# Patient Record
Sex: FEMALE | Race: WHITE | NOT HISPANIC OR LATINO | Employment: FULL TIME | URBAN - METROPOLITAN AREA
[De-identification: names, ages, dates, MRNs, and addresses within clinical notes are randomized per-mention and may not be internally consistent; named-entity substitution may affect disease eponyms.]

---

## 2017-01-15 ENCOUNTER — OFFICE VISIT (OUTPATIENT)
Dept: URGENT CARE | Facility: CLINIC | Age: 57
End: 2017-01-15
Payer: COMMERCIAL

## 2017-01-15 PROCEDURE — 96372 THER/PROPH/DIAG INJ SC/IM: CPT

## 2017-01-15 PROCEDURE — 99213 OFFICE O/P EST LOW 20 MIN: CPT

## 2017-02-08 ENCOUNTER — HOSPITAL ENCOUNTER (OUTPATIENT)
Facility: HOSPITAL | Age: 57
Setting detail: OUTPATIENT SURGERY
Discharge: HOME | End: 2017-02-08
Attending: OBSTETRICS & GYNECOLOGY | Admitting: OBSTETRICS & GYNECOLOGY
Payer: COMMERCIAL

## 2017-02-08 ENCOUNTER — ANESTHESIA EVENT (OUTPATIENT)
Dept: PERIOP | Facility: HOSPITAL | Age: 57
End: 2017-02-08
Payer: COMMERCIAL

## 2017-02-08 ENCOUNTER — ANESTHESIA (OUTPATIENT)
Dept: PERIOP | Facility: HOSPITAL | Age: 57
End: 2017-02-08
Payer: COMMERCIAL

## 2017-02-08 VITALS
SYSTOLIC BLOOD PRESSURE: 117 MMHG | HEIGHT: 62 IN | DIASTOLIC BLOOD PRESSURE: 67 MMHG | TEMPERATURE: 97.5 F | OXYGEN SATURATION: 94 % | HEART RATE: 73 BPM | RESPIRATION RATE: 16 BRPM | BODY MASS INDEX: 30.91 KG/M2 | WEIGHT: 168 LBS

## 2017-02-08 DIAGNOSIS — T19.2XXA: ICD-10-CM

## 2017-02-08 LAB
BASOPHILS # BLD AUTO: 0.02 THOUSANDS/ΜL (ref 0–0.1)
BASOPHILS NFR BLD AUTO: 0 % (ref 0–1)
EOSINOPHIL # BLD AUTO: 0.41 THOUSAND/ΜL (ref 0–0.61)
EOSINOPHIL NFR BLD AUTO: 5 % (ref 0–6)
ERYTHROCYTE [DISTWIDTH] IN BLOOD BY AUTOMATED COUNT: 14.4 % (ref 11.6–15.1)
HCT VFR BLD AUTO: 40.6 % (ref 34.8–46.1)
HGB BLD-MCNC: 13.8 G/DL (ref 11.5–15.4)
LYMPHOCYTES # BLD AUTO: 2.73 THOUSANDS/ΜL (ref 0.6–4.47)
LYMPHOCYTES NFR BLD AUTO: 34 % (ref 14–44)
MCH RBC QN AUTO: 32.1 PG (ref 26.8–34.3)
MCHC RBC AUTO-ENTMCNC: 34 G/DL (ref 31.4–37.4)
MCV RBC AUTO: 94 FL (ref 82–98)
MONOCYTES # BLD AUTO: 0.59 THOUSAND/ΜL (ref 0.17–1.22)
MONOCYTES NFR BLD AUTO: 7 % (ref 4–12)
NEUTROPHILS # BLD AUTO: 4.22 THOUSANDS/ΜL (ref 1.85–7.62)
NEUTS SEG NFR BLD AUTO: 54 % (ref 43–75)
NRBC BLD AUTO-RTO: 0 /100 WBCS
PLATELET # BLD AUTO: 247 THOUSANDS/UL (ref 149–390)
PMV BLD AUTO: 10.7 FL (ref 8.9–12.7)
RBC # BLD AUTO: 4.3 MILLION/UL (ref 3.81–5.12)
WBC # BLD AUTO: 7.97 THOUSAND/UL (ref 4.31–10.16)

## 2017-02-08 PROCEDURE — 88305 TISSUE EXAM BY PATHOLOGIST: CPT | Performed by: OBSTETRICS & GYNECOLOGY

## 2017-02-08 PROCEDURE — 85025 COMPLETE CBC W/AUTO DIFF WBC: CPT | Performed by: OBSTETRICS & GYNECOLOGY

## 2017-02-08 RX ORDER — PHENAZOPYRIDINE HYDROCHLORIDE 200 MG/1
200 TABLET, FILM COATED ORAL
Status: DISCONTINUED | OUTPATIENT
Start: 2017-02-08 | End: 2017-02-08 | Stop reason: HOSPADM

## 2017-02-08 RX ORDER — FENTANYL CITRATE 50 UG/ML
INJECTION, SOLUTION INTRAMUSCULAR; INTRAVENOUS AS NEEDED
Status: DISCONTINUED | OUTPATIENT
Start: 2017-02-08 | End: 2017-02-08 | Stop reason: SURG

## 2017-02-08 RX ORDER — HYDROMORPHONE HYDROCHLORIDE 2 MG/ML
INJECTION, SOLUTION INTRAMUSCULAR; INTRAVENOUS; SUBCUTANEOUS AS NEEDED
Status: DISCONTINUED | OUTPATIENT
Start: 2017-02-08 | End: 2017-02-08 | Stop reason: SURG

## 2017-02-08 RX ORDER — QUETIAPINE FUMARATE 300 MG/1
300 TABLET, FILM COATED ORAL
COMMUNITY
End: 2018-04-30 | Stop reason: ALTCHOICE

## 2017-02-08 RX ORDER — DIVALPROEX SODIUM 500 MG/1
1000 TABLET, EXTENDED RELEASE ORAL
COMMUNITY
End: 2018-04-30 | Stop reason: ALTCHOICE

## 2017-02-08 RX ORDER — ONDANSETRON 2 MG/ML
4 INJECTION INTRAMUSCULAR; INTRAVENOUS ONCE
Status: DISCONTINUED | OUTPATIENT
Start: 2017-02-08 | End: 2017-02-08 | Stop reason: HOSPADM

## 2017-02-08 RX ORDER — MIDAZOLAM HYDROCHLORIDE 1 MG/ML
INJECTION INTRAMUSCULAR; INTRAVENOUS AS NEEDED
Status: DISCONTINUED | OUTPATIENT
Start: 2017-02-08 | End: 2017-02-08 | Stop reason: SURG

## 2017-02-08 RX ORDER — IBUPROFEN 600 MG/1
600 TABLET ORAL EVERY 6 HOURS PRN
Status: DISCONTINUED | OUTPATIENT
Start: 2017-02-08 | End: 2017-02-08 | Stop reason: HOSPADM

## 2017-02-08 RX ORDER — SODIUM CHLORIDE 9 MG/ML
125 INJECTION, SOLUTION INTRAVENOUS CONTINUOUS
Status: DISCONTINUED | OUTPATIENT
Start: 2017-02-08 | End: 2017-02-08 | Stop reason: HOSPADM

## 2017-02-08 RX ORDER — ONDANSETRON 2 MG/ML
INJECTION INTRAMUSCULAR; INTRAVENOUS AS NEEDED
Status: DISCONTINUED | OUTPATIENT
Start: 2017-02-08 | End: 2017-02-08 | Stop reason: SURG

## 2017-02-08 RX ORDER — PANTOPRAZOLE SODIUM 40 MG/1
40 TABLET, DELAYED RELEASE ORAL 2 TIMES DAILY
COMMUNITY

## 2017-02-08 RX ORDER — LORAZEPAM 0.5 MG/1
0.5 TABLET ORAL
COMMUNITY

## 2017-02-08 RX ORDER — PROPOFOL 10 MG/ML
INJECTION, EMULSION INTRAVENOUS AS NEEDED
Status: DISCONTINUED | OUTPATIENT
Start: 2017-02-08 | End: 2017-02-08 | Stop reason: SURG

## 2017-02-08 RX ORDER — BUPIVACAINE HYDROCHLORIDE AND EPINEPHRINE 2.5; 5 MG/ML; UG/ML
INJECTION, SOLUTION INFILTRATION; PERINEURAL AS NEEDED
Status: DISCONTINUED | OUTPATIENT
Start: 2017-02-08 | End: 2017-02-08 | Stop reason: HOSPADM

## 2017-02-08 RX ORDER — FENTANYL CITRATE/PF 50 MCG/ML
25 SYRINGE (ML) INJECTION
Status: DISCONTINUED | OUTPATIENT
Start: 2017-02-08 | End: 2017-02-08 | Stop reason: HOSPADM

## 2017-02-08 RX ADMIN — CEFAZOLIN SODIUM 1000 MG: 1 SOLUTION INTRAVENOUS at 08:29

## 2017-02-08 RX ADMIN — ONDANSETRON HYDROCHLORIDE 4 MG: 2 INJECTION, SOLUTION INTRAVENOUS at 09:45

## 2017-02-08 RX ADMIN — HYDROMORPHONE HYDROCHLORIDE 0.5 MG: 2 INJECTION, SOLUTION INTRAMUSCULAR; INTRAVENOUS; SUBCUTANEOUS at 09:29

## 2017-02-08 RX ADMIN — FENTANYL CITRATE 25 MCG: 50 INJECTION, SOLUTION INTRAMUSCULAR; INTRAVENOUS at 08:38

## 2017-02-08 RX ADMIN — SODIUM CHLORIDE 125 ML/HR: 0.9 INJECTION, SOLUTION INTRAVENOUS at 08:24

## 2017-02-08 RX ADMIN — DEXAMETHASONE SODIUM PHOSPHATE 4 MG: 10 INJECTION INTRAMUSCULAR; INTRAVENOUS at 08:47

## 2017-02-08 RX ADMIN — FENTANYL CITRATE 25 MCG: 50 INJECTION INTRAMUSCULAR; INTRAVENOUS at 10:25

## 2017-02-08 RX ADMIN — PROPOFOL 200 MG: 10 INJECTION, EMULSION INTRAVENOUS at 08:33

## 2017-02-08 RX ADMIN — PHENAZOPYRIDINE HYDROCHLORIDE 200 MG: 200 TABLET ORAL at 07:53

## 2017-02-08 RX ADMIN — FENTANYL CITRATE 25 MCG: 50 INJECTION, SOLUTION INTRAMUSCULAR; INTRAVENOUS at 08:47

## 2017-02-08 RX ADMIN — FENTANYL CITRATE 25 MCG: 50 INJECTION INTRAMUSCULAR; INTRAVENOUS at 10:30

## 2017-02-08 RX ADMIN — SODIUM CHLORIDE 125 ML/HR: 0.9 INJECTION, SOLUTION INTRAVENOUS at 10:36

## 2017-02-08 RX ADMIN — FENTANYL CITRATE 25 MCG: 50 INJECTION, SOLUTION INTRAMUSCULAR; INTRAVENOUS at 09:00

## 2017-02-08 RX ADMIN — FENTANYL CITRATE 25 MCG: 50 INJECTION, SOLUTION INTRAMUSCULAR; INTRAVENOUS at 09:26

## 2017-02-08 RX ADMIN — MIDAZOLAM HYDROCHLORIDE 2 MG: 1 INJECTION, SOLUTION INTRAMUSCULAR; INTRAVENOUS at 08:29

## 2017-05-25 ENCOUNTER — OFFICE VISIT (OUTPATIENT)
Dept: URGENT CARE | Facility: CLINIC | Age: 57
End: 2017-05-25
Payer: COMMERCIAL

## 2017-05-25 PROCEDURE — 99214 OFFICE O/P EST MOD 30 MIN: CPT

## 2017-06-12 ENCOUNTER — TRANSCRIBE ORDERS (OUTPATIENT)
Dept: ADMINISTRATIVE | Facility: HOSPITAL | Age: 57
End: 2017-06-12

## 2017-06-12 DIAGNOSIS — T83.69XA: Primary | ICD-10-CM

## 2017-06-19 ENCOUNTER — HOSPITAL ENCOUNTER (OUTPATIENT)
Dept: CT IMAGING | Facility: HOSPITAL | Age: 57
Discharge: HOME | End: 2017-06-19
Attending: OBSTETRICS & GYNECOLOGY
Payer: COMMERCIAL

## 2017-06-19 DIAGNOSIS — T83.69XA: ICD-10-CM

## 2017-06-19 PROCEDURE — 72192 CT PELVIS W/O DYE: CPT

## 2017-09-18 ENCOUNTER — ANESTHESIA EVENT (OUTPATIENT)
Dept: PERIOP | Facility: HOSPITAL | Age: 57
End: 2017-09-18
Payer: COMMERCIAL

## 2017-09-25 NOTE — PRE-PROCEDURE INSTRUCTIONS
Pre-Surgery Instructions:   Medication Instructions    divalproex sodium (DEPAKOTE ER) 500 mg 24 hr tablet Instructed patient per Anesthesia Guidelines   LORazepam (ATIVAN) 0 5 mg tablet Instructed patient per Anesthesia Guidelines   NON FORMULARY Instructed patient per Anesthesia Guidelines   pantoprazole (PROTONIX) 40 mg tablet Instructed patient per Anesthesia Guidelines   QUEtiapine (SEROquel) 300 mg tablet Instructed patient per Anesthesia Guidelines  Spoke to patient via telephone  As per anesthesia guidelines patient to take protonix am of surgery with sip of water  Patient instructed to hold all Aspirin, supplements, vitamins, and NSAIDs from now to surgery  [facility] pre-op instructions reviewed with patient  Pre-op bathing reviewed, patient will be picking up chlorhexidine soap

## 2017-09-27 ENCOUNTER — HOSPITAL ENCOUNTER (OUTPATIENT)
Facility: HOSPITAL | Age: 57
Setting detail: OUTPATIENT SURGERY
Discharge: HOME | End: 2017-09-28
Attending: OBSTETRICS & GYNECOLOGY | Admitting: OBSTETRICS & GYNECOLOGY
Payer: COMMERCIAL

## 2017-09-27 ENCOUNTER — ANESTHESIA (OUTPATIENT)
Dept: PERIOP | Facility: HOSPITAL | Age: 57
End: 2017-09-27
Payer: COMMERCIAL

## 2017-09-27 DIAGNOSIS — T83.69XA: ICD-10-CM

## 2017-09-27 DIAGNOSIS — T83.721A: ICD-10-CM

## 2017-09-27 PROCEDURE — 88304 TISSUE EXAM BY PATHOLOGIST: CPT | Performed by: OBSTETRICS & GYNECOLOGY

## 2017-09-27 RX ORDER — SODIUM CHLORIDE 9 MG/ML
125 INJECTION, SOLUTION INTRAVENOUS CONTINUOUS
Status: DISCONTINUED | OUTPATIENT
Start: 2017-09-27 | End: 2017-09-27 | Stop reason: HOSPADM

## 2017-09-27 RX ORDER — HYDROMORPHONE HYDROCHLORIDE 2 MG/ML
INJECTION, SOLUTION INTRAMUSCULAR; INTRAVENOUS; SUBCUTANEOUS AS NEEDED
Status: DISCONTINUED | OUTPATIENT
Start: 2017-09-27 | End: 2017-09-27 | Stop reason: SURG

## 2017-09-27 RX ORDER — MEPERIDINE HYDROCHLORIDE 50 MG/ML
12.5 INJECTION INTRAMUSCULAR; INTRAVENOUS; SUBCUTANEOUS ONCE AS NEEDED
Status: DISCONTINUED | OUTPATIENT
Start: 2017-09-27 | End: 2017-09-27 | Stop reason: HOSPADM

## 2017-09-27 RX ORDER — IBUPROFEN 600 MG/1
600 TABLET ORAL EVERY 6 HOURS
Status: DISCONTINUED | OUTPATIENT
Start: 2017-09-27 | End: 2017-09-28 | Stop reason: HOSPADM

## 2017-09-27 RX ORDER — PANTOPRAZOLE SODIUM 40 MG/1
40 TABLET, DELAYED RELEASE ORAL 2 TIMES DAILY
Status: DISCONTINUED | OUTPATIENT
Start: 2017-09-27 | End: 2017-09-28 | Stop reason: HOSPADM

## 2017-09-27 RX ORDER — DOCUSATE SODIUM 100 MG/1
100 CAPSULE, LIQUID FILLED ORAL 2 TIMES DAILY
Status: DISCONTINUED | OUTPATIENT
Start: 2017-09-27 | End: 2017-09-28 | Stop reason: HOSPADM

## 2017-09-27 RX ORDER — FENTANYL CITRATE/PF 50 MCG/ML
50 SYRINGE (ML) INJECTION
Status: DISCONTINUED | OUTPATIENT
Start: 2017-09-27 | End: 2017-09-27 | Stop reason: HOSPADM

## 2017-09-27 RX ORDER — LABETALOL HYDROCHLORIDE 5 MG/ML
INJECTION, SOLUTION INTRAVENOUS AS NEEDED
Status: DISCONTINUED | OUTPATIENT
Start: 2017-09-27 | End: 2017-09-27 | Stop reason: SURG

## 2017-09-27 RX ORDER — SODIUM CHLORIDE, SODIUM LACTATE, POTASSIUM CHLORIDE, CALCIUM CHLORIDE 600; 310; 30; 20 MG/100ML; MG/100ML; MG/100ML; MG/100ML
1000 INJECTION, SOLUTION INTRAVENOUS ONCE
Status: DISCONTINUED | OUTPATIENT
Start: 2017-09-27 | End: 2017-09-27

## 2017-09-27 RX ORDER — MIDAZOLAM HYDROCHLORIDE 1 MG/ML
INJECTION INTRAMUSCULAR; INTRAVENOUS AS NEEDED
Status: DISCONTINUED | OUTPATIENT
Start: 2017-09-27 | End: 2017-09-27 | Stop reason: SURG

## 2017-09-27 RX ORDER — ONDANSETRON 2 MG/ML
4 INJECTION INTRAMUSCULAR; INTRAVENOUS EVERY 6 HOURS PRN
Status: DISCONTINUED | OUTPATIENT
Start: 2017-09-27 | End: 2017-09-28 | Stop reason: HOSPADM

## 2017-09-27 RX ORDER — FENTANYL CITRATE 50 UG/ML
INJECTION, SOLUTION INTRAMUSCULAR; INTRAVENOUS AS NEEDED
Status: DISCONTINUED | OUTPATIENT
Start: 2017-09-27 | End: 2017-09-27 | Stop reason: SURG

## 2017-09-27 RX ORDER — SODIUM CHLORIDE 9 MG/ML
75 INJECTION, SOLUTION INTRAVENOUS CONTINUOUS
Status: DISCONTINUED | OUTPATIENT
Start: 2017-09-27 | End: 2017-09-28 | Stop reason: HOSPADM

## 2017-09-27 RX ORDER — ACETAMINOPHEN 325 MG/1
650 TABLET ORAL EVERY 6 HOURS
Status: DISCONTINUED | OUTPATIENT
Start: 2017-09-27 | End: 2017-09-28 | Stop reason: HOSPADM

## 2017-09-27 RX ORDER — GLYCOPYRROLATE 0.2 MG/ML
INJECTION INTRAMUSCULAR; INTRAVENOUS AS NEEDED
Status: DISCONTINUED | OUTPATIENT
Start: 2017-09-27 | End: 2017-09-27 | Stop reason: SURG

## 2017-09-27 RX ORDER — DIPHENHYDRAMINE HYDROCHLORIDE 50 MG/ML
12.5 INJECTION INTRAMUSCULAR; INTRAVENOUS ONCE
Status: COMPLETED | OUTPATIENT
Start: 2017-09-27 | End: 2017-09-27

## 2017-09-27 RX ORDER — ROCURONIUM BROMIDE 10 MG/ML
INJECTION, SOLUTION INTRAVENOUS AS NEEDED
Status: DISCONTINUED | OUTPATIENT
Start: 2017-09-27 | End: 2017-09-27 | Stop reason: SURG

## 2017-09-27 RX ORDER — DIVALPROEX SODIUM 500 MG/1
1000 TABLET, EXTENDED RELEASE ORAL
Status: DISCONTINUED | OUTPATIENT
Start: 2017-09-27 | End: 2017-09-28 | Stop reason: HOSPADM

## 2017-09-27 RX ORDER — PHENAZOPYRIDINE HYDROCHLORIDE 200 MG/1
200 TABLET, FILM COATED ORAL ONCE
Status: COMPLETED | OUTPATIENT
Start: 2017-09-27 | End: 2017-09-27

## 2017-09-27 RX ORDER — PROPOFOL 10 MG/ML
INJECTION, EMULSION INTRAVENOUS AS NEEDED
Status: DISCONTINUED | OUTPATIENT
Start: 2017-09-27 | End: 2017-09-27 | Stop reason: SURG

## 2017-09-27 RX ORDER — LORAZEPAM 0.5 MG/1
0.5 TABLET ORAL
Status: DISCONTINUED | OUTPATIENT
Start: 2017-09-27 | End: 2017-09-28 | Stop reason: HOSPADM

## 2017-09-27 RX ORDER — ONDANSETRON 2 MG/ML
INJECTION INTRAMUSCULAR; INTRAVENOUS AS NEEDED
Status: DISCONTINUED | OUTPATIENT
Start: 2017-09-27 | End: 2017-09-27 | Stop reason: SURG

## 2017-09-27 RX ORDER — QUETIAPINE FUMARATE 300 MG/1
300 TABLET, FILM COATED ORAL
Status: DISCONTINUED | OUTPATIENT
Start: 2017-09-27 | End: 2017-09-28 | Stop reason: HOSPADM

## 2017-09-27 RX ORDER — ONDANSETRON 2 MG/ML
4 INJECTION INTRAMUSCULAR; INTRAVENOUS ONCE AS NEEDED
Status: DISCONTINUED | OUTPATIENT
Start: 2017-09-27 | End: 2017-09-27 | Stop reason: HOSPADM

## 2017-09-27 RX ADMIN — MENTHOL 5.4 MG: 5.4 LOZENGE ORAL at 21:39

## 2017-09-27 RX ADMIN — SODIUM CHLORIDE 75 ML/HR: 0.9 INJECTION, SOLUTION INTRAVENOUS at 11:46

## 2017-09-27 RX ADMIN — MIDAZOLAM HYDROCHLORIDE 2 MG: 1 INJECTION, SOLUTION INTRAMUSCULAR; INTRAVENOUS at 07:31

## 2017-09-27 RX ADMIN — QUETIAPINE FUMARATE 300 MG: 300 TABLET, FILM COATED ORAL at 21:01

## 2017-09-27 RX ADMIN — ACETAMINOPHEN 650 MG: 325 TABLET ORAL at 21:00

## 2017-09-27 RX ADMIN — PROPOFOL 100 MG: 10 INJECTION, EMULSION INTRAVENOUS at 07:36

## 2017-09-27 RX ADMIN — PROPOFOL 200 MG: 10 INJECTION, EMULSION INTRAVENOUS at 07:35

## 2017-09-27 RX ADMIN — ROCURONIUM BROMIDE 10 MG: 10 INJECTION, SOLUTION INTRAVENOUS at 09:29

## 2017-09-27 RX ADMIN — NEOSTIGMINE METHYLSULFATE 3 MG: 1 INJECTION, SOLUTION INTRAMUSCULAR; INTRAVENOUS; SUBCUTANEOUS at 10:07

## 2017-09-27 RX ADMIN — GLYCOPYRROLATE 0.4 MG: 0.2 INJECTION, SOLUTION INTRAMUSCULAR; INTRAVENOUS at 10:07

## 2017-09-27 RX ADMIN — FENTANYL CITRATE 100 MCG: 50 INJECTION, SOLUTION INTRAMUSCULAR; INTRAVENOUS at 07:35

## 2017-09-27 RX ADMIN — ROCURONIUM BROMIDE 15 MG: 10 INJECTION, SOLUTION INTRAVENOUS at 08:37

## 2017-09-27 RX ADMIN — CEFAZOLIN SODIUM 2000 MG: 2 SOLUTION INTRAVENOUS at 22:31

## 2017-09-27 RX ADMIN — DIPHENHYDRAMINE HYDROCHLORIDE 12.5 MG: 50 INJECTION, SOLUTION INTRAMUSCULAR; INTRAVENOUS at 11:34

## 2017-09-27 RX ADMIN — HYDROMORPHONE HYDROCHLORIDE 1 MG: 2 INJECTION, SOLUTION INTRAMUSCULAR; INTRAVENOUS; SUBCUTANEOUS at 09:07

## 2017-09-27 RX ADMIN — SODIUM CHLORIDE: 0.9 INJECTION, SOLUTION INTRAVENOUS at 08:47

## 2017-09-27 RX ADMIN — CEFAZOLIN SODIUM 2000 MG: 1 SOLUTION INTRAVENOUS at 07:31

## 2017-09-27 RX ADMIN — CEFAZOLIN SODIUM 2000 MG: 2 SOLUTION INTRAVENOUS at 15:03

## 2017-09-27 RX ADMIN — SODIUM CHLORIDE 125 ML/HR: 0.9 INJECTION, SOLUTION INTRAVENOUS at 07:04

## 2017-09-27 RX ADMIN — LABETALOL HYDROCHLORIDE 5 MG: 5 INJECTION, SOLUTION INTRAVENOUS at 08:33

## 2017-09-27 RX ADMIN — ACETAMINOPHEN 650 MG: 325 TABLET ORAL at 13:57

## 2017-09-27 RX ADMIN — DOCUSATE SODIUM 100 MG: 100 CAPSULE, LIQUID FILLED ORAL at 13:55

## 2017-09-27 RX ADMIN — DEXAMETHASONE SODIUM PHOSPHATE 4 MG: 10 INJECTION INTRAMUSCULAR; INTRAVENOUS at 07:56

## 2017-09-27 RX ADMIN — IBUPROFEN 600 MG: 600 TABLET, FILM COATED ORAL at 17:04

## 2017-09-27 RX ADMIN — DIVALPROEX SODIUM 1000 MG: 500 TABLET, EXTENDED RELEASE ORAL at 21:01

## 2017-09-27 RX ADMIN — ONDANSETRON HYDROCHLORIDE 4 MG: 2 INJECTION, SOLUTION INTRAVENOUS at 09:55

## 2017-09-27 RX ADMIN — LABETALOL HYDROCHLORIDE 5 MG: 5 INJECTION, SOLUTION INTRAVENOUS at 08:15

## 2017-09-27 RX ADMIN — PANTOPRAZOLE SODIUM 40 MG: 40 TABLET, DELAYED RELEASE ORAL at 17:04

## 2017-09-27 RX ADMIN — PROPOFOL 100 MG: 10 INJECTION, EMULSION INTRAVENOUS at 08:37

## 2017-09-27 RX ADMIN — ROCURONIUM BROMIDE 35 MG: 10 INJECTION, SOLUTION INTRAVENOUS at 07:35

## 2017-09-27 RX ADMIN — HYDROMORPHONE HYDROCHLORIDE 1 MG: 2 INJECTION, SOLUTION INTRAMUSCULAR; INTRAVENOUS; SUBCUTANEOUS at 08:41

## 2017-09-27 RX ADMIN — PHENAZOPYRIDINE HYDROCHLORIDE 200 MG: 200 TABLET ORAL at 06:11

## 2017-09-27 NOTE — OP NOTE
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, implant, 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 [T83 69XA]    Procedure(s) (LRB):  RESECTION OF EXPOSED MESH VAGINAL AND PARAVAGINAL TISSUE (N/A)  CYSTOSCOPY (N/A)    Specimen(s):  ID Type Source Tests Collected by Time Destination   1 : paravaginal tissue and mesh Tissue Soft Tissue, Other TISSUE EXAM Neeraj Navarro MD 2017 [id number]        Estimated Blood Loss:   Minimal    Drains:       Anesthesia Type:   General    Operative Indications:  Exposure of implanted vaginal mesh into vagina, initial encounter [T83 721A]  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, implant, 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 [T83 69XA]      Operative Findings:  1  Reactive, edematous urothelium noted from 1 oclock to 6 oclock distally  2  No defects in bladder mucosa pre and post procedure  3  Good efflux noted from ureters bilaterally pre and post procedure  4  Normal urethra  5  Exposed mesh noted left paravesical/vaginal sulcus    Complications:   None    Procedure and Technique:  Appropriate preoperative antibiotics chosen per ACOG guidelines were given  Bilateral SCDs were placed in the lower extremities for DVT prevention prior to the institution of anesthesia  No bladder, ureteral, viscus, or solid organ injury were noted at the end of the procedure      The patient was properly identified in the holding area by the operating room staff and attending physician  She was taken to the operating room where spinal anesthesia and IV sedation were instituted without complications  She was placed in the dorsal lithotomy position with her legs in [address] [place] [address] with care taken to avoid excessive flexion or extension of her lower extremities  She was prepped and draped in the standard sterile fashion  At this time, the patient was receiving prophylactic antibiotics  A time-out was taken, and the patient was again properly identified by the operating room staff and attending physician  We began the procedure by performing cystoscopy  The bladder was filled with 200 mL of normal saline  A complete and thorough inspection of the bladder was performed with the above dictated findings  Efflux was noted  Reactive urothelium was noted  No exposed mush or sutures were noted  As the cystoscope was withdrawn the urethra was visualized normal as well  A Marcos catheter was placed with sterile conditions  Next an examination under anesthesia revealed a small area of exposed mesh 1cm in the left paravesical area/left vaginal sulcus  The vaginal mucosa around this area of exposure was infiltrated with 5 cc of 0 25 bupivicaine  A 3 cm vertical incision was made in the vaginal mucosa over the area of exposure  The mesh was grasped with hemostats, placed on tension and the vaginal mucosa was dissected off of the mesh with sharp and blunt dissection bilaterally  Next with lateral traction placed on the mesh, the most medial portion of of the dissection mesh was cut using the scalpel  The cut edge of this mesh was grasped with a kocher clamp and sharply dissected off of the underlying bladder and paravesical tissue  Next the most lateral portion of the mesh was identified by placing traction medially  This end was grasped with a kocher clamp and incised with the scalpel    The mesh was then completely dissect off the surrounding paravaginal tissue and handed off for pathology  The total segment of mesh removed was 3 5cm by 5cm  A few microfibers of remaining mesh were noted  These were trimmed flush with the underlying tissue and not removed due to the possibility of accidentally causing a cysotomy  Minimal bleeding was noted and hemostasis was attained with a few, short bursts of electrocautery  Minimal vaginal mucosa was trimmed bilaterally to healthy tissue and reapproximated in one layer using 2-0 Vicryl  A two layer closure was not performed because we did not want to increase her risk of cystotomy and fistula formation  The camacho was removed  We repeated cystoscopy  The bladder was filled with 200 mL of normal saline  A complete and thorough inspection of the bladder was performed with the above dictated findings  Efflux was noted  Reactive urothelium was noted  No exposed mush or sutures were noted  As the cystoscope was withdrawn the urethra was visualized normal as well  A Camacho catheter was placed with sterile conditions  The patient tolerated the procedure well  Sponge, needle and instrument count were correct x2  Dr Idalmis Bonds was present for the entire procedure  The patient was awakened and went to the recovery room in stable condition          A qualified resident physician was not available    Patient Disposition:  PACU     SIGNATURE: Kia Oseguera MD  DATE: September 27, 2017  TIME: 10:24 AM

## 2017-09-27 NOTE — PLAN OF CARE
DISCHARGE PLANNING     Discharge to home or other facility with appropriate resources Progressing        GENITOURINARY - ADULT     Maintains or returns to baseline urinary function Progressing     Absence of urinary retention Progressing     Urinary catheter remains patent Progressing        INFECTION - ADULT     Absence or prevention of progression during hospitalization Progressing     Absence of fever/infection during neutropenic period Progressing        Knowledge Deficit     Patient/family/caregiver demonstrates understanding of disease process, treatment plan, medications, and discharge instructions Progressing        PAIN - ADULT     Verbalizes/displays adequate comfort level or baseline comfort level Progressing        SAFETY ADULT     Patient will remain free of falls Progressing     Maintain or return to baseline ADL function Progressing     Maintain or return mobility status to optimal level Progressing

## 2017-09-28 VITALS
SYSTOLIC BLOOD PRESSURE: 120 MMHG | TEMPERATURE: 96.8 F | WEIGHT: 190 LBS | HEART RATE: 72 BPM | DIASTOLIC BLOOD PRESSURE: 58 MMHG | RESPIRATION RATE: 20 BRPM | OXYGEN SATURATION: 94 % | HEIGHT: 62 IN | BODY MASS INDEX: 34.96 KG/M2

## 2017-09-28 RX ADMIN — ACETAMINOPHEN 650 MG: 325 TABLET ORAL at 09:44

## 2017-09-28 RX ADMIN — IBUPROFEN 600 MG: 600 TABLET, FILM COATED ORAL at 05:46

## 2017-09-28 RX ADMIN — MENTHOL 5.4 MG: 5.4 LOZENGE ORAL at 05:47

## 2017-09-28 RX ADMIN — PANTOPRAZOLE SODIUM 40 MG: 40 TABLET, DELAYED RELEASE ORAL at 09:44

## 2017-09-28 RX ADMIN — DOCUSATE SODIUM 100 MG: 100 CAPSULE, LIQUID FILLED ORAL at 09:44

## 2017-12-14 ENCOUNTER — OFFICE VISIT (OUTPATIENT)
Dept: URGENT CARE | Facility: CLINIC | Age: 57
End: 2017-12-14
Payer: COMMERCIAL

## 2017-12-14 PROCEDURE — 99213 OFFICE O/P EST LOW 20 MIN: CPT

## 2017-12-16 NOTE — PROGRESS NOTES
Assessment  1  Acute frontal sinusitis (461 1) (J01 10)    Plan  Acute frontal sinusitis    · Amoxicillin-Pot Clavulanate 875-125 MG Oral Tablet; TAKE 1 TABLET EVERY 12HOURS DAILY   · Benzonatate 100 MG Oral Capsule; TAKE 1 CAPSULE 3 TIMES DAILY AS NEEDED    Discussion/Summary  Discussion Summary:   Discussed dx of sinusitis and will treat with augmentin and follow up with PCP in 3-5 days  Medication Side Effects Reviewed: Possible side effects of new medications were reviewed with the patient/guardian today  Understands and agrees with treatment plan: The treatment plan was reviewed with the patient/guardian  The patient/guardian understands and agrees with the treatment plan   Counseling Documentation With Imm: The patient was counseled regarding instructions for management,-- patient and family education,-- importance of compliance with treatment  total time of encounter was 25 minutes-- and-- 10 minutes was spent counseling  Follow Up Instructions: Follow Up with your Primary Care Provider in 3-5 days  If your symptoms worsen, go to the nearest [facility] Emergency Department  Chief Complaint  1  Cold Symptoms  Chief Complaint Free Text Note Form: C/O sinus pressure, post nasal drip, cough, sore throat x 3 weeks  Pt was treated prior to this with Zithromax and Prednisone  The symptoms returned soon after she finished the medication  History of Present Illness  HPI: 56year old female at urgent care with chief complaint of sinus pressure PND cough for 2 weeks has not used nay OTC medications   Hospital Based Practices Required Assessment:  Pain Assessment  the patient states they have pain  The pain is located in the throat  The patient describes the pain as aching  (on a scale of 0 to 10, the patient rates the pain at 3 )  Abuse And Domestic Violence Screen   Yes, the patient is safe at home  -- The patient states no one is hurting them  Depression And Suicide Screen   No, the patient has not had thoughts of hurting themself  No, the patient has not felt depressed in the past 7 days  Readiness To Learn: Receptive  Barriers To Learning: none  Preferred Learning: verbal  Education Completed: disease/condition,-- medications-- and-- further treatment/follow-up  Teaching Method: verbal  Person Taught: patient  Evaluation Of Learning: verbalized/demonstrated understanding   Cold Symptoms: Summer Benton presents with complaints of cold symptoms  Review of Systems  Focused-Female:  Constitutional: No fever, no chills, feels well, no tiredness, no recent weight gain or loss  ENT: nasal discharge  Cardiovascular: no complaints of slow or fast heart rate, no chest pain, no palpitations, no leg claudication or lower extremity edema  Respiratory: no complaints of shortness of breath, no wheezing, no dyspnea on exertion, no orthopnea or PND  Breasts: no complaints of breast pain, breast lump or nipple discharge  Gastrointestinal: no complaints of abdominal pain, no constipation, no nausea or diarrhea, no vomiting, no bloody stools  Genitourinary: no complaints of dysuria, no incontinence, no pelvic pain, no dysmenorrhea, no vaginal discharge or abnormal vaginal bleeding  Musculoskeletal: no complaints of arthralgia, no myalgia, no joint swelling or stiffness, no limb pain or swelling  Integumentary: no complaints of skin rash or lesion, no itching or dry skin, no skin wounds  Neurological: no complaints of headache, no confusion, no numbness or tingling, no dizziness or fainting  ROS Reviewed:   ROS reviewed  Active Problems  1  Bipolar disorder (296 80) (F31 9)   2  Closed head injury, initial encounter (959 01) (S09 90XA)    Past Medical History  1  History of Accidental fall, initial encounter (E888 9) (W19 XXXA)   2  History of Acute pain (338 19) (R52)   3  History of Allergic reaction, initial encounter (995 3) (T78 40XA)   4  History of Ear ache (388 70) (H92 09)   5   History of Elbow injury (959 3) (S57 909A)   6  History of Foot pain, right (729 5) (M79 671)   7  History of hypertension (V12 59) (Z86 79)   8  History of influenza (V12 09) (Z87 09)   9  History of neck pain (V13 59) (Z87 39)   10  History of Injury of left hand, initial encounter (959 4) (S69 92XA)   11  History of Laceration Of Hand (882 0)   12  No pertinent past medical history   13  History of Nondisp fx of proximal phalanx of right 5th finger with routine heal (V54 19)  (N94 900T)  Active Problems And Past Medical History Reviewed: The active problems and past medical history were reviewed and updated today  Family History  Family History Reviewed: The family history was reviewed and updated today  Social History   · Being A Social Drinker   · Current Every Day Smoker (305 1)  Social History Reviewed: The social history was reviewed and updated today  The social history was reviewed and is unchanged  Surgical History  1  History of Bladder Surgery   2  History of Exploration Of Spinal Fusion   3  History of Hysterectomy   4  History of Tubal Ligation  Surgical History Reviewed: The surgical history was reviewed and updated today  Current Meds   1  Ativan TABS; Therapy: (Recorded:74Csx0926) to Recorded   2  Depakote TBEC; Therapy: (Recorded:36Qiw1052) to Recorded   3  Omeprazole 20 MG Oral Tablet Delayed Release; Therapy: (Recorded:80Umc5621) to Recorded   4  SEROquel TABS; Therapy: (Recorded:76Otp8463) to Recorded  Medication List Reviewed: The medication list was reviewed and updated today  Allergies  1  No Known Drug Allergies    Vitals  Signs   Recorded: 53Tmu5762 03:46PM   Temperature: 97 6 F  Heart Rate: 94  Respiration: 20  Systolic: 874  Diastolic: 86  Height: 5 ft 1 in  Weight: 170 lb   BMI Calculated: 32 12  BSA Calculated: 1 76  O2 Saturation: 99  Pain Scale: 3    Physical Exam   Constitutional  General appearance: No acute distress, well appearing and well nourished  Eyes  Conjunctiva and lids: No swelling, erythema or discharge  Pupils and irises: Equal, round and reactive to light  Ears, Nose, Mouth, and Throat  External inspection of ears and nose: Normal    Otoscopic examination: Tympanic membranes translucent with normal light reflex  Canals patent without erythema  Nasal mucosa, septum, and turbinates: Abnormal   normal nasal septum,-- no intranasal masses or polyps-- and-- normal nasal turbinates  There was a purulent discharge from both nares  The bilateral nasal mucosa was boggy-- and-- edematous  Oropharynx: Abnormal  -- PND  Pulmonary  Respiratory effort: No increased work of breathing or signs of respiratory distress  Auscultation of lungs: Clear to auscultation  Cardiovascular  Palpation of heart: Normal PMI, no thrills  Auscultation of heart: Normal rate and rhythm, normal S1 and S2, without murmurs  Lymphatic  Palpation of lymph nodes in neck: No lymphadenopathy     Musculoskeletal  Gait and station: Normal    Psychiatric  Orientation to person, place, and time: Normal    Mood and affect: Normal        Signatures   Electronically signed by : Evelyn Arroyo NP; Dec 14 2017  3:55PM EST                       (Author)    Electronically signed by : ZAFAR Elizalde ; Dec 15 2017  4:04PM EST                       (Co-author)

## 2018-01-23 VITALS
BODY MASS INDEX: 32.1 KG/M2 | OXYGEN SATURATION: 99 % | TEMPERATURE: 97.6 F | RESPIRATION RATE: 20 BRPM | WEIGHT: 170 LBS | HEART RATE: 94 BPM | HEIGHT: 61 IN | DIASTOLIC BLOOD PRESSURE: 86 MMHG | SYSTOLIC BLOOD PRESSURE: 130 MMHG

## 2018-04-21 LAB
ALBUMIN SERPL BCP-MCNC: 4.4 G/DL (ref 3.5–5.7)
ALP SERPL-CCNC: 62 IU/L (ref 40–150)
ALT SERPL W P-5'-P-CCNC: 49 IU/L (ref 0–50)
ANION GAP SERPL CALCULATED.3IONS-SCNC: 11.5 MM/L
AST SERPL W P-5'-P-CCNC: 41 U/L (ref 7–26)
BASOPHILS # BLD AUTO: 0.1 X3/UL (ref 0–0.3)
BASOPHILS # BLD AUTO: 0.7 % (ref 0–2)
BILIRUB SERPL-MCNC: 0.5 MG/DL (ref 0.3–1)
BUN SERPL-MCNC: 10 MG/DL (ref 7–25)
CALCIUM SERPL-MCNC: 9.6 MG/DL (ref 8.6–10.5)
CHLORIDE SERPL-SCNC: 101 MM/L (ref 98–107)
CHOLEST SERPL-MCNC: 188 MG/DL (ref 0–200)
CO2 SERPL-SCNC: 34 MM/L (ref 21–31)
CREAT SERPL-MCNC: 0.96 MG/DL (ref 0.6–1.2)
DEPRECATED RDW RBC AUTO: 15.2 % (ref 11.5–14.5)
EGFR (HISTORICAL): 60 GFR
EGFR AFRICAN AMERICAN (HISTORICAL): > 60 GFR
EOSINOPHIL # BLD AUTO: 0.3 X3/UL (ref 0–0.5)
EOSINOPHIL NFR BLD AUTO: 3.7 % (ref 0–5)
GLUCOSE (HISTORICAL): 91 MG/DL (ref 65–99)
HCT VFR BLD AUTO: 45 % (ref 37–47)
HDLC SERPL-MCNC: 54 MG/DL (ref 40–60)
HGB BLD-MCNC: 14.9 G/DL (ref 12–16)
LDLC SERPL CALC-MCNC: 102.2 MG/DL (ref 75–193)
LYMPHOCYTES # BLD AUTO: 3.6 X3/UL (ref 1.2–4.2)
LYMPHOCYTES NFR BLD AUTO: 45.7 % (ref 20.5–51.1)
MCH RBC QN AUTO: 30.9 PG (ref 26–34)
MCHC RBC AUTO-ENTMCNC: 33.1 G/DL (ref 31–36)
MCV RBC AUTO: 93.6 FL (ref 81–99)
MONOCYTES # BLD AUTO: 0.5 X3/UL (ref 0–1)
MONOCYTES NFR BLD AUTO: 6.6 % (ref 1.7–12)
NEUTROPHILS # BLD AUTO: 3.4 X3/UL (ref 1.4–6.5)
NEUTS SEG NFR BLD AUTO: 43.3 % (ref 42.2–75.2)
OSMOLALITY, SERUM (HISTORICAL): 282 MOSM (ref 262–291)
PLATELET # BLD AUTO: 237 X3/UL (ref 130–400)
PMV BLD AUTO: 9.2 FL (ref 8.6–11.7)
POTASSIUM SERPL-SCNC: 4.5 MM/L (ref 3.5–5.5)
RBC # BLD AUTO: 4.81 X6/UL (ref 3.9–5.2)
SODIUM SERPL-SCNC: 142 MM/L (ref 134–143)
TOTAL PROTEIN (HISTORICAL): 7.2 G/DL (ref 6.4–8.9)
TRIGL SERPL-MCNC: 157 MG/DL (ref 44–166)
VLDL CHOLESTEROL (HISTORICAL): 31 MG/DL (ref 5–51)
WBC # BLD AUTO: 7.9 X3/UL (ref 4.8–10.8)

## 2018-04-22 LAB — HEPATITIS C ANTIBODY (HISTORICAL): <0.1 S/CO (ref 0–0.9)

## 2018-04-23 LAB
EST. AVERAGE GLUCOSE BLD GHB EST-MCNC: 122 MG/DL
HBA1C MFR BLD HPLC: 5.9 % (ref 4–6.2)

## 2018-04-30 ENCOUNTER — OFFICE VISIT (OUTPATIENT)
Dept: URGENT CARE | Facility: CLINIC | Age: 58
End: 2018-04-30
Payer: COMMERCIAL

## 2018-04-30 VITALS
SYSTOLIC BLOOD PRESSURE: 171 MMHG | HEART RATE: 93 BPM | DIASTOLIC BLOOD PRESSURE: 79 MMHG | TEMPERATURE: 99.5 F | OXYGEN SATURATION: 97 %

## 2018-04-30 DIAGNOSIS — J06.9 UPPER RESPIRATORY INFECTION, ACUTE: Primary | ICD-10-CM

## 2018-04-30 PROCEDURE — 99213 OFFICE O/P EST LOW 20 MIN: CPT | Performed by: NURSE PRACTITIONER

## 2018-04-30 RX ORDER — BUTALBITAL, ACETAMINOPHEN AND CAFFEINE 50; 325; 40 MG/1; MG/1; MG/1
TABLET ORAL
COMMUNITY
Start: 2011-06-07

## 2018-04-30 RX ORDER — AZITHROMYCIN 250 MG/1
TABLET, FILM COATED ORAL
Qty: 6 TABLET | Refills: 0 | Status: SHIPPED | OUTPATIENT
Start: 2018-04-30 | End: 2018-05-05 | Stop reason: ALTCHOICE

## 2018-04-30 RX ORDER — DIVALPROEX SODIUM 500 MG/1
TABLET, DELAYED RELEASE ORAL
COMMUNITY

## 2018-04-30 RX ORDER — LORAZEPAM 2 MG/1
TABLET ORAL
COMMUNITY
End: 2020-09-21 | Stop reason: SDUPTHER

## 2018-04-30 RX ORDER — OMEPRAZOLE 40 MG/1
CAPSULE, DELAYED RELEASE ORAL
COMMUNITY
Start: 2014-12-07 | End: 2020-09-21 | Stop reason: SDUPTHER

## 2018-04-30 RX ORDER — BUDESONIDE AND FORMOTEROL FUMARATE DIHYDRATE 160; 4.5 UG/1; UG/1
AEROSOL RESPIRATORY (INHALATION)
COMMUNITY
Start: 2018-02-14 | End: 2020-09-21 | Stop reason: SDUPTHER

## 2018-04-30 RX ORDER — BENZONATATE 200 MG/1
CAPSULE ORAL
COMMUNITY
Start: 2018-02-08 | End: 2019-01-09 | Stop reason: ALTCHOICE

## 2018-04-30 RX ORDER — ESTRADIOL 0.1 MG/G
CREAM VAGINAL
COMMUNITY
Start: 2014-12-16 | End: 2020-09-21 | Stop reason: SDUPTHER

## 2018-04-30 RX ORDER — QUETIAPINE FUMARATE 300 MG/1
300 TABLET, FILM COATED ORAL
COMMUNITY
Start: 2011-06-07

## 2018-04-30 NOTE — PROGRESS NOTES
[facility] [employer] [facility]        NAME: Gi Hayes is a 66 y o  female  : 1960    MRN: [id number]  DATE: 2018  TIME: 5:08 PM    Assessment and Plan   Upper respiratory infection, acute [J06 9]  1  Upper respiratory infection, acute  azithromycin (ZITHROMAX) 250 mg tablet         Patient Instructions     Patient Instructions   Take Prednisone as already prescribed by PCP  Add Zithromax  Monitor Blood Pressure- consult PCP if remains elevated  Plain Mucinex as directed  Call or return for problems/concerns  Work note given    Follow up with PCP in 3-5 days  Proceed to  ER if symptoms worsen  Chief Complaint     Chief Complaint   Patient presents with    Cough     x 2 days         History of Present Illness       Cough and sinus congestion x 2 days  Is using Symbicort and Nyquil with minimal relief   was diagnosed with Bronchitis on Friday  Has a Prednisone RX waiting for her at the pharmacy from her PCP for a rash        Review of Systems   Review of Systems   Constitutional: Positive for fatigue  Negative for activity change, diaphoresis and fever  HENT: Positive for congestion and rhinorrhea  Negative for facial swelling, hearing loss, sinus pain, sinus pressure, sneezing, sore throat and voice change  Eyes: Negative for discharge and visual disturbance  Respiratory: Positive for cough, shortness of breath and wheezing  Negative for choking, chest tightness and stridor  Cardiovascular: Negative for chest pain, palpitations and leg swelling  Gastrointestinal: Negative for abdominal distention, abdominal pain, constipation, diarrhea, nausea and vomiting  Endocrine: Negative for polydipsia, polyphagia and polyuria  Genitourinary: Negative for difficulty urinating, dysuria, frequency and urgency  Musculoskeletal: Negative for arthralgias, back pain, gait problem, joint swelling, myalgias, neck pain and neck stiffness  Skin: Negative for color change, rash and wound  Neurological: Negative for dizziness, syncope, speech difficulty, weakness, light-headedness and headaches  Hematological: Negative for adenopathy  Does not bruise/bleed easily  Psychiatric/Behavioral: Negative for agitation, behavioral problems, confusion, hallucinations, sleep disturbance and suicidal ideas  The patient is not nervous/anxious            Current Medications       Current Outpatient Prescriptions:     butalbital-acetaminophen-caffeine (FIORICET,ESGIC) -40 mg per tablet, as needed, Disp: , Rfl:     estradiol (ESTRACE VAGINAL) 0 1 mg/g vaginal cream, , Disp: , Rfl:     omeprazole (PriLOSEC) 40 MG capsule, , Disp: , Rfl:     QUEtiapine (SEROquel) 300 mg tablet, Take 300 mg by mouth, Disp: , Rfl:     azithromycin (ZITHROMAX) 250 mg tablet, 2 tablets day 1, then 1 tablet days 2-5, Disp: 6 tablet, Rfl: 0    benzonatate (TESSALON) 200 MG capsule, , Disp: , Rfl:     divalproex sodium (DEPAKOTE) 500 mg EC tablet, Take by mouth, Disp: , Rfl:     LORazepam (ATIVAN) 0 5 mg tablet, Take 0 5 mg by mouth daily at bedtime as needed for anxiety  , Disp: , Rfl:     LORazepam (ATIVAN) 2 mg tablet, Take by mouth, Disp: , Rfl:     pantoprazole (PROTONIX) 40 mg tablet, Take 40 mg by mouth 2 (two) times a day, Disp: , Rfl:     SYMBICORT 160-4 5 MCG/ACT inhaler, , Disp: , Rfl:     Current Allergies     Allergies as of 04/30/2018 - Reviewed 04/30/2018   Allergen Reaction Noted    Lyrica [pregabalin] Other (See Comments) 09/25/2017            The following portions of the patient's history were reviewed and updated as appropriate: allergies, current medications, past family history, past medical history, past social history, past surgical history and problem list      Past Medical History:   Diagnosis Date    Bipolar disorder ([address] )     Depression     GERD (gastroesophageal reflux disease)     Headache     after anesthesia    migraines     Wears glasses        Past Surgical History:   Procedure Laterality Date    CYST REMOVAL Right     hand    CYSTOSCOPY N/A 2/8/2017    Procedure: CYSTOSCOPY;  Surgeon: Bettie James MD;  [facility];  Service:     EXAMINATION UNDER ANESTHESIA N/A 2/8/2017    Procedure: EXAM UNDER ANESTHESIA, RESECTION OF VAGINAL GRAFT;  Surgeon: Bettie James MD;  [facility];  Service:     HYSTERECTOMY      [facility] CYSTOURETHROSCOPY N/A 9/27/2017    Procedure: Kobe Yesenia;  Surgeon: Bettie James MD;  [facility];  Service: UroGynecology           AL REVISION VAGINAL GRAFT N/A 9/27/2017    Procedure: RESECTION OF EXPOSED MESH VAGINAL AND PARAVAGINAL TISSUE;  Surgeon: Bettie James MD;  [facility];  Service: UroGynecology           SPINAL FUSION      C5-C6 and C6-C7    TONSILLECTOMY      TUBAL LIGATION         No family history on file  Medications have been verified  Objective   BP (!) 171/79   Pulse 93   Temp 99 5 °F (37 5 °C) (Tympanic)   SpO2 97%        Physical Exam     Physical Exam   Constitutional: She is oriented to person, place, and time  She appears well-developed and well-nourished  No distress  HENT:   Head: Normocephalic and atraumatic  Right Ear: Tympanic membrane, external ear and ear canal normal    Left Ear: Tympanic membrane, external ear and ear canal normal    Nose: Rhinorrhea present  Mouth/Throat: Uvula is midline, oropharynx is clear and moist and mucous membranes are normal    Eyes: Conjunctivae and EOM are normal  Right eye exhibits no discharge  Cardiovascular: Normal rate, regular rhythm and normal heart sounds  Exam reveals no gallop and no friction rub  No murmur heard  Pulmonary/Chest: Effort normal  No respiratory distress  She has wheezes in the right upper field, the right middle field and the right lower field  She has no rales  Musculoskeletal: Normal range of motion  Neurological: She is alert and oriented to person, place, and time  No cranial nerve deficit   Coordination normal  Skin: Skin is warm and dry  She is not diaphoretic  Psychiatric: She has a normal mood and affect  Nursing note and vitals reviewed

## 2018-04-30 NOTE — PATIENT INSTRUCTIONS
Take Prednisone as already prescribed by PCP  Add Zithromax  Monitor Blood Pressure- consult PCP if remains elevated  Plain Mucinex as directed  Call or return for problems/concerns  Work note given

## 2018-05-05 ENCOUNTER — APPOINTMENT (OUTPATIENT)
Dept: RADIOLOGY | Facility: CLINIC | Age: 58
End: 2018-05-05
Payer: COMMERCIAL

## 2018-05-05 ENCOUNTER — OFFICE VISIT (OUTPATIENT)
Dept: URGENT CARE | Facility: CLINIC | Age: 58
End: 2018-05-05
Payer: COMMERCIAL

## 2018-05-05 VITALS
SYSTOLIC BLOOD PRESSURE: 137 MMHG | OXYGEN SATURATION: 98 % | DIASTOLIC BLOOD PRESSURE: 80 MMHG | TEMPERATURE: 97.6 F | HEART RATE: 72 BPM | RESPIRATION RATE: 16 BRPM

## 2018-05-05 DIAGNOSIS — J18.9 PNEUMONIA DUE TO INFECTIOUS ORGANISM, UNSPECIFIED LATERALITY, UNSPECIFIED PART OF LUNG: Primary | ICD-10-CM

## 2018-05-05 DIAGNOSIS — R05.9 COUGH: ICD-10-CM

## 2018-05-05 PROCEDURE — 99214 OFFICE O/P EST MOD 30 MIN: CPT | Performed by: PHYSICIAN ASSISTANT

## 2018-05-05 PROCEDURE — 71046 X-RAY EXAM CHEST 2 VIEWS: CPT

## 2018-05-05 PROCEDURE — 94640 AIRWAY INHALATION TREATMENT: CPT | Performed by: PHYSICIAN ASSISTANT

## 2018-05-05 RX ORDER — IPRATROPIUM BROMIDE AND ALBUTEROL SULFATE 2.5; .5 MG/3ML; MG/3ML
3 SOLUTION RESPIRATORY (INHALATION) ONCE
Status: COMPLETED | OUTPATIENT
Start: 2018-05-05 | End: 2018-05-05

## 2018-05-05 RX ORDER — LEVOFLOXACIN 500 MG/1
500 TABLET, FILM COATED ORAL EVERY 24 HOURS
Qty: 7 TABLET | Refills: 0 | Status: SHIPPED | OUTPATIENT
Start: 2018-05-05 | End: 2018-05-12

## 2018-05-05 RX ORDER — ALBUTEROL SULFATE 90 UG/1
2 AEROSOL, METERED RESPIRATORY (INHALATION) EVERY 4 HOURS PRN
Qty: 1 INHALER | Refills: 0 | Status: SHIPPED | OUTPATIENT
Start: 2018-05-05 | End: 2020-09-21 | Stop reason: SDUPTHER

## 2018-05-05 RX ORDER — IPRATROPIUM BROMIDE AND ALBUTEROL SULFATE 2.5; .5 MG/3ML; MG/3ML
3 SOLUTION RESPIRATORY (INHALATION)
Status: DISCONTINUED | OUTPATIENT
Start: 2018-05-05 | End: 2018-05-05

## 2018-05-05 RX ADMIN — IPRATROPIUM BROMIDE AND ALBUTEROL SULFATE 3 ML: 2.5; .5 SOLUTION RESPIRATORY (INHALATION) at 11:39

## 2018-05-05 NOTE — PATIENT INSTRUCTIONS
I have prescribed an antibiotic for the infection  Please take the antibiotic as prescribed and finish the entire prescription  I recommend that the patient takes an over the counter probiotic or eats yogurt with live cultures in it [place]) to keep good bacteria in the gut and help prevent diarrhea  Wash hands frequently to prevent the spread of infection  Can use over the counter cough and cold medications to help with symptoms  Ibuprofen and/or tylenol as needed for pain or fever  If not improving over the next 7-10 days, follow up with PCP  Use albuterol inhaler every 4-6 hours as needed  Recommend that use at least 3 times a day for the next 3-4 days

## 2018-05-05 NOTE — PROGRESS NOTES
[facility]    NAME: Lv Rubio is a 66 y o  female  : 1960    MRN: [id number]  DATE: May 5, 2018  TIME: 12:52 PM    Assessment and Plan   Pneumonia due to infectious organism, unspecified laterality, unspecified part of lung [J18 9]  1  Pneumonia due to infectious organism, unspecified laterality, unspecified part of lung  levofloxacin (LEVAQUIN) 500 mg tablet    albuterol (PROVENTIL HFA,VENTOLIN HFA) 90 mcg/act inhaler   2  Cough  XR chest pa & lateral    ipratropium-albuterol (DUO-NEB) 0 5-2 5 mg/3 mL inhalation solution 3 mL    DISCONTINUED: ipratropium-albuterol (DUO-NEB) 0 5-2 5 mg/3 mL inhalation solution 3 mL     Patient was given a DuoNeb treatment today in the office  Symptoms improved post treatment  Lungs are also clear to auscultation post treatment  Patient Instructions   Patient Instructions   I have prescribed an antibiotic for the infection  Please take the antibiotic as prescribed and finish the entire prescription  I recommend that the patient takes an over the counter probiotic or eats yogurt with live cultures in it [place]) to keep good bacteria in the gut and help prevent diarrhea  Wash hands frequently to prevent the spread of infection  Can use over the counter cough and cold medications to help with symptoms  Ibuprofen and/or tylenol as needed for pain or fever  If not improving over the next 7-10 days, follow up with PCP  Use albuterol inhaler every 4-6 hours as needed  Recommend that use at least 3 times a day for the next 3-4 days  Chief Complaint     Chief Complaint   Patient presents with    Cough     x 5 days       History of Present Illness   70-year-old female here with ongoing cough and chest congestion  Patient was seen about 6 days ago and prescribed Zithromax  She does not feel like she is getting any better  Patient was also on prednisone for contact dermatitis  She has ongoing cough  Also feels wheezing at times    No fever or chills  Cough is productive with a thick mucus  Review of Systems   Review of Systems   Constitutional: Negative for activity change, appetite change, chills, diaphoresis, fatigue, fever and unexpected weight change  HENT: Positive for sore throat  Negative for congestion, dental problem, hearing loss, sinus pressure, sneezing, tinnitus, trouble swallowing and voice change  Eyes: Negative for photophobia, redness and visual disturbance  Respiratory: Positive for cough, chest tightness and wheezing  Negative for apnea, shortness of breath and stridor  Cardiovascular: Negative for chest pain, palpitations and leg swelling  Gastrointestinal: Negative for abdominal distention, abdominal pain, blood in stool, constipation, diarrhea, nausea and vomiting  Endocrine: Negative for cold intolerance, heat intolerance, polydipsia, polyphagia and polyuria  Genitourinary: Negative for difficulty urinating, dysuria, flank pain, frequency, hematuria and urgency  Musculoskeletal: Negative for arthralgias, back pain, gait problem, joint swelling, myalgias, neck pain and neck stiffness  Skin: Negative for pallor, rash and wound  Neurological: Negative for dizziness, tremors, seizures, speech difficulty, weakness and headaches  Hematological: Negative for adenopathy  Does not bruise/bleed easily  Psychiatric/Behavioral: Negative for agitation, confusion, dysphoric mood and sleep disturbance  The patient is not nervous/anxious  All other systems reviewed and are negative        Current Medications     Current Outpatient Prescriptions:     divalproex sodium (DEPAKOTE) 500 mg EC tablet, Take by mouth, Disp: , Rfl:     pantoprazole (PROTONIX) 40 mg tablet, Take 40 mg by mouth 2 (two) times a day, Disp: , Rfl:     QUEtiapine (SEROquel) 300 mg tablet, Take 300 mg by mouth, Disp: , Rfl:     SYMBICORT 160-4 5 MCG/ACT inhaler, , Disp: , Rfl:     albuterol (PROVENTIL HFA,VENTOLIN HFA) 90 mcg/act inhaler, Inhale 2 puffs every 4 (four) hours as needed for wheezing, Disp: 1 Inhaler, Rfl: 0    benzonatate (TESSALON) 200 MG capsule, , Disp: , Rfl:     butalbital-acetaminophen-caffeine (FIORICET,ESGIC) -40 mg per tablet, as needed, Disp: , Rfl:     estradiol (ESTRACE VAGINAL) 0 1 mg/g vaginal cream, , Disp: , Rfl:     levofloxacin (LEVAQUIN) 500 mg tablet, Take 1 tablet (500 mg total) by mouth every 24 hours for 7 days, Disp: 7 tablet, Rfl: 0    LORazepam (ATIVAN) 0 5 mg tablet, Take 0 5 mg by mouth daily at bedtime as needed for anxiety  , Disp: , Rfl:     LORazepam (ATIVAN) 2 mg tablet, Take by mouth, Disp: , Rfl:     omeprazole (PriLOSEC) 40 MG capsule, , Disp: , Rfl:   No current facility-administered medications for this visit       Current Allergies     Allergies as of 05/05/2018 - Reviewed 05/05/2018   Allergen Reaction Noted    Lyrica [pregabalin] Other (See Comments) 09/25/2017          The following portions of the patient's history were reviewed and updated as appropriate: allergies, current medications, past family history, past medical history, past social history, past surgical history and problem list    Past Medical History:   Diagnosis Date    Bipolar disorder ([facility] [address] )     Depression     GERD (gastroesophageal reflux disease)     Headache     after anesthesia    migraines     Wears glasses      Past Surgical History:   Procedure Laterality Date    CYST REMOVAL Right     hand    CYSTOSCOPY N/A 2/8/2017    Procedure: CYSTOSCOPY;  Surgeon: Nathaniel Davies MD;  [facility];  Service:     EXAMINATION UNDER ANESTHESIA N/A 2/8/2017    Procedure: EXAM UNDER ANESTHESIA, RESECTION OF VAGINAL GRAFT;  Surgeon: Nathaniel Davies MD;  [facility];  Service:     HYSTERECTOMY      AZ CYSTOURETHROSCOPY N/A 9/27/2017    Procedure: Sohan First;  Surgeon: Nathaniel Davies MD;  [facility];  Service: UroGynecology           [place] REVISION VAGINAL GRAFT N/A 9/27/2017    Procedure: RESECTION OF EXPOSED MESH VAGINAL AND PARAVAGINAL TISSUE;  Surgeon: Sangeeta Candelaria MD;  [facility];  Service: UroGynecology           SPINAL FUSION      C5-C6 and C6-C7    TONSILLECTOMY      TUBAL LIGATION       No family history on file  Medications have been verified  Objective   /80   Pulse 72   Temp 97 6 °F (36 4 °C)   Resp 16   SpO2 98%      Physical Exam   Physical Exam   Constitutional: She appears well-developed and well-nourished  No distress  HENT:   Head: Normocephalic  Right Ear: Tympanic membrane and external ear normal    Left Ear: Tympanic membrane and external ear normal    Nose: Mucosal edema present  Mouth/Throat: Posterior oropharyngeal erythema present  No oropharyngeal exudate  Neck: Normal range of motion  Neck supple  Cardiovascular: Normal rate, regular rhythm and normal heart sounds  No murmur heard  Pulmonary/Chest: Effort normal  No respiratory distress  She has wheezes (Diffuse)  She has no rales  Abdominal: Soft  Bowel sounds are normal  There is no tenderness  Musculoskeletal: Normal range of motion  Lymphadenopathy:     She has no cervical adenopathy  Skin: Skin is warm  No rash noted

## 2018-07-10 ENCOUNTER — OFFICE VISIT (OUTPATIENT)
Dept: URGENT CARE | Facility: CLINIC | Age: 58
End: 2018-07-10
Payer: COMMERCIAL

## 2018-07-10 VITALS
OXYGEN SATURATION: 98 % | TEMPERATURE: 97.5 F | RESPIRATION RATE: 17 BRPM | HEART RATE: 70 BPM | WEIGHT: 195 LBS | HEIGHT: 62 IN | BODY MASS INDEX: 35.88 KG/M2 | SYSTOLIC BLOOD PRESSURE: 163 MMHG | DIASTOLIC BLOOD PRESSURE: 79 MMHG

## 2018-07-10 DIAGNOSIS — K11.5 SALIVARY DUCT STONE: Primary | ICD-10-CM

## 2018-07-10 PROCEDURE — 99213 OFFICE O/P EST LOW 20 MIN: CPT | Performed by: PHYSICIAN ASSISTANT

## 2018-07-10 RX ORDER — AMOXICILLIN 875 MG/1
875 TABLET, COATED ORAL 2 TIMES DAILY
Qty: 14 TABLET | Refills: 0 | Status: SHIPPED | OUTPATIENT
Start: 2018-07-10 | End: 2018-07-17

## 2018-07-10 NOTE — PROGRESS NOTES
[facility]    NAME: Nash Rene is a 66 y o  female  : 1960    MRN: [id number]  DATE: July 10, 2018  TIME: 4:59 PM    Assessment and Plan   Salivary duct stone [K11 5]  1  Salivary duct stone  amoxicillin (AMOXIL) 875 mg tablet       Patient Instructions     Patient Instructions   Start antibiotic take as directed  Recommend patient suck on sour items such as galindo, sour balls  Patient recommended warm compresses and massage  If not improved over the next week, follow up with ear nose and throat  Chief Complaint     Chief Complaint   Patient presents with    Swollen Glands     pt c/o of having swollen gland       History of Present Illness   82-year-old female here with complaint of enlarged gland on the right side of her face  Patient states she has had parotid issues before  Area is tender to palpation  No fever or chills  Patient states that her mouth has been dry  She has been trying to suck on galindo with no relief  Review of Systems   Review of Systems   Constitutional: Negative for activity change, appetite change, chills, diaphoresis, fatigue, fever and unexpected weight change  HENT: Positive for facial swelling (Right parotid)  Negative for congestion, dental problem, hearing loss, sinus pressure, sneezing, sore throat, tinnitus, trouble swallowing and voice change  Eyes: Negative for photophobia, redness and visual disturbance  Respiratory: Negative for apnea, cough, chest tightness, shortness of breath, wheezing and stridor  Cardiovascular: Negative for chest pain, palpitations and leg swelling  Gastrointestinal: Negative for abdominal distention, abdominal pain, blood in stool, constipation, diarrhea, nausea and vomiting  Endocrine: Negative for cold intolerance, heat intolerance, polydipsia, polyphagia and polyuria  Genitourinary: Negative for difficulty urinating, dysuria, flank pain, frequency, hematuria and urgency     Musculoskeletal: Negative for arthralgias, back pain, gait problem, joint swelling, myalgias, neck pain and neck stiffness  Skin: Negative for pallor, rash and wound  Neurological: Negative for dizziness, tremors, seizures, speech difficulty, weakness and headaches  Hematological: Negative for adenopathy  Does not bruise/bleed easily  Psychiatric/Behavioral: Negative for agitation, confusion, dysphoric mood and sleep disturbance  The patient is not nervous/anxious  All other systems reviewed and are negative        Current Medications     Current Outpatient Prescriptions:     divalproex sodium (DEPAKOTE) 500 mg EC tablet, Take by mouth, Disp: , Rfl:     pantoprazole (PROTONIX) 40 mg tablet, Take 40 mg by mouth 2 (two) times a day, Disp: , Rfl:     QUEtiapine (SEROquel) 300 mg tablet, Take 300 mg by mouth, Disp: , Rfl:     albuterol (PROVENTIL HFA,VENTOLIN HFA) 90 mcg/act inhaler, Inhale 2 puffs every 4 (four) hours as needed for wheezing, Disp: 1 Inhaler, Rfl: 0    amoxicillin (AMOXIL) 875 mg tablet, Take 1 tablet (875 mg total) by mouth 2 (two) times a day for 7 days, Disp: 14 tablet, Rfl: 0    benzonatate (TESSALON) 200 MG capsule, , Disp: , Rfl:     butalbital-acetaminophen-caffeine (FIORICET,ESGIC) -40 mg per tablet, as needed, Disp: , Rfl:     estradiol (ESTRACE VAGINAL) 0 1 mg/g vaginal cream, , Disp: , Rfl:     LORazepam (ATIVAN) 0 5 mg tablet, Take 0 5 mg by mouth daily at bedtime as needed for anxiety  , Disp: , Rfl:     LORazepam (ATIVAN) 2 mg tablet, Take by mouth, Disp: , Rfl:     omeprazole (PriLOSEC) 40 MG capsule, , Disp: , Rfl:     SYMBICORT 160-4 5 MCG/ACT inhaler, , Disp: , Rfl:     Current Allergies     Allergies as of 07/10/2018 - Reviewed 07/10/2018   Allergen Reaction Noted    Lyrica [pregabalin] Other (See Comments) 09/25/2017          The following portions of the patient's history were reviewed and updated as appropriate: allergies, current medications, past family history, past medical history, past social history, past surgical history and problem list    Past Medical History:   Diagnosis Date    Bipolar disorder ([address] )     Depression     GERD (gastroesophageal reflux disease)     Headache     after anesthesia    migraines     Wears glasses      Past Surgical History:   Procedure Laterality Date    CYST REMOVAL Right     hand    CYSTOSCOPY N/A 2/8/2017    Procedure: CYSTOSCOPY;  Surgeon: Shereen Bishop MD;  [facility];  Service:     EXAMINATION UNDER ANESTHESIA N/A 2/8/2017    Procedure: EXAM UNDER ANESTHESIA, RESECTION OF VAGINAL GRAFT;  Surgeon: Shereen Bishop MD;  [facility];  Service:     HYSTERECTOMY      [place] CYSTOURETHROSCOPY N/A 9/27/2017    Procedure: Sydelle Boys;  Surgeon: Shereen Bishop MD;  [facility];  Service: UroGynecology           [place] REVISION VAGINAL GRAFT N/A 9/27/2017    Procedure: RESECTION OF EXPOSED MESH VAGINAL AND PARAVAGINAL TISSUE;  Surgeon: Shereen Bishop MD;  [facility];  Service: UroGynecology           SPINAL FUSION      C5-C6 and C6-C7    TONSILLECTOMY      TUBAL LIGATION       No family history on file  Social History     Social History    Marital status: /Civil Union     Spouse name: N/A    Number of children: N/A    Years of education: N/A     Occupational History    Not on file  Social History Main Topics    Smoking status: Current Every Day Smoker     Packs/day: 0 50     Years: 20 00    Smokeless tobacco: Never Used      Comment: 1 pack a week    Alcohol use Yes      Comment: socially    Drug use: No    Sexual activity: Not on file     Other Topics Concern    Not on file     Social History Narrative    No narrative on file     Medications have been verified      Objective   /79   Pulse 70   Temp 97 5 °F (36 4 °C)   Resp 17   Ht 5' 2" (1 575 m)   Wt 88 5 kg (195 lb)   SpO2 98%   BMI 35 67 kg/m²      Physical Exam   Physical Exam   Constitutional: She appears well-developed and well-nourished  No distress  HENT:   Head: Normocephalic  Right Ear: Tympanic membrane and external ear normal    Left Ear: Tympanic membrane and external ear normal    Nose: Nose normal    Mouth/Throat: Oropharynx is clear and moist  No oropharyngeal exudate  Neck: Normal range of motion  Neck supple  Cardiovascular: Normal rate, regular rhythm and normal heart sounds  No murmur heard  Pulmonary/Chest: Effort normal and breath sounds normal  No respiratory distress  She has no wheezes  She has no rales  Abdominal: Soft  Bowel sounds are normal  There is no tenderness  Musculoskeletal: Normal range of motion  Lymphadenopathy:     She has no cervical adenopathy  Skin: Skin is warm  No rash noted  Vitals reviewed

## 2018-07-10 NOTE — PATIENT INSTRUCTIONS
Start antibiotic take as directed  Recommend patient suck on sour items such as galindo, sour balls  Patient recommended warm compresses and massage  If not improved over the next week, follow up with ear nose and throat

## 2018-10-17 ENCOUNTER — APPOINTMENT (OUTPATIENT)
Dept: LAB | Facility: HOSPITAL | Age: 58
End: 2018-10-17
Attending: ANESTHESIOLOGY
Payer: COMMERCIAL

## 2018-10-17 ENCOUNTER — TRANSCRIBE ORDERS (OUTPATIENT)
Dept: ADMINISTRATIVE | Facility: HOSPITAL | Age: 58
End: 2018-10-17

## 2018-10-17 DIAGNOSIS — Z02.1 PRE-EMPLOYMENT HEALTH SCREENING EXAMINATION: ICD-10-CM

## 2018-10-17 DIAGNOSIS — Z02.1 PRE-EMPLOYMENT HEALTH SCREENING EXAMINATION: Primary | ICD-10-CM

## 2018-10-17 PROCEDURE — 36415 COLL VENOUS BLD VENIPUNCTURE: CPT

## 2018-10-17 PROCEDURE — 86765 RUBEOLA ANTIBODY: CPT

## 2018-10-17 PROCEDURE — 86762 RUBELLA ANTIBODY: CPT

## 2018-10-17 PROCEDURE — 86735 MUMPS ANTIBODY: CPT

## 2018-10-17 PROCEDURE — 86706 HEP B SURFACE ANTIBODY: CPT

## 2018-10-18 LAB
HBV SURFACE AG SER QL: NORMAL
MEV IGG SER QL: NORMAL
MUV IGG SER QL: NORMAL
RUBV IGG SERPL IA-ACNC: 108.4 IU/ML

## 2018-10-19 LAB — HBV SURFACE AB SER-ACNC: <3.1 MIU/ML

## 2019-01-09 ENCOUNTER — OFFICE VISIT (OUTPATIENT)
Dept: URGENT CARE | Facility: CLINIC | Age: 59
End: 2019-01-09
Payer: COMMERCIAL

## 2019-01-09 VITALS
HEIGHT: 62 IN | RESPIRATION RATE: 16 BRPM | WEIGHT: 195 LBS | BODY MASS INDEX: 35.88 KG/M2 | HEART RATE: 75 BPM | OXYGEN SATURATION: 99 % | SYSTOLIC BLOOD PRESSURE: 144 MMHG | TEMPERATURE: 97.8 F | DIASTOLIC BLOOD PRESSURE: 90 MMHG

## 2019-01-09 DIAGNOSIS — M54.2 CERVICALGIA: Primary | ICD-10-CM

## 2019-01-09 PROCEDURE — 96372 THER/PROPH/DIAG INJ SC/IM: CPT | Performed by: NURSE PRACTITIONER

## 2019-01-09 PROCEDURE — 99213 OFFICE O/P EST LOW 20 MIN: CPT | Performed by: NURSE PRACTITIONER

## 2019-01-09 RX ORDER — NAPROXEN 500 MG/1
250 TABLET ORAL 2 TIMES DAILY WITH MEALS
Qty: 20 TABLET | Refills: 0 | Status: SHIPPED | OUTPATIENT
Start: 2019-01-09 | End: 2020-09-21 | Stop reason: SDUPTHER

## 2019-01-09 RX ORDER — METHOCARBAMOL 750 MG/1
750 TABLET, FILM COATED ORAL EVERY 8 HOURS SCHEDULED
Qty: 20 TABLET | Refills: 0 | Status: SHIPPED | OUTPATIENT
Start: 2019-01-09 | End: 2020-09-21 | Stop reason: SDUPTHER

## 2019-01-09 RX ORDER — KETOROLAC TROMETHAMINE 30 MG/ML
30 INJECTION, SOLUTION INTRAMUSCULAR; INTRAVENOUS ONCE
Status: COMPLETED | OUTPATIENT
Start: 2019-01-09 | End: 2019-01-09

## 2019-01-09 RX ADMIN — KETOROLAC TROMETHAMINE 30 MG: 30 INJECTION, SOLUTION INTRAMUSCULAR; INTRAVENOUS at 18:04

## 2019-01-09 NOTE — PATIENT INSTRUCTIONS
Acute Neck Pain   WHAT YOU NEED TO KNOW:   Acute neck pain starts suddenly, increases quickly, and goes away in a few days  The pain may come and go, or be worse with certain movements  The pain may be only in your neck, or it may move to your arms, back, or shoulders  You may also have pain that starts in another body area and moves to your neck  DISCHARGE INSTRUCTIONS:   Return to the emergency department if:   · You have an injury that causes neck pain and shooting pain down your arms or legs  · Your neck pain suddenly becomes severe  · You have neck pain along with numbness, tingling, or weakness in your arms or legs  · You have a stiff neck, a headache, and a fever  Contact your healthcare provider if:   · You have new or worsening symptoms  · Your symptoms continue even after treatment  · You have questions or concerns about your condition or care  Medicines:   · NSAIDs , such as ibuprofen, help decrease swelling, pain, and fever  This medicine is available without a doctor's order  Ask your healthcare provider which medicine to take and how often to take it  Follow directions  NSAIDs can cause stomach bleeding or kidney problems if not taken correctly  If you take blood thinner medicine, always ask if NSAIDs are safe for you  · Acetaminophen  helps decrease pain and fever  Ask your healthcare provider how much to take and how often to take it  Follow directions  Acetaminophen can cause liver damage if not taken correctly  · Steroid medicine  may be used to reduce inflammation  This can help relieve pain caused by swelling  · Take your medicine as directed  Contact your healthcare provider if you think your medicine is not helping or if you have side effects  Tell him or her if you are allergic to any medicine  Keep a list of the medicines, vitamins, and herbs you take  Include the amounts, and when and why you take them  Bring the list or the pill bottles to follow-up visits  Carry your medicine list with you in case of an emergency  Manage or prevent acute neck pain:   · Rest your neck as directed  Do not make sudden movements, such as turning your head quickly  Your healthcare provider may recommend you wear a cervical collar for a short time  The collar will prevent you from moving your head  This will give your neck time to heal if an injury is causing your neck pain  Ask your healthcare provider when you can return to sports or other normal daily activities  · Apply heat as directed  Heat helps relieve pain and swelling  Use a heat wrap, or soak a small towel in warm water  Wring out the extra water  Apply the heat wrap or towel for 20 minutes every hour, or as directed  · Apply ice as directed  Ice helps relieve pain and swelling, and can help prevent tissue damage  Use an ice pack, or put ice in a bag  Cover the ice pack or back with a towel before you apply it to your neck  Apply the ice pack or ice for 15 minutes every hour, or as directed  Your healthcare provider can tell you how often to apply ice  · Do neck exercises as directed  Neck exercises help strengthen the muscles and increase range of motion  Your healthcare provider will tell you which exercises are right for you  He may give you instructions, or he may recommend that you work with a physical therapist  Your healthcare provider or therapist can make sure you are doing the exercises correctly  · Maintain good posture  Try to keep your head and shoulders lifted when you sit  If you work in front of a computer, make sure the monitor is at the right level  You should not need to look up down to see the screen  You should also not have to lean forward to be able to read what is on the screen  Make sure your keyboard, mouse, and other computer items are placed where you do not have to extend your shoulder to reach them  Get up often if you work in front of a computer or sit for long periods of time  Stretch or walk around to keep your neck muscles loose  Follow up with your healthcare provider as directed: Your healthcare provider may refer you to a specialist if your pain does not get better with treatment  Write down your questions so you remember to ask them during your visits  © 2017 [address] [employer] Information is for End User's use only and may not be sold, redistributed or otherwise used for commercial purposes  All illustrations and images included in CareNotes® are the copyrighted property of [employer]  or [employer] Ayon  The above information is an  only  It is not intended as medical advice for individual conditions or treatments  Talk to your doctor, nurse or pharmacist before following any medical regimen to see if it is safe and effective for you

## 2019-01-09 NOTE — PROGRESS NOTES
[facility]        NAME: Cm Hodge is a 66 y o  female  : 1960    MRN: [id number]  DATE: 2019  TIME: 5:27 PM    Assessment and Plan   Cervicalgia [M54 2]  1  Cervicalgia  naproxen (NAPROSYN) 500 mg tablet    methocarbamol (ROBAXIN) 750 mg tablet    ketorolac (TORADOL) injection 30 mg         Patient Instructions   Apply ice or heat to neck for 10-15 minutes every 1-2 hours as needed  Follow up with PCP in 3-5 days  Proceed to  ER if symptoms worsen  Chief Complaint     Chief Complaint   Patient presents with    Headache    Neck Pain     x 5 days         History of Present Illness       Neck Pain    This is a recurrent problem  The current episode started yesterday  The problem occurs constantly  The problem has been unchanged  Associated with: Woke up with pain  History of cervical fusion in   The pain is present in the left side  The pain is at a severity of 8/10  Exacerbated by: Movement  Pertinent negatives include no numbness or weakness  She has tried NSAIDs and ice for the symptoms  The treatment provided no relief  Review of Systems   Review of Systems   Constitutional: Negative  HENT: Negative  Eyes: Negative  Respiratory: Negative  Cardiovascular: Negative  Gastrointestinal: Negative  Genitourinary: Negative  Musculoskeletal: Positive for neck pain  Neurological: Negative  Negative for weakness and numbness           Current Medications       Current Outpatient Prescriptions:     albuterol (PROVENTIL HFA,VENTOLIN HFA) 90 mcg/act inhaler, Inhale 2 puffs every 4 (four) hours as needed for wheezing, Disp: 1 Inhaler, Rfl: 0    butalbital-acetaminophen-caffeine (FIORICET,ESGIC) -40 mg per tablet, as needed, Disp: , Rfl:     divalproex sodium (DEPAKOTE) 500 mg EC tablet, Take by mouth, Disp: , Rfl:     estradiol (ESTRACE VAGINAL) 0 1 mg/g vaginal cream, , Disp: , Rfl:     LORazepam (ATIVAN) 0 5 mg tablet, Take 0 5 mg by mouth daily at bedtime as needed for anxiety  , Disp: , Rfl:     LORazepam (ATIVAN) 2 mg tablet, Take by mouth, Disp: , Rfl:     pantoprazole (PROTONIX) 40 mg tablet, Take 40 mg by mouth 2 (two) times a day, Disp: , Rfl:     QUEtiapine (SEROquel) 300 mg tablet, Take 300 mg by mouth, Disp: , Rfl:     methocarbamol (ROBAXIN) 750 mg tablet, Take 1 tablet (750 mg total) by mouth every 8 (eight) hours, Disp: 20 tablet, Rfl: 0    naproxen (NAPROSYN) 500 mg tablet, Take 0 5 tablets (250 mg total) by mouth 2 (two) times a day with meals for 10 days, Disp: 20 tablet, Rfl: 0    omeprazole (PriLOSEC) 40 MG capsule, , Disp: , Rfl:     SYMBICORT 160-4 5 MCG/ACT inhaler, , Disp: , Rfl:     Current Facility-Administered Medications:     ketorolac (TORADOL) injection 30 mg, 30 mg, Intramuscular, Once, HEIDE Ramos    Current Allergies     Allergies as of 01/09/2019 - Reviewed 01/09/2019   Allergen Reaction Noted    Lyrica [pregabalin] Other (See Comments) 09/25/2017            The following portions of the patient's history were reviewed and updated as appropriate: allergies, current medications, past family history, past medical history, past social history, past surgical history and problem list      Past Medical History:   Diagnosis Date    Bipolar disorder ([facility] [address] )     Depression     GERD (gastroesophageal reflux disease)     Headache     after anesthesia    migraines     Wears glasses        Past Surgical History:   Procedure Laterality Date    CYST REMOVAL Right     hand    CYSTOSCOPY N/A 2/8/2017    Procedure: CYSTOSCOPY;  Surgeon: Pete Holden MD;  [facility];  Service:     EXAMINATION UNDER ANESTHESIA N/A 2/8/2017    Procedure: EXAM UNDER ANESTHESIA, RESECTION OF VAGINAL GRAFT;  Surgeon: Pete Holden MD;  [facility];  Service:     HYSTERECTOMY      NECK SURGERY      PA CYSTOURETHROSCOPY N/A 9/27/2017    Procedure: Fabiola Amend;  Surgeon: Pete Holden MD;  [facility]; Service: UroGynecology           GA REVISION VAGINAL GRAFT N/A 9/27/2017    Procedure: RESECTION OF EXPOSED MESH VAGINAL AND PARAVAGINAL TISSUE;  Surgeon: Anna Allen MD;  [facility];  Service: UroGynecology           SPINAL FUSION      C5-C6 and C6-C7    TONSILLECTOMY      TUBAL LIGATION         History reviewed  No pertinent family history  Medications have been verified  Objective   /90   Pulse 75   Temp 97 8 °F (36 6 °C)   Resp 16   Ht 5' 2" (1 575 m)   Wt 88 5 kg (195 lb)   SpO2 99%   BMI 35 67 kg/m²        Physical Exam     Physical Exam   Constitutional: She is oriented to person, place, and time  She appears well-developed and well-nourished  She appears distressed (Mild distress due to pain )  HENT:   Head: Normocephalic and atraumatic  Right Ear: External ear normal    Left Ear: External ear normal    Nose: Nose normal    Mouth/Throat: Oropharynx is clear and moist    Eyes: Pupils are equal, round, and reactive to light  Conjunctivae and EOM are normal    Neck: Normal range of motion  Neck supple  Cardiovascular: Normal rate, regular rhythm and normal heart sounds  Pulmonary/Chest: Effort normal and breath sounds normal  No respiratory distress  She has no wheezes  She has no rales  Abdominal: Soft  Bowel sounds are normal    Musculoskeletal:        Cervical back: She exhibits decreased range of motion (In all planes due to pain ) and spasm (Left-sided cervical paraspinal muscle spasms  Left trapezius muscle spasm  )  Lymphadenopathy:     She has no cervical adenopathy  Neurological: She is alert and oriented to person, place, and time  She has normal reflexes  She displays normal reflexes  No cranial nerve deficit  Coordination normal    Skin: Skin is warm and dry  No rash noted  She is not diaphoretic  No erythema  Psychiatric: She has a normal mood and affect   Her behavior is normal  Judgment and thought content normal

## 2019-02-25 ENCOUNTER — OFFICE VISIT (OUTPATIENT)
Dept: URGENT CARE | Facility: CLINIC | Age: 59
End: 2019-02-25
Payer: COMMERCIAL

## 2019-02-25 VITALS
TEMPERATURE: 99 F | OXYGEN SATURATION: 97 % | HEART RATE: 80 BPM | WEIGHT: 200 LBS | DIASTOLIC BLOOD PRESSURE: 70 MMHG | HEIGHT: 62 IN | BODY MASS INDEX: 36.8 KG/M2 | SYSTOLIC BLOOD PRESSURE: 124 MMHG | RESPIRATION RATE: 18 BRPM

## 2019-02-25 DIAGNOSIS — A08.4 VIRAL GASTROENTERITIS: Primary | ICD-10-CM

## 2019-02-25 PROCEDURE — 99213 OFFICE O/P EST LOW 20 MIN: CPT | Performed by: PHYSICIAN ASSISTANT

## 2019-02-25 RX ORDER — ONDANSETRON 4 MG/1
4 TABLET, FILM COATED ORAL EVERY 8 HOURS PRN
Qty: 10 TABLET | Refills: 0 | Status: SHIPPED | OUTPATIENT
Start: 2019-02-25 | End: 2020-09-21 | Stop reason: SDUPTHER

## 2019-02-25 NOTE — PROGRESS NOTES
[facility]        NAME: Luis Carlos Delgado is a 64 y o  female  : 1960    MRN: [id number]  DATE: 2019  TIME: 3:55 PM    Assessment and Plan   Viral gastroenteritis [A08 4]  1  Viral gastroenteritis  ondansetron (ZOFRAN) 4 mg tablet         Patient Instructions     Morris diet- rice, apples, toast, crackers  Imodium for diarrhea  Encourage fluids and hydration  Zofran for nausea as needed  Follow up with PCP in 3-5 days  Proceed to  ER if symptoms worsen  Gastroenteritis   AMBULATORY CARE:   Gastroenteritis , or stomach flu, is an infection of the stomach and intestines  It is caused by bacteria, parasites, or viruses  Common symptoms include the following:   · Diarrhea or gas    · Nausea, vomiting, or poor appetite    · Abdominal cramps, pain, or gurgling    · Fever    · Tiredness or weakness    · Headaches or muscle aches with any of the above symptoms  Call 911 for any of the following:   · You have trouble breathing or a very fast pulse  Seek care immediately if:   · You see blood in your diarrhea  · You cannot stop vomiting  · You have not urinated for 12 hours  · You feel like you are going to faint  Contact your healthcare provider if:   · You have a fever  · You continue to vomit or have diarrhea, even after treatment  · You see worms in your diarrhea  · Your mouth or eyes are dry  You are not urinating as much or as often  · You have questions or concerns about your condition or care  Treatment for gastroenteritis  may include medicines to slow or stop your diarrhea or vomiting  You may also need medicines to treat an infection caused by bacteria or a parasite  Manage your symptoms:   · Drink liquids as directed  Ask your healthcare provider how much liquid to drink each day, and which liquids are best for you  You may also need to drink an oral rehydration solution (ORS)   An ORS has the right amounts of sugar, salt, and minerals in water to replace body fluids  · Eat bland foods  When you feel hungry, begin eating soft, bland foods  Examples are bananas, clear soup, potatoes, and applesauce  Do not have dairy products, alcohol, sugary drinks, or drinks with caffeine until you feel better  · Rest as much as possible  Slowly start to do more each day when you begin to feel better  Prevent the spread of germs:  Gastroenteritis can spread easily  Keep yourself, your family, and your surroundings clean to help prevent the spread of gastroenteritis:  · Wash your hands often  Use soap and water  Wash your hands after you use the bathroom, change a child's diapers, or sneeze  Wash your hands before you prepare or eat food  · Clean surfaces and do laundry often  Wash your clothes and towels separately from the rest of the laundry  Clean surfaces in your home with antibacterial  or bleach  · Clean food thoroughly and cook safely  Wash raw vegetables before you cook  Cook meat, fish, and eggs fully  Do not use the same dishes for raw meat as you do for other foods  Refrigerate any leftover food immediately  · Be aware when you camp or travel  Drink only clean water  Do not drink from rivers or lakes unless you purify or boil the water first  When you travel, drink bottled water and do not add ice  Do not eat fruit that has not been peeled  Do not eat raw fish or meat that is not fully cooked  Follow up with your healthcare provider as directed:  Write down your questions so you remember to ask them during your visits  © 2017 [employer] [employer] Information is for End User's use only and may not be sold, redistributed or otherwise used for commercial purposes  All illustrations and images included in CareNotes® are the copyrighted property of [email or web address] , [employer]  or Greg Ayon  The above information is an  only  It is not intended as medical advice for individual conditions or treatments   Talk to your doctor, nurse or pharmacist before following any medical regimen to see if it is safe and effective for you  Follow up with PCP in 3-5 days  Proceed to  ER if symptoms worsen  Chief Complaint     Chief Complaint   Patient presents with    Diarrhea     With nausea started Friday         History of Present Illness       62-year-old female presents for evaluation of diarrhea onset 4 days ago with associated nausea, vomiting and intermittent fever  Patient reports multiple sick contacts at work  Patient has been dry crackers      She denies any new foods, meds, recent travel  No abdominal pain, chest pain, dizziness, shortness of breath, bloody stool  Review of Systems   Review of Systems   All other systems reviewed and are negative          Current Medications       Current Outpatient Medications:     butalbital-acetaminophen-caffeine (FIORICET,ESGIC) -40 mg per tablet, as needed, Disp: , Rfl:     divalproex sodium (DEPAKOTE) 500 mg EC tablet, Take by mouth, Disp: , Rfl:     estradiol (ESTRACE VAGINAL) 0 1 mg/g vaginal cream, , Disp: , Rfl:     LORazepam (ATIVAN) 0 5 mg tablet, Take 0 5 mg by mouth daily at bedtime as needed for anxiety  , Disp: , Rfl:     pantoprazole (PROTONIX) 40 mg tablet, Take 40 mg by mouth 2 (two) times a day, Disp: , Rfl:     QUEtiapine (SEROquel) 300 mg tablet, Take 300 mg by mouth, Disp: , Rfl:     albuterol (PROVENTIL HFA,VENTOLIN HFA) 90 mcg/act inhaler, Inhale 2 puffs every 4 (four) hours as needed for wheezing (Patient not taking: Reported on 2/25/2019), Disp: 1 Inhaler, Rfl: 0    LORazepam (ATIVAN) 2 mg tablet, Take by mouth, Disp: , Rfl:     methocarbamol (ROBAXIN) 750 mg tablet, Take 1 tablet (750 mg total) by mouth every 8 (eight) hours (Patient not taking: Reported on 2/25/2019), Disp: 20 tablet, Rfl: 0    naproxen (NAPROSYN) 500 mg tablet, Take 0 5 tablets (250 mg total) by mouth 2 (two) times a day with meals for 10 days, Disp: 20 tablet, Rfl: 0    omeprazole (PriLOSEC) 40 MG capsule, , Disp: , Rfl:     ondansetron (ZOFRAN) 4 mg tablet, Take 1 tablet (4 mg total) by mouth every 8 (eight) hours as needed for nausea or vomiting, Disp: 10 tablet, Rfl: 0    SYMBICORT 160-4 5 MCG/ACT inhaler, , Disp: , Rfl:     Current Allergies     Allergies as of 02/25/2019 - Reviewed 02/25/2019   Allergen Reaction Noted    Lyrica [pregabalin] Other (See Comments) 09/25/2017            The following portions of the patient's history were reviewed and updated as appropriate: allergies, current medications, past family history, past medical history, past social history, past surgical history and problem list      Past Medical History:   Diagnosis Date    [facility] [address] [facility]     Depression     GERD (gastroesophageal reflux disease)     Headache     after anesthesia    migraines     Wears glasses        Past Surgical History:   Procedure Laterality Date    CYST REMOVAL Right     hand    CYSTOSCOPY N/A 2/8/2017    Procedure: CYSTOSCOPY;  Surgeon: Sean Strong MD;  [facility];  Service:     EXAMINATION UNDER ANESTHESIA N/A 2/8/2017    Procedure: EXAM UNDER ANESTHESIA, RESECTION OF VAGINAL GRAFT;  Surgeon: Sean Strong MD;  [facility];  Service:     HYSTERECTOMY      NECK SURGERY      ND CYSTOURETHROSCOPY N/A 9/27/2017    Procedure: Crow Flash;  Surgeon: Sean Strong MD;  [facility];  Service: UroGynecology           ND REVISION VAGINAL GRAFT N/A 9/27/2017    Procedure: RESECTION OF EXPOSED MESH VAGINAL AND PARAVAGINAL TISSUE;  Surgeon: Sean Strong MD;  [facility];  Service: UroGynecology           SPINAL FUSION      C5-C6 and C6-C7    TONSILLECTOMY      TUBAL LIGATION         History reviewed  No pertinent family history  Medications have been verified          Objective   /70   Pulse 80   Temp 99 °F (37 2 °C) (Tympanic)   Resp 18   Ht 5' 2" (1 575 m)   Wt 90 7 kg (200 lb)   SpO2 97%   BMI 36 58 kg/m²        Physical Exam     Physical Exam   Constitutional: She is oriented to person, place, and time  She appears well-developed and well-nourished  No distress  HENT:   Head: Normocephalic and atraumatic  Eyes: Pupils are equal, round, and reactive to light  Conjunctivae and EOM are normal    Cardiovascular: Normal rate and regular rhythm  Exam reveals no gallop and no friction rub  No murmur heard  Pulmonary/Chest: Effort normal and breath sounds normal  No respiratory distress  She has no wheezes  She has no rales  Abdominal: Soft  Bowel sounds are normal  She exhibits no distension and no mass  There is no tenderness  There is no rebound and no guarding  Neurological: She is alert and oriented to person, place, and time  Skin: Skin is warm and dry

## 2019-05-14 ENCOUNTER — APPOINTMENT (OUTPATIENT)
Dept: LAB | Facility: CLINIC | Age: 59
End: 2019-05-14
Payer: COMMERCIAL

## 2019-05-14 ENCOUNTER — TRANSCRIBE ORDERS (OUTPATIENT)
Dept: LAB | Facility: CLINIC | Age: 59
End: 2019-05-14

## 2019-05-14 DIAGNOSIS — N39.0 URINARY TRACT INFECTION WITHOUT HEMATURIA, SITE UNSPECIFIED: ICD-10-CM

## 2019-05-14 DIAGNOSIS — R63.5 WEIGHT GAIN: ICD-10-CM

## 2019-05-14 DIAGNOSIS — R53.83 FATIGUE, UNSPECIFIED TYPE: ICD-10-CM

## 2019-05-14 DIAGNOSIS — E55.9 VITAMIN D DEFICIENCY: ICD-10-CM

## 2019-05-14 DIAGNOSIS — E78.5 HYPERLIPIDEMIA, UNSPECIFIED HYPERLIPIDEMIA TYPE: ICD-10-CM

## 2019-05-14 DIAGNOSIS — Z79.899 LONG TERM USE OF DRUG: ICD-10-CM

## 2019-05-14 DIAGNOSIS — F31.4 BIPOLAR 1 DISORDER, DEPRESSED, SEVERE (HCC): Primary | ICD-10-CM

## 2019-05-14 DIAGNOSIS — R73.9 HYPERGLYCEMIA: ICD-10-CM

## 2019-05-14 LAB
25(OH)D3 SERPL-MCNC: 16.6 NG/ML (ref 30–100)
ALBUMIN SERPL BCP-MCNC: 4.1 G/DL (ref 3.5–5)
ALP SERPL-CCNC: 79 U/L (ref 46–116)
ALT SERPL W P-5'-P-CCNC: 50 U/L (ref 12–78)
ANION GAP SERPL CALCULATED.3IONS-SCNC: 5 MMOL/L (ref 4–13)
AST SERPL W P-5'-P-CCNC: 27 U/L (ref 5–45)
BACTERIA UR QL AUTO: ABNORMAL /HPF
BASOPHILS # BLD AUTO: 0.05 THOUSANDS/ΜL (ref 0–0.1)
BASOPHILS NFR BLD AUTO: 1 % (ref 0–1)
BILIRUB SERPL-MCNC: 0.44 MG/DL (ref 0.2–1)
BILIRUB UR QL STRIP: NEGATIVE
BUN SERPL-MCNC: 9 MG/DL (ref 5–25)
CALCIUM SERPL-MCNC: 9.1 MG/DL (ref 8.3–10.1)
CHLORIDE SERPL-SCNC: 105 MMOL/L (ref 100–108)
CHOLEST SERPL-MCNC: 200 MG/DL (ref 50–200)
CLARITY UR: CLEAR
CO2 SERPL-SCNC: 29 MMOL/L (ref 21–32)
COLOR UR: YELLOW
CREAT SERPL-MCNC: 0.95 MG/DL (ref 0.6–1.3)
EOSINOPHIL # BLD AUTO: 0.3 THOUSAND/ΜL (ref 0–0.61)
EOSINOPHIL NFR BLD AUTO: 4 % (ref 0–6)
ERYTHROCYTE [DISTWIDTH] IN BLOOD BY AUTOMATED COUNT: 14.2 % (ref 11.6–15.1)
EST. AVERAGE GLUCOSE BLD GHB EST-MCNC: 123 MG/DL
GFR SERPL CREATININE-BSD FRML MDRD: 67 ML/MIN/1.73SQ M
GLUCOSE P FAST SERPL-MCNC: 119 MG/DL (ref 65–99)
GLUCOSE UR STRIP-MCNC: NEGATIVE MG/DL
HBA1C MFR BLD: 5.9 % (ref 4.2–6.3)
HCT VFR BLD AUTO: 45.8 % (ref 34.8–46.1)
HDLC SERPL-MCNC: 49 MG/DL (ref 40–60)
HGB BLD-MCNC: 15.1 G/DL (ref 11.5–15.4)
HGB UR QL STRIP.AUTO: NEGATIVE
HYALINE CASTS #/AREA URNS LPF: ABNORMAL /LPF
IMM GRANULOCYTES # BLD AUTO: 0.02 THOUSAND/UL (ref 0–0.2)
IMM GRANULOCYTES NFR BLD AUTO: 0 % (ref 0–2)
KETONES UR STRIP-MCNC: NEGATIVE MG/DL
LDLC SERPL CALC-MCNC: 130 MG/DL (ref 0–100)
LEUKOCYTE ESTERASE UR QL STRIP: ABNORMAL
LYMPHOCYTES # BLD AUTO: 2.51 THOUSANDS/ΜL (ref 0.6–4.47)
LYMPHOCYTES NFR BLD AUTO: 33 % (ref 14–44)
MCH RBC QN AUTO: 32.3 PG (ref 26.8–34.3)
MCHC RBC AUTO-ENTMCNC: 33 G/DL (ref 31.4–37.4)
MCV RBC AUTO: 98 FL (ref 82–98)
MONOCYTES # BLD AUTO: 0.45 THOUSAND/ΜL (ref 0.17–1.22)
MONOCYTES NFR BLD AUTO: 6 % (ref 4–12)
NEUTROPHILS # BLD AUTO: 4.26 THOUSANDS/ΜL (ref 1.85–7.62)
NEUTS SEG NFR BLD AUTO: 56 % (ref 43–75)
NITRITE UR QL STRIP: NEGATIVE
NON-SQ EPI CELLS URNS QL MICRO: ABNORMAL /HPF
NONHDLC SERPL-MCNC: 151 MG/DL
NRBC BLD AUTO-RTO: 0 /100 WBCS
PH UR STRIP.AUTO: 7 [PH]
PLATELET # BLD AUTO: 190 THOUSANDS/UL (ref 149–390)
PMV BLD AUTO: 11.9 FL (ref 8.9–12.7)
POTASSIUM SERPL-SCNC: 4.3 MMOL/L (ref 3.5–5.3)
PROT SERPL-MCNC: 7.9 G/DL (ref 6.4–8.2)
PROT UR STRIP-MCNC: NEGATIVE MG/DL
RBC # BLD AUTO: 4.68 MILLION/UL (ref 3.81–5.12)
RBC #/AREA URNS AUTO: ABNORMAL /HPF
SODIUM SERPL-SCNC: 139 MMOL/L (ref 136–145)
SP GR UR STRIP.AUTO: 1 (ref 1–1.03)
T4 FREE SERPL-MCNC: 1.16 NG/DL (ref 0.76–1.46)
TRIGL SERPL-MCNC: 103 MG/DL
TSH SERPL DL<=0.05 MIU/L-ACNC: 1.81 UIU/ML (ref 0.36–3.74)
UROBILINOGEN UR QL STRIP.AUTO: 0.2 E.U./DL
WBC # BLD AUTO: 7.59 THOUSAND/UL (ref 4.31–10.16)
WBC #/AREA URNS AUTO: ABNORMAL /HPF

## 2019-05-14 PROCEDURE — 82306 VITAMIN D 25 HYDROXY: CPT

## 2019-05-14 PROCEDURE — 80053 COMPREHEN METABOLIC PANEL: CPT

## 2019-05-14 PROCEDURE — 83036 HEMOGLOBIN GLYCOSYLATED A1C: CPT

## 2019-05-14 PROCEDURE — 80061 LIPID PANEL: CPT

## 2019-05-14 PROCEDURE — 84439 ASSAY OF FREE THYROXINE: CPT

## 2019-05-14 PROCEDURE — 84443 ASSAY THYROID STIM HORMONE: CPT

## 2019-05-14 PROCEDURE — 81001 URINALYSIS AUTO W/SCOPE: CPT

## 2019-05-14 PROCEDURE — 87086 URINE CULTURE/COLONY COUNT: CPT

## 2019-05-14 PROCEDURE — 85025 COMPLETE CBC W/AUTO DIFF WBC: CPT

## 2019-05-14 PROCEDURE — 80165 DIPROPYLACETIC ACID FREE: CPT

## 2019-05-14 PROCEDURE — 36415 COLL VENOUS BLD VENIPUNCTURE: CPT

## 2019-05-15 LAB — BACTERIA UR CULT: NORMAL

## 2019-05-16 LAB — VALPROATE FREE SERPL-MCNC: 10.3 UG/ML (ref 6–22)

## 2019-10-30 ENCOUNTER — APPOINTMENT (OUTPATIENT)
Dept: LAB | Facility: HOSPITAL | Age: 59
End: 2019-10-30
Attending: INTERNAL MEDICINE
Payer: COMMERCIAL

## 2019-10-30 ENCOUNTER — TRANSCRIBE ORDERS (OUTPATIENT)
Dept: LAB | Facility: HOSPITAL | Age: 59
End: 2019-10-30

## 2019-10-30 DIAGNOSIS — Z79.899 ENCOUNTER FOR MONITORING STATIN THERAPY: ICD-10-CM

## 2019-10-30 DIAGNOSIS — E11.9 DIABETES MELLITUS WITH NO COMPLICATION (HCC): ICD-10-CM

## 2019-10-30 DIAGNOSIS — E55.9 VITAMIN D DEFICIENCY: ICD-10-CM

## 2019-10-30 DIAGNOSIS — Z79.899 ENCOUNTER FOR LONG-TERM (CURRENT) USE OF OTHER MEDICATIONS: ICD-10-CM

## 2019-10-30 DIAGNOSIS — Z51.81 ENCOUNTER FOR MONITORING STATIN THERAPY: ICD-10-CM

## 2019-10-30 DIAGNOSIS — R73.9 HYPERGLYCEMIA: ICD-10-CM

## 2019-10-30 DIAGNOSIS — E78.5 HYPERLIPIDEMIA, UNSPECIFIED HYPERLIPIDEMIA TYPE: ICD-10-CM

## 2019-10-30 DIAGNOSIS — E11.9 DIABETES MELLITUS WITH NO COMPLICATION (HCC): Primary | ICD-10-CM

## 2019-10-30 LAB
25(OH)D3 SERPL-MCNC: 36.7 NG/ML (ref 30–100)
ALBUMIN SERPL BCP-MCNC: 4.6 G/DL (ref 3.5–5.7)
ALP SERPL-CCNC: 57 U/L (ref 40–150)
ALT SERPL W P-5'-P-CCNC: 13 U/L (ref 7–52)
ANION GAP SERPL CALCULATED.3IONS-SCNC: 5 MMOL/L (ref 4–13)
AST SERPL W P-5'-P-CCNC: 18 U/L (ref 13–39)
BILIRUB SERPL-MCNC: 0.4 MG/DL (ref 0.2–1)
BUN SERPL-MCNC: 13 MG/DL (ref 7–25)
CALCIUM SERPL-MCNC: 9.8 MG/DL (ref 8.6–10.5)
CHLORIDE SERPL-SCNC: 103 MMOL/L (ref 98–107)
CHOLEST SERPL-MCNC: 183 MG/DL (ref 0–200)
CO2 SERPL-SCNC: 32 MMOL/L (ref 21–31)
CREAT SERPL-MCNC: 0.77 MG/DL (ref 0.6–1.2)
EST. AVERAGE GLUCOSE BLD GHB EST-MCNC: 105 MG/DL
GFR SERPL CREATININE-BSD FRML MDRD: 86 ML/MIN/1.73SQ M
GLUCOSE P FAST SERPL-MCNC: 98 MG/DL (ref 65–99)
HBA1C MFR BLD: 5.3 % (ref 4.2–6.3)
HDLC SERPL-MCNC: 54 MG/DL
LDLC SERPL CALC-MCNC: 115 MG/DL (ref 0–100)
NONHDLC SERPL-MCNC: 129 MG/DL
POTASSIUM SERPL-SCNC: 4.7 MMOL/L (ref 3.5–5.5)
PROT SERPL-MCNC: 7.6 G/DL (ref 6.4–8.9)
SODIUM SERPL-SCNC: 140 MMOL/L (ref 134–143)
TRIGL SERPL-MCNC: 68 MG/DL (ref 44–166)

## 2019-10-30 PROCEDURE — 80061 LIPID PANEL: CPT

## 2019-10-30 PROCEDURE — 82306 VITAMIN D 25 HYDROXY: CPT

## 2019-10-30 PROCEDURE — 36415 COLL VENOUS BLD VENIPUNCTURE: CPT

## 2019-10-30 PROCEDURE — 83036 HEMOGLOBIN GLYCOSYLATED A1C: CPT

## 2019-10-30 PROCEDURE — 80053 COMPREHEN METABOLIC PANEL: CPT

## 2020-02-17 ENCOUNTER — APPOINTMENT (EMERGENCY)
Dept: CT IMAGING | Facility: HOSPITAL | Age: 60
End: 2020-02-17
Payer: COMMERCIAL

## 2020-02-17 ENCOUNTER — HOSPITAL ENCOUNTER (EMERGENCY)
Facility: HOSPITAL | Age: 60
Discharge: HOME | End: 2020-02-17
Attending: EMERGENCY MEDICINE | Admitting: EMERGENCY MEDICINE
Payer: COMMERCIAL

## 2020-02-17 ENCOUNTER — OFFICE VISIT (OUTPATIENT)
Dept: URGENT CARE | Facility: CLINIC | Age: 60
End: 2020-02-17
Payer: COMMERCIAL

## 2020-02-17 VITALS
SYSTOLIC BLOOD PRESSURE: 125 MMHG | DIASTOLIC BLOOD PRESSURE: 57 MMHG | RESPIRATION RATE: 18 BRPM | BODY MASS INDEX: 30.91 KG/M2 | HEIGHT: 62 IN | HEART RATE: 79 BPM | OXYGEN SATURATION: 100 % | TEMPERATURE: 97 F | WEIGHT: 168 LBS

## 2020-02-17 VITALS
HEART RATE: 71 BPM | RESPIRATION RATE: 16 BRPM | DIASTOLIC BLOOD PRESSURE: 66 MMHG | WEIGHT: 164 LBS | TEMPERATURE: 98.8 F | BODY MASS INDEX: 30.18 KG/M2 | OXYGEN SATURATION: 95 % | HEIGHT: 62 IN | SYSTOLIC BLOOD PRESSURE: 123 MMHG

## 2020-02-17 DIAGNOSIS — N39.0 UTI (URINARY TRACT INFECTION): ICD-10-CM

## 2020-02-17 DIAGNOSIS — R10.9 RIGHT FLANK PAIN: Primary | ICD-10-CM

## 2020-02-17 DIAGNOSIS — R10.9 FLANK PAIN: Primary | ICD-10-CM

## 2020-02-17 LAB
ALBUMIN SERPL BCP-MCNC: 4.5 G/DL (ref 3.5–5.7)
ALP SERPL-CCNC: 52 U/L (ref 40–150)
ALT SERPL W P-5'-P-CCNC: 11 U/L (ref 7–52)
ANION GAP SERPL CALCULATED.3IONS-SCNC: 7 MMOL/L (ref 4–13)
AST SERPL W P-5'-P-CCNC: 16 U/L (ref 13–39)
BACTERIA UR QL AUTO: ABNORMAL /HPF
BASOPHILS # BLD AUTO: 0 THOUSANDS/ΜL (ref 0–0.1)
BASOPHILS NFR BLD AUTO: 0 % (ref 0–2)
BILIRUB SERPL-MCNC: 0.4 MG/DL (ref 0.2–1)
BILIRUB UR QL STRIP: NEGATIVE
BUN SERPL-MCNC: 14 MG/DL (ref 7–25)
CALCIUM SERPL-MCNC: 9.8 MG/DL (ref 8.6–10.5)
CHLORIDE SERPL-SCNC: 99 MMOL/L (ref 98–107)
CLARITY UR: ABNORMAL
CO2 SERPL-SCNC: 31 MMOL/L (ref 21–31)
COLOR UR: YELLOW
CREAT SERPL-MCNC: 0.69 MG/DL (ref 0.6–1.2)
EOSINOPHIL # BLD AUTO: 0.3 THOUSAND/ΜL (ref 0–0.61)
EOSINOPHIL NFR BLD AUTO: 5 % (ref 0–5)
ERYTHROCYTE [DISTWIDTH] IN BLOOD BY AUTOMATED COUNT: 14.8 % (ref 11.5–14.5)
GFR SERPL CREATININE-BSD FRML MDRD: 97 ML/MIN/1.73SQ M
GLUCOSE SERPL-MCNC: 87 MG/DL (ref 65–99)
GLUCOSE UR STRIP-MCNC: NEGATIVE MG/DL
HCT VFR BLD AUTO: 43.8 % (ref 42–47)
HGB BLD-MCNC: 14.5 G/DL (ref 12–16)
HGB UR QL STRIP.AUTO: ABNORMAL
KETONES UR STRIP-MCNC: NEGATIVE MG/DL
LEUKOCYTE ESTERASE UR QL STRIP: ABNORMAL
LYMPHOCYTES # BLD AUTO: 3 THOUSANDS/ΜL (ref 0.6–4.47)
LYMPHOCYTES NFR BLD AUTO: 45 % (ref 21–51)
MCH RBC QN AUTO: 31.1 PG (ref 26–34)
MCHC RBC AUTO-ENTMCNC: 33.1 G/DL (ref 31–37)
MCV RBC AUTO: 94 FL (ref 81–99)
MONOCYTES # BLD AUTO: 0.6 THOUSAND/ΜL (ref 0.17–1.22)
MONOCYTES NFR BLD AUTO: 9 % (ref 2–12)
NEUTROPHILS # BLD AUTO: 2.8 THOUSANDS/ΜL (ref 1.4–6.5)
NEUTS SEG NFR BLD AUTO: 41 % (ref 42–75)
NITRITE UR QL STRIP: NEGATIVE
NON-SQ EPI CELLS URNS QL MICRO: ABNORMAL /HPF
PH UR STRIP.AUTO: 6 [PH]
PLATELET # BLD AUTO: 195 THOUSANDS/UL (ref 149–390)
PMV BLD AUTO: 8.8 FL (ref 8.6–11.7)
POTASSIUM SERPL-SCNC: 3.9 MMOL/L (ref 3.5–5.5)
PROT SERPL-MCNC: 7.4 G/DL (ref 6.4–8.9)
PROT UR STRIP-MCNC: NEGATIVE MG/DL
RBC # BLD AUTO: 4.65 MILLION/UL (ref 3.9–5.2)
RBC #/AREA URNS AUTO: ABNORMAL /HPF
SL AMB  POCT GLUCOSE, UA: ABNORMAL
SL AMB LEUKOCYTE ESTERASE,UA: ABNORMAL
SL AMB POCT BILIRUBIN,UA: ABNORMAL
SL AMB POCT BLOOD,UA: ABNORMAL
SL AMB POCT CLARITY,UA: ABNORMAL
SL AMB POCT COLOR,UA: YELLOW
SL AMB POCT KETONES,UA: ABNORMAL
SL AMB POCT NITRITE,UA: ABNORMAL
SL AMB POCT PH,UA: 7
SL AMB POCT SPECIFIC GRAVITY,UA: 1.01
SL AMB POCT URINE PROTEIN: ABNORMAL
SL AMB POCT UROBILINOGEN: 0.2
SODIUM SERPL-SCNC: 137 MMOL/L (ref 134–143)
SP GR UR STRIP.AUTO: <=1.005 (ref 1–1.03)
UROBILINOGEN UR QL STRIP.AUTO: 0.2 E.U./DL
WBC # BLD AUTO: 6.7 THOUSAND/UL (ref 4.8–10.8)
WBC #/AREA URNS AUTO: ABNORMAL /HPF

## 2020-02-17 PROCEDURE — 74176 CT ABD & PELVIS W/O CONTRAST: CPT

## 2020-02-17 PROCEDURE — 80053 COMPREHEN METABOLIC PANEL: CPT | Performed by: EMERGENCY MEDICINE

## 2020-02-17 PROCEDURE — 36415 COLL VENOUS BLD VENIPUNCTURE: CPT | Performed by: EMERGENCY MEDICINE

## 2020-02-17 PROCEDURE — 99284 EMERGENCY DEPT VISIT MOD MDM: CPT | Performed by: EMERGENCY MEDICINE

## 2020-02-17 PROCEDURE — 81003 URINALYSIS AUTO W/O SCOPE: CPT | Performed by: EMERGENCY MEDICINE

## 2020-02-17 PROCEDURE — 85025 COMPLETE CBC W/AUTO DIFF WBC: CPT | Performed by: EMERGENCY MEDICINE

## 2020-02-17 PROCEDURE — 96372 THER/PROPH/DIAG INJ SC/IM: CPT

## 2020-02-17 PROCEDURE — 99284 EMERGENCY DEPT VISIT MOD MDM: CPT

## 2020-02-17 PROCEDURE — 81001 URINALYSIS AUTO W/SCOPE: CPT | Performed by: EMERGENCY MEDICINE

## 2020-02-17 PROCEDURE — 99213 OFFICE O/P EST LOW 20 MIN: CPT | Performed by: PHYSICIAN ASSISTANT

## 2020-02-17 PROCEDURE — 87086 URINE CULTURE/COLONY COUNT: CPT | Performed by: EMERGENCY MEDICINE

## 2020-02-17 RX ORDER — DIVALPROEX SODIUM 500 MG/1
1 TABLET, EXTENDED RELEASE ORAL 2 TIMES DAILY
COMMUNITY
Start: 2019-12-17 | End: 2020-09-21 | Stop reason: SDUPTHER

## 2020-02-17 RX ORDER — SULFAMETHOXAZOLE AND TRIMETHOPRIM 800; 160 MG/1; MG/1
1 TABLET ORAL 2 TIMES DAILY
Qty: 14 TABLET | Refills: 0 | Status: SHIPPED | OUTPATIENT
Start: 2020-02-17 | End: 2020-02-24

## 2020-02-17 RX ORDER — KETOROLAC TROMETHAMINE 30 MG/ML
60 INJECTION, SOLUTION INTRAMUSCULAR; INTRAVENOUS ONCE
Status: COMPLETED | OUTPATIENT
Start: 2020-02-17 | End: 2020-02-17

## 2020-02-17 RX ADMIN — KETOROLAC TROMETHAMINE 60 MG: 30 INJECTION, SOLUTION INTRAMUSCULAR; INTRAVENOUS at 18:48

## 2020-02-17 NOTE — ED PROVIDER NOTES
History  Chief Complaint   Patient presents with    Flank Pain     right sided; started 3 weeks ago and has been intermittent; today pain presented and will not go away; pt presented to care now in [facility] and was referred here for further evaluation     Right sided flank pain with slight rad into the from of the abd  Sx for several mths, worse today  No fever  No uti sx  deneis trauma  Does not feel ill  No n/v/d  Prior to Admission Medications   Prescriptions Last Dose Informant Patient Reported? Taking?    LORazepam (ATIVAN) 0 5 mg tablet  Self Yes No   Sig: Take 0 5 mg by mouth daily at bedtime as needed for anxiety     LORazepam (ATIVAN) 2 mg tablet  Self Yes No   Sig: Take by mouth   QUEtiapine (SEROquel) 300 mg tablet  Self Yes No   Sig: Take 300 mg by mouth   SYMBICORT 160-4 5 MCG/ACT inhaler  Self Yes No   albuterol (PROVENTIL HFA,VENTOLIN HFA) 90 mcg/act inhaler Not Taking at Unknown time Self No No   Sig: Inhale 2 puffs every 4 (four) hours as needed for wheezing   Patient not taking: Reported on 2/25/2019   butalbital-acetaminophen-caffeine (FIORICET,ESGIC) -40 mg per tablet  Self Yes No   Sig: as needed   divalproex sodium (DEPAKOTE ER) 500 mg 24 hr tablet   Yes No   Sig: Take 1 tablet by mouth 2 (two) times a day   divalproex sodium (DEPAKOTE) 500 mg EC tablet  Self Yes No   Sig: Take by mouth   estradiol (ESTRACE VAGINAL) 0 1 mg/g vaginal cream  Self Yes No   methocarbamol (ROBAXIN) 750 mg tablet Not Taking at Unknown time Self No No   Sig: Take 1 tablet (750 mg total) by mouth every 8 (eight) hours   Patient not taking: Reported on 2/25/2019   naproxen (NAPROSYN) 500 mg tablet   No No   Sig: Take 0 5 tablets (250 mg total) by mouth 2 (two) times a day with meals for 10 days   Patient not taking: Reported on 12/9/2019   omeprazole (PriLOSEC) 40 MG capsule  Self Yes No   ondansetron (ZOFRAN) 4 mg tablet  Self No No   Sig: Take 1 tablet (4 mg total) by mouth every 8 (eight) hours as needed for nausea or vomiting   Patient not taking: Reported on 12/9/2019   pantoprazole (PROTONIX) 40 mg tablet  Self Yes No   Sig: Take 40 mg by mouth 2 (two) times a day      Facility-Administered Medications: None       Past Medical History:   Diagnosis Date    Bipolar disorder ([address] )     Depression     GERD (gastroesophageal reflux disease)     Headache     after anesthesia    Hyperlipidemia     migraines     Wears glasses        Past Surgical History:   Procedure Laterality Date    CYST REMOVAL Right     hand    CYSTOSCOPY N/A 2/8/2017    Procedure: CYSTOSCOPY;  Surgeon: Johnny Mayen MD;  [facility];  Service:     EXAMINATION UNDER ANESTHESIA N/A 2/8/2017    Procedure: EXAM UNDER ANESTHESIA, RESECTION OF VAGINAL GRAFT;  Surgeon: Johnny Mayen MD;  [facility];  Service:     HYSTERECTOMY      MI CYSTOURETHROSCOPY N/A 9/27/2017    Procedure: Roy Salk;  Surgeon: Johnny Mayen MD;  [facility];  Service: UroGynecology           MI REVISION VAGINAL GRAFT N/A 9/27/2017    Procedure: RESECTION OF EXPOSED MESH VAGINAL AND PARAVAGINAL TISSUE;  Surgeon: Johnny Mayen MD;  [facility];  Service: UroGynecology           SPINAL FUSION      C5-C6 and C6-C7    TONSILLECTOMY      TUBAL LIGATION         History reviewed  No pertinent family history  I have reviewed and agree with the history as documented  Social History     Tobacco Use    Smoking status: Current Every Day Smoker     Packs/day: 0 50     Years: 20 00     Pack years: 10 00     Types: Cigarettes    Smokeless tobacco: Never Used    Tobacco comment: 1 pack a week   Substance Use Topics    Alcohol use: Yes     Comment: socially    Drug use: No       Review of Systems   All other systems reviewed and are negative  Physical Exam  Physical Exam   Constitutional: She is oriented to person, place, and time  She appears well-developed and well-nourished  HENT:   Head: Normocephalic and atraumatic  Right Ear: External ear normal    Left Ear: External ear normal    Eyes: Pupils are equal, round, and reactive to light  Right eye exhibits no discharge  Left eye exhibits no discharge  Neck: Normal range of motion  Neck supple  No JVD present  Cardiovascular: Normal rate, regular rhythm and normal heart sounds  Exam reveals no gallop and no friction rub  No murmur heard  Pulmonary/Chest: Effort normal and breath sounds normal  No stridor  No respiratory distress  She has no wheezes  She has no rales  She exhibits no tenderness  Abdominal: She exhibits no distension and no mass  There is no tenderness  There is no rebound and no guarding  No hernia  Right sided cva tenderness  Musculoskeletal: Normal range of motion  She exhibits no edema, tenderness or deformity  Neurological: She is alert and oriented to person, place, and time  She displays normal reflexes  No cranial nerve deficit or sensory deficit  She exhibits normal muscle tone  Coordination normal    Skin: Skin is warm  Capillary refill takes less than 2 seconds  No rash noted  No erythema  Psychiatric: She has a normal mood and affect         Vital Signs  ED Triage Vitals [02/17/20 1637]   Temperature Pulse Respirations Blood Pressure SpO2   98 8 °F (37 1 °C) 68 16 130/60 99 %      Temp Source Heart Rate Source Patient Position - Orthostatic VS BP Location FiO2 (%)   Temporal Monitor Sitting Left arm --      Pain Score       8           Vitals:    02/17/20 1637 02/17/20 2015   BP: 130/60 123/66   Pulse: 68 71   Patient Position - Orthostatic VS: Sitting Sitting         Visual Acuity      ED Medications  Medications   ketorolac (TORADOL) injection 60 mg (60 mg Intramuscular Given 2/17/20 1848)       Diagnostic Studies  Results Reviewed     Procedure Component Value Units Date/Time    Comprehensive metabolic panel [[id number]] Collected:  02/17/20 1819    Lab Status:  Final result Specimen:  Blood from Arm, Right Updated:  02/17/20 0090 Sodium 137 mmol/L      Potassium 3 9 mmol/L      Chloride 99 mmol/L      CO2 31 mmol/L      ANION GAP 7 mmol/L      BUN 14 mg/dL      Creatinine 0 69 mg/dL      Glucose 87 mg/dL      Calcium 9 8 mg/dL      AST 16 U/L      ALT 11 U/L      Alkaline Phosphatase 52 U/L      Total Protein 7 4 g/dL      Albumin 4 5 g/dL      Total Bilirubin 0 40 mg/dL      eGFR 97 ml/min/1 73sq m     Narrative:       National Kidney Disease Foundation guidelines for Chronic Kidney Disease (CKD):     Stage 1 with normal or high GFR (GFR > 90 mL/min/1 73 square meters)    Stage 2 Mild CKD (GFR = 60-89 mL/min/1 73 square meters)    Stage 3A Moderate CKD (GFR = 45-59 mL/min/1 73 square meters)    Stage 3B Moderate CKD (GFR = 30-44 mL/min/1 73 square meters)    Stage 4 Severe CKD (GFR = 15-29 mL/min/1 73 square meters)    Stage 5 End Stage CKD (GFR <15 mL/min/1 73 square meters)  Note: GFR calculation is accurate only with a steady state creatinine    CBC and differential [[id number]]  (Abnormal) Collected:  02/17/20 1819    Lab Status:  Final result Specimen:  Blood from Arm, Right Updated:  02/17/20 1830     WBC 6 70 Thousand/uL      RBC 4 65 Million/uL      Hemoglobin 14 5 g/dL      Hematocrit 43 8 %      MCV 94 fL      MCH 31 1 pg      MCHC 33 1 g/dL      RDW 14 8 %      MPV 8 8 fL      Platelets 245 Thousands/uL      Neutrophils Relative 41 %      Lymphocytes Relative 45 %      Monocytes Relative 9 %      Eosinophils Relative 5 %      Basophils Relative 0 %      Neutrophils Absolute 2 80 Thousands/µL      Lymphocytes Absolute 3 00 Thousands/µL      Monocytes Absolute 0 60 Thousand/µL      Eosinophils Absolute 0 30 Thousand/µL      Basophils Absolute 0 00 Thousands/µL     Urine Microscopic [[id number]]  (Abnormal) Collected:  02/17/20 1659    Lab Status:  Final result Specimen:  Urine, Clean Catch Updated:  02/17/20 1723     RBC, UA 1-2 /hpf      WBC, UA 10-20 /hpf      Epithelial Cells Occasional /hpf      Bacteria, UA Occasional /hpf Urine culture [[id number]] Collected:  02/17/20 1659    Lab Status: In process Specimen:  Urine, Clean Catch Updated:  02/17/20 1723    UA w Reflex to Microscopic w Reflex to Culture [[id number]]  (Abnormal) Collected:  02/17/20 1659    Lab Status:  Final result Specimen:  Urine, Clean Catch Updated:  02/17/20 1710     Color, UA Yellow     Clarity, UA Slightly Cloudy     Specific Gravity, UA <=1 005     pH, UA 6 0     Leukocytes, UA 3+     Nitrite, UA Negative     Protein, UA Negative mg/dl      Glucose, UA Negative mg/dl      Ketones, UA Negative mg/dl      Urobilinogen, UA 0 2 E U /dl      Bilirubin, UA Negative     Blood, UA 1+                 CT abdomen pelvis wo contrast   Final Result by Clovis Ceballos MD (02/17 1932)      No acute findings in the abdomen or pelvis  Workstation performed: [id number]                    Procedures  Procedures         ED Course                               MDM  Number of Diagnoses or Management Options  Diagnosis management comments: Flank pain for weeks, worse today  Isolated right cva tenderness on exam, otherwise benign  I will start abx for uti  Red flags discussed in detail and she voiced understanding to return instructions and need for fu  Disposition  Final diagnoses:   Flank pain   UTI (urinary tract infection)     Time reflects when diagnosis was documented in both MDM as applicable and the Disposition within this note     Time User Action Codes Description Comment    2/17/2020  7:57 PM Pau Marin Add [R10 9] Flank pain     2/17/2020  7:57 PM Pau Marin Add [N39 0] UTI (urinary tract infection)       ED Disposition     ED Disposition Condition Date/Time Comment    Discharge Stable Mon Feb 17, 2020  9:17 PM Arnel Mina discharge to home/self care              Follow-up Information     Follow up With Specialties Details Why Contact Info    Jose Daniel Ho MD Emergency Medicine, Internal Medicine Schedule an appointment as soon as possible for a visit in 2 days  [address] [place] [address] [facility]  [phone]            Discharge Medication List as of 2/17/2020  7:58 PM      START taking these medications    Details   sulfamethoxazole-trimethoprim (BACTRIM DS) 800-160 mg per tablet Take 1 tablet by mouth 2 (two) times a day for 7 days smx-tmp DS (BACTRIM) 800-160 mg tabs (1tab q12 D10), Starting Mon 2/17/2020, Until Mon 2/24/2020, Normal         CONTINUE these medications which have NOT CHANGED    Details   albuterol (PROVENTIL HFA,VENTOLIN HFA) 90 mcg/act inhaler Inhale 2 puffs every 4 (four) hours as needed for wheezing, Starting Sat 5/5/2018, Normal      butalbital-acetaminophen-caffeine (FIORICET,ESGIC) -40 mg per tablet as needed, Historical Med      divalproex sodium (DEPAKOTE ER) 500 mg 24 hr tablet Take 1 tablet by mouth 2 (two) times a day, Starting Tue 12/17/2019, Historical Med      divalproex sodium (DEPAKOTE) 500 mg EC tablet Take by mouth, Historical Med      estradiol (ESTRACE VAGINAL) 0 1 mg/g vaginal cream Starting Tue 12/16/2014, Historical Med      !! LORazepam (ATIVAN) 0 5 mg tablet Take 0 5 mg by mouth daily at bedtime as needed for anxiety  , Historical Med      !!  LORazepam (ATIVAN) 2 mg tablet Take by mouth, Historical Med      methocarbamol (ROBAXIN) 750 mg tablet Take 1 tablet (750 mg total) by mouth every 8 (eight) hours, Starting Wed 1/9/2019, Normal      naproxen (NAPROSYN) 500 mg tablet Take 0 5 tablets (250 mg total) by mouth 2 (two) times a day with meals for 10 days, Starting Wed 1/9/2019, Until Sat 1/19/2019, Normal      omeprazole (PriLOSEC) 40 MG capsule Starting Sun 12/7/2014, Historical Med      ondansetron (ZOFRAN) 4 mg tablet Take 1 tablet (4 mg total) by mouth every 8 (eight) hours as needed for nausea or vomiting, Starting Mon 2/25/2019, Normal      pantoprazole (PROTONIX) 40 mg tablet Take 40 mg by mouth 2 (two) times a day, Historical Med      QUEtiapine (SEROquel) 300 mg tablet Take 300 mg by mouth, Starting Tue 6/7/2011, Historical Med      SYMBICORT 160-4 5 MCG/ACT inhaler Starting Wed 2/14/2018, Historical Med       !! - Potential duplicate medications found  Please discuss with provider  No discharge procedures on file      PDMP Review     None          ED Provider  Electronically Signed by           Lee Allen MD  02/18/20 9683

## 2020-02-17 NOTE — PROGRESS NOTES
[address]        NAME: Erick Beltrán is a 64 y o  female  : 1960    MRN: [id number]  DATE: 2020  TIME: 3:29 PM    Assessment and Plan   Right flank pain [R10 9]  1  Right flank pain  POCT urine dip auto non-scope    Transfer to other facility         Patient Instructions     Go directly to the emergency room for further evaluation  Chief Complaint     Chief Complaint   Patient presents with    Back Pain     right sided low back pain began 3 wks ago  Per pt "my dr thought it was my gallbladder and he gave me a prescription for an ultrasound but I haven't had a chance to get it done yet " Pain worse after eating  History of Present Illness       Patient presents with 3 week history of right flank pain  She see her family doctor would ultrasound of her gallbladder which she has not had this completed as of today  Patient denies any nausea or vomiting, difficulty with urination  Review of Systems   Review of Systems   Constitutional: Negative for chills and fever  Gastrointestinal: Negative for abdominal pain, nausea and vomiting  Genitourinary: Positive for flank pain  Negative for difficulty urinating, dysuria, hematuria and urgency  Musculoskeletal: Positive for back pain  Skin: Negative for rash  Hematological: Negative for adenopathy           Current Medications       Current Outpatient Medications:     butalbital-acetaminophen-caffeine (FIORICET,ESGIC) -40 mg per tablet, as needed, Disp: , Rfl:     divalproex sodium (DEPAKOTE) 500 mg EC tablet, Take by mouth, Disp: , Rfl:     LORazepam (ATIVAN) 0 5 mg tablet, Take 0 5 mg by mouth daily at bedtime as needed for anxiety  , Disp: , Rfl:     LORazepam (ATIVAN) 2 mg tablet, Take by mouth, Disp: , Rfl:     QUEtiapine (SEROquel) 300 mg tablet, Take 300 mg by mouth, Disp: , Rfl:     albuterol (PROVENTIL HFA,VENTOLIN HFA) 90 mcg/act inhaler, Inhale 2 puffs every 4 (four) hours as needed for wheezing (Patient not taking: Reported on 2/25/2019), Disp: 1 Inhaler, Rfl: 0    estradiol (ESTRACE VAGINAL) 0 1 mg/g vaginal cream, , Disp: , Rfl:     methocarbamol (ROBAXIN) 750 mg tablet, Take 1 tablet (750 mg total) by mouth every 8 (eight) hours (Patient not taking: Reported on 2/25/2019), Disp: 20 tablet, Rfl: 0    naproxen (NAPROSYN) 500 mg tablet, Take 0 5 tablets (250 mg total) by mouth 2 (two) times a day with meals for 10 days (Patient not taking: Reported on 12/9/2019), Disp: 20 tablet, Rfl: 0    omeprazole (PriLOSEC) 40 MG capsule, , Disp: , Rfl:     ondansetron (ZOFRAN) 4 mg tablet, Take 1 tablet (4 mg total) by mouth every 8 (eight) hours as needed for nausea or vomiting (Patient not taking: Reported on 12/9/2019), Disp: 10 tablet, Rfl: 0    pantoprazole (PROTONIX) 40 mg tablet, Take 40 mg by mouth 2 (two) times a day, Disp: , Rfl:     SYMBICORT 160-4 5 MCG/ACT inhaler, , Disp: , Rfl:     Current Allergies     Allergies as of 02/17/2020 - Reviewed 02/17/2020   Allergen Reaction Noted    Lyrica [pregabalin] Other (See Comments) 09/25/2017            The following portions of the patient's history were reviewed and updated as appropriate: allergies, current medications, past family history, past medical history, past social history, past surgical history and problem list      Past Medical History:   Diagnosis Date    Bipolar disorder ([address] )     Depression     GERD (gastroesophageal reflux disease)     Headache     after anesthesia    Hyperlipidemia     migraines     Wears glasses        Past Surgical History:   Procedure Laterality Date    CYST REMOVAL Right     hand    CYSTOSCOPY N/A 2/8/2017    Procedure: CYSTOSCOPY;  Surgeon: Juan Velasquez MD;  [facility];  Service:     EXAMINATION UNDER ANESTHESIA N/A 2/8/2017    Procedure: EXAM UNDER ANESTHESIA, RESECTION OF VAGINAL GRAFT;  Surgeon: Juan Velasquez MD;  [facility];  Service:     HYSTERECTOMY      NV CYSTOURETHROSCOPY N/A 9/27/2017    Procedure: CYSTOSCOPY;  Surgeon: Ryne Leyva MD;  [facility];  Service: UroGynecology           [place] REVISION VAGINAL GRAFT N/A 9/27/2017    Procedure: RESECTION OF EXPOSED MESH VAGINAL AND PARAVAGINAL TISSUE;  Surgeon: Ryne Leyva MD;  [facility];  Service: UroGynecology           SPINAL FUSION      C5-C6 and C6-C7    TONSILLECTOMY      TUBAL LIGATION         No family history on file  Medications have been verified  Objective   /57 (BP Location: Left arm, Patient Position: Sitting, Cuff Size: Large)   Pulse 79   Temp (!) 97 °F (36 1 °C) (Tympanic)   Resp 18   Ht 5' 2" (1 575 m)   Wt 76 2 kg (168 lb)   SpO2 100%   BMI 30 73 kg/m²        Physical Exam     Physical Exam   Constitutional: She is oriented to person, place, and time  She appears well-developed and well-nourished  HENT:   Head: Normocephalic and atraumatic  Neck: Normal range of motion  Cardiovascular: Normal rate and regular rhythm  Pulmonary/Chest: Effort normal    Abdominal: Soft  There is no tenderness  There is no rebound and no guarding  Right CVA tenderness   Neurological: She is alert and oriented to person, place, and time  Skin: Skin is warm and dry  Psychiatric: She has a normal mood and affect  Her behavior is normal    Nursing note and vitals reviewed

## 2020-02-18 LAB — BACTERIA UR CULT: NORMAL

## 2020-02-18 NOTE — DISCHARGE INSTRUCTIONS
Return to er with uncontrolled pain, fever, new or worsening symptoms or with any other concerns  See pcp this week for follow up

## 2020-09-18 ENCOUNTER — TELEPHONE (OUTPATIENT)
Dept: GYNECOLOGY | Facility: CLINIC | Age: 60
End: 2020-09-18

## 2020-09-18 NOTE — TELEPHONE ENCOUNTER
----- Message from [facility], Noble Jeffrey sent at 9/18/2020  1:04 PM EDT -----  Can this patient please be called to make sure she gets records for Monday? Thanks    Spoke to Pt   She will have records faxed

## 2020-09-18 NOTE — PROGRESS NOTES
Assessment/Plan:    Dyspareunia discussed at length  Patient will start consistent use of vaginal estrogen cream  Risks/benefits/instructions for use reviewed  Patient advised to use on the end of dilator to try to break up scar tissue  Also advised to attempt climax prior to penile penetration to attempt to disassociate pain with intercourse  Will RTO if sx persist or worsen  Recommended monthly SBE, annual CBE and the importance of annual screening mammo reviewed to avoid undiagnosed breast cancer  Colonoscopy noted to be up to date  The patient denies STI risk factors and declines testing at this time  Reviewed diet/activity recommendations Calcium 9223-3661 mg and Vit D 600-1000 IU daily  Discussed postmenopausal considerations and symptoms to report  Kegel exercises as instructed  RTO in one year for routine annual gyn exam or sooner PRN  Diagnoses and all orders for this visit:    Encounter for gynecological examination (general) (routine) with abnormal findings    Screening mammogram, encounter for  -     Mammo screening bilateral w 3d & cad; Future    Dyspareunia due to medical condition in female        Subjective:      Patient ID: Karan Pettit is a 64 y o  female  This patient presents for routine annual gyn exam  She is s/p hyst  She was a previous patient at [facility]  She has a hx of mesh exposure with multiple mesh revisions at [facility]  She denies vaginal bleeding  She reports dyspareunia with deep penetration, no pain with initial penetration  She is not using vaginal estrogen cream because she ran out  Patient purchased vaginal dilators and would like to try them in the office today  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  She denies VM sx, pelvic pain, breast concerns, abnormal discharge, bowel/bladder dysfunction, depression/anx  , sexually active and is monogamous  Denies STI concerns  No hx of STIs  Mammography normal, last done 2015  Colonoscopy up to date and normal, done 2 years ago per patient  The following portions of the patient's history were reviewed and updated as appropriate: allergies, current medications, past family history, past medical history, past social history, past surgical history and problem list     Review of Systems   Constitutional: Negative  Respiratory: Negative  Cardiovascular: Negative  Gastrointestinal: Negative  Endocrine: Negative  Genitourinary: Positive for dyspareunia  Negative for dysuria, frequency, pelvic pain, urgency, vaginal bleeding, vaginal discharge and vaginal pain  Musculoskeletal: Negative  Skin: Negative  Neurological: Negative  Psychiatric/Behavioral: Negative  Objective:      /72 (BP Location: Left arm, Patient Position: Sitting, Cuff Size: Standard)   Pulse 76   Ht 5' 0 5" (1 537 m)   Wt 78 7 kg (173 lb 6 4 oz)   BMI 33 31 kg/m²          Physical Exam  Vitals signs and nursing note reviewed  Exam conducted with a chaperone present  Constitutional:       Appearance: Normal appearance  She is well-developed  HENT:      Head: Normocephalic and atraumatic  Neck:      Musculoskeletal: Normal range of motion and neck supple  Thyroid: No thyroid mass or thyromegaly  Cardiovascular:      Rate and Rhythm: Normal rate and regular rhythm  Heart sounds: Normal heart sounds  Pulmonary:      Effort: Pulmonary effort is normal       Breath sounds: Normal breath sounds  Chest:      Breasts: Breasts are symmetrical          Right: No inverted nipple, mass, nipple discharge, skin change or tenderness  Left: No inverted nipple, mass, nipple discharge, skin change or tenderness  Abdominal:      General: Bowel sounds are normal       Palpations: Abdomen is soft  Tenderness: There is no abdominal tenderness  Hernia: There is no hernia in the left inguinal area or right inguinal area     Genitourinary:     General: Normal vulva       Exam position: Supine  Pubic Area: No rash  Labia:         Right: No rash, tenderness, lesion or injury  Left: No rash, tenderness, lesion or injury  Urethra: No prolapse, urethral pain, urethral swelling or urethral lesion  Vagina: Normal  No signs of injury and foreign body  No vaginal discharge, erythema, tenderness, bleeding, lesions or prolapsed vaginal walls  Uterus: Absent  Adnexa:         Right: No mass, tenderness or fullness  Left: No mass, tenderness or fullness  Rectum: No external hemorrhoid  Comments: Urethra normal without lesions  No bladder tenderness  Cervix, uterus absent, no masses or tenderness on BME  Introitus 3 cm, vaginal length 7 cm, no evidence of mesh exposure, granulation tissue or prolapse  No pain with speculum exam  Pinpoint tenderness noted with second to largest dilator at vaginal vault, suspect scar tissue  Moderate VVA  Musculoskeletal: Normal range of motion  Lymphadenopathy:      Lower Body: No right inguinal adenopathy  No left inguinal adenopathy  Skin:     General: Skin is warm and dry  Neurological:      Mental Status: She is alert and oriented to person, place, and time  Psychiatric:         Speech: Speech normal          Behavior: Behavior normal  Behavior is cooperative

## 2020-09-21 ENCOUNTER — OFFICE VISIT (OUTPATIENT)
Dept: GYNECOLOGY | Facility: CLINIC | Age: 60
End: 2020-09-21
Payer: COMMERCIAL

## 2020-09-21 VITALS
WEIGHT: 173.4 LBS | HEART RATE: 76 BPM | SYSTOLIC BLOOD PRESSURE: 122 MMHG | DIASTOLIC BLOOD PRESSURE: 72 MMHG | BODY MASS INDEX: 32.74 KG/M2 | HEIGHT: 61 IN

## 2020-09-21 DIAGNOSIS — Z01.411 ENCOUNTER FOR GYNECOLOGICAL EXAMINATION (GENERAL) (ROUTINE) WITH ABNORMAL FINDINGS: Primary | ICD-10-CM

## 2020-09-21 DIAGNOSIS — Z12.31 SCREENING MAMMOGRAM, ENCOUNTER FOR: ICD-10-CM

## 2020-09-21 DIAGNOSIS — N94.19 DYSPAREUNIA DUE TO MEDICAL CONDITION IN FEMALE: ICD-10-CM

## 2020-09-21 DIAGNOSIS — N95.2 VAGINAL ATROPHY: ICD-10-CM

## 2020-09-21 PROCEDURE — S0610 ANNUAL GYNECOLOGICAL EXAMINA: HCPCS | Performed by: OBSTETRICS & GYNECOLOGY

## 2020-09-21 RX ORDER — ESTRADIOL 0.1 MG/G
0.5 CREAM VAGINAL 2 TIMES WEEKLY
Qty: 42.5 G | Refills: 2 | Status: SHIPPED | OUTPATIENT
Start: 2020-09-21

## 2020-10-06 ENCOUNTER — OFFICE VISIT (OUTPATIENT)
Dept: URGENT CARE | Facility: CLINIC | Age: 60
End: 2020-10-06
Payer: COMMERCIAL

## 2020-10-06 VITALS
RESPIRATION RATE: 16 BRPM | OXYGEN SATURATION: 99 % | SYSTOLIC BLOOD PRESSURE: 150 MMHG | TEMPERATURE: 97.2 F | HEIGHT: 62 IN | HEART RATE: 71 BPM | BODY MASS INDEX: 32.31 KG/M2 | WEIGHT: 175.6 LBS | DIASTOLIC BLOOD PRESSURE: 70 MMHG

## 2020-10-06 DIAGNOSIS — M75.21 BICEPS TENDONITIS ON RIGHT: Primary | ICD-10-CM

## 2020-10-06 PROCEDURE — 99213 OFFICE O/P EST LOW 20 MIN: CPT | Performed by: PHYSICIAN ASSISTANT

## 2020-10-06 RX ORDER — METHYLPREDNISOLONE 4 MG/1
TABLET ORAL
Qty: 1 EACH | Refills: 0 | Status: SHIPPED | OUTPATIENT
Start: 2020-10-06

## 2020-10-29 ENCOUNTER — LAB (OUTPATIENT)
Dept: LAB | Facility: CLINIC | Age: 60
End: 2020-10-29
Payer: COMMERCIAL

## 2020-10-29 ENCOUNTER — TRANSCRIBE ORDERS (OUTPATIENT)
Dept: LAB | Facility: CLINIC | Age: 60
End: 2020-10-29

## 2020-10-29 DIAGNOSIS — Z79.899 ENCOUNTER FOR LONG-TERM (CURRENT) USE OF OTHER MEDICATIONS: ICD-10-CM

## 2020-10-29 DIAGNOSIS — E55.9 VITAMIN D DEFICIENCY: ICD-10-CM

## 2020-10-29 DIAGNOSIS — R73.9 HYPERGLYCEMIA: ICD-10-CM

## 2020-10-29 DIAGNOSIS — R63.5 WEIGHT GAIN: ICD-10-CM

## 2020-10-29 DIAGNOSIS — E78.5 HYPERLIPIDEMIA, UNSPECIFIED HYPERLIPIDEMIA TYPE: Primary | ICD-10-CM

## 2020-10-29 LAB
25(OH)D3 SERPL-MCNC: 32.1 NG/ML (ref 30–100)
ALBUMIN SERPL BCP-MCNC: 3.6 G/DL (ref 3.5–5)
ALP SERPL-CCNC: 70 U/L (ref 46–116)
ALT SERPL W P-5'-P-CCNC: 17 U/L (ref 12–78)
ANION GAP SERPL CALCULATED.3IONS-SCNC: 6 MMOL/L (ref 4–13)
AST SERPL W P-5'-P-CCNC: 10 U/L (ref 5–45)
BILIRUB SERPL-MCNC: 0.28 MG/DL (ref 0.2–1)
BUN SERPL-MCNC: 12 MG/DL (ref 5–25)
CALCIUM SERPL-MCNC: 9.3 MG/DL (ref 8.3–10.1)
CHLORIDE SERPL-SCNC: 104 MMOL/L (ref 100–108)
CHOLEST SERPL-MCNC: 223 MG/DL (ref 50–200)
CO2 SERPL-SCNC: 30 MMOL/L (ref 21–32)
CREAT SERPL-MCNC: 0.83 MG/DL (ref 0.6–1.3)
ERYTHROCYTE [DISTWIDTH] IN BLOOD BY AUTOMATED COUNT: 14.4 % (ref 11.6–15.1)
EST. AVERAGE GLUCOSE BLD GHB EST-MCNC: 114 MG/DL
GFR SERPL CREATININE-BSD FRML MDRD: 78 ML/MIN/1.73SQ M
GLUCOSE P FAST SERPL-MCNC: 98 MG/DL (ref 65–99)
HBA1C MFR BLD: 5.6 %
HCT VFR BLD AUTO: 42.9 % (ref 34.8–46.1)
HDLC SERPL-MCNC: 63 MG/DL
HGB BLD-MCNC: 14.6 G/DL (ref 11.5–15.4)
LDLC SERPL CALC-MCNC: 136 MG/DL (ref 0–100)
MCH RBC QN AUTO: 32.6 PG (ref 26.8–34.3)
MCHC RBC AUTO-ENTMCNC: 34 G/DL (ref 31.4–37.4)
MCV RBC AUTO: 96 FL (ref 82–98)
NONHDLC SERPL-MCNC: 160 MG/DL
PLATELET # BLD AUTO: 236 THOUSANDS/UL (ref 149–390)
PMV BLD AUTO: 11 FL (ref 8.9–12.7)
POTASSIUM SERPL-SCNC: 4.3 MMOL/L (ref 3.5–5.3)
PROT SERPL-MCNC: 7.4 G/DL (ref 6.4–8.2)
RBC # BLD AUTO: 4.48 MILLION/UL (ref 3.81–5.12)
SODIUM SERPL-SCNC: 140 MMOL/L (ref 136–145)
T4 FREE SERPL-MCNC: 0.99 NG/DL (ref 0.76–1.46)
TRIGL SERPL-MCNC: 120 MG/DL
TSH SERPL DL<=0.05 MIU/L-ACNC: 0.89 UIU/ML (ref 0.36–3.74)
WBC # BLD AUTO: 6.1 THOUSAND/UL (ref 4.31–10.16)

## 2020-10-29 PROCEDURE — 83036 HEMOGLOBIN GLYCOSYLATED A1C: CPT

## 2020-10-29 PROCEDURE — 80061 LIPID PANEL: CPT

## 2020-10-29 PROCEDURE — 82306 VITAMIN D 25 HYDROXY: CPT

## 2020-10-29 PROCEDURE — 36415 COLL VENOUS BLD VENIPUNCTURE: CPT

## 2020-10-29 PROCEDURE — 85027 COMPLETE CBC AUTOMATED: CPT

## 2020-10-29 PROCEDURE — 84439 ASSAY OF FREE THYROXINE: CPT

## 2020-10-29 PROCEDURE — 80053 COMPREHEN METABOLIC PANEL: CPT

## 2020-10-29 PROCEDURE — 84443 ASSAY THYROID STIM HORMONE: CPT

## 2020-11-15 ENCOUNTER — NURSE TRIAGE (OUTPATIENT)
Dept: OTHER | Facility: OTHER | Age: 60
End: 2020-11-15

## 2020-11-15 DIAGNOSIS — Z20.828 SARS-ASSOCIATED CORONAVIRUS EXPOSURE: Primary | ICD-10-CM

## 2020-11-15 DIAGNOSIS — Z20.828 SARS-ASSOCIATED CORONAVIRUS EXPOSURE: ICD-10-CM

## 2020-11-15 PROCEDURE — U0003 INFECTIOUS AGENT DETECTION BY NUCLEIC ACID (DNA OR RNA); SEVERE ACUTE RESPIRATORY SYNDROME CORONAVIRUS 2 (SARS-COV-2) (CORONAVIRUS DISEASE [COVID-19]), AMPLIFIED PROBE TECHNIQUE, MAKING USE OF HIGH THROUGHPUT TECHNOLOGIES AS DESCRIBED BY CMS-2020-01-R: HCPCS | Performed by: FAMILY MEDICINE

## 2020-11-16 LAB — SARS-COV-2 RNA SPEC QL NAA+PROBE: NOT DETECTED

## 2021-03-10 DIAGNOSIS — Z23 ENCOUNTER FOR IMMUNIZATION: ICD-10-CM

## 2021-09-17 ENCOUNTER — DOCUMENTATION (OUTPATIENT)
Dept: GYNECOLOGY | Facility: CLINIC | Age: 61
End: 2021-09-17

## 2021-09-23 PROCEDURE — U0003 INFECTIOUS AGENT DETECTION BY NUCLEIC ACID (DNA OR RNA); SEVERE ACUTE RESPIRATORY SYNDROME CORONAVIRUS 2 (SARS-COV-2) (CORONAVIRUS DISEASE [COVID-19]), AMPLIFIED PROBE TECHNIQUE, MAKING USE OF HIGH THROUGHPUT TECHNOLOGIES AS DESCRIBED BY CMS-2020-01-R: HCPCS | Performed by: INTERNAL MEDICINE

## 2021-09-23 PROCEDURE — U0005 INFEC AGEN DETEC AMPLI PROBE: HCPCS | Performed by: INTERNAL MEDICINE

## 2022-03-30 ENCOUNTER — OFFICE VISIT (OUTPATIENT)
Dept: URGENT CARE | Facility: CLINIC | Age: 62
End: 2022-03-30
Payer: COMMERCIAL

## 2022-03-30 VITALS — RESPIRATION RATE: 24 BRPM | HEART RATE: 88 BPM | TEMPERATURE: 98.1 F | OXYGEN SATURATION: 94 %

## 2022-03-30 DIAGNOSIS — J06.9 ACUTE UPPER RESPIRATORY INFECTION: Primary | ICD-10-CM

## 2022-03-30 DIAGNOSIS — R05.1 ACUTE COUGH: ICD-10-CM

## 2022-03-30 PROCEDURE — 99214 OFFICE O/P EST MOD 30 MIN: CPT | Performed by: NURSE PRACTITIONER

## 2022-03-30 RX ORDER — AZITHROMYCIN 250 MG/1
TABLET, FILM COATED ORAL
Qty: 6 TABLET | Refills: 0 | Status: SHIPPED | OUTPATIENT
Start: 2022-03-30 | End: 2022-04-03

## 2022-03-30 RX ORDER — ALBUTEROL SULFATE 90 UG/1
2 AEROSOL, METERED RESPIRATORY (INHALATION) EVERY 6 HOURS PRN
Qty: 8.5 G | Refills: 0 | Status: SHIPPED | OUTPATIENT
Start: 2022-03-30

## 2022-03-30 NOTE — PATIENT INSTRUCTIONS
You have been prescribe azithromycin and an albuterol inhaler - use as prescribed  You are to get robitussin OTC, increase water intake   Follow up with your PCP  Go to the ED if symptoms worsen     Upper Respiratory Infection   WHAT YOU NEED TO KNOW:   An upper respiratory infection is also called a cold  It can affect your nose, throat, ears, and sinuses  Cold symptoms are usually worst for the first 3 to 5 days  Most people get better in 7 to 14 days  You may continue to cough for 2 to 3 weeks  Colds are caused by viruses and do not get better with antibiotics  DISCHARGE INSTRUCTIONS:   Call your local emergency number (911 in the [address] [place] [address],[address]) if:   · You have chest pain or trouble breathing  Return to the emergency department if:   · You have a fever over 102ºF (39ºC)  Call your doctor if:   · You have a low fever  · Your sore throat gets worse or you see white or yellow spots in your throat  · Your symptoms get worse after 3 to 5 days or are not better in 14 days  · You have a rash anywhere on your skin  · You have large, tender lumps in your neck  · You have thick, green, or yellow drainage from your nose  · You cough up thick yellow, green, or bloody mucus  · You have a bad earache  · You have questions or concerns about your condition or care  Medicines: You may need any of the following:  · Decongestants  help reduce nasal congestion and help you breathe more easily  If you take decongestant pills, they may make you feel restless or cause problems with your sleep  Do not use decongestant sprays for more than a few days  · Cough suppressants  help reduce coughing  Ask your healthcare provider which type of cough medicine is best for you  · NSAIDs , such as ibuprofen, help decrease swelling, pain, and fever  NSAIDs can cause stomach bleeding or kidney problems in certain people   If you take blood thinner medicine, always ask your healthcare provider if NSAIDs are safe for you  Always read the medicine label and follow directions  · Acetaminophen  decreases pain and fever  It is available without a doctor's order  Ask how much to take and how often to take it  Follow directions  Read the labels of all other medicines you are using to see if they also contain acetaminophen, or ask your doctor or pharmacist  Acetaminophen can cause liver damage if not taken correctly  Do not use more than 4 grams (4,000 milligrams) total of acetaminophen in one day  · Take your medicine as directed  Contact your healthcare provider if you think your medicine is not helping or if you have side effects  Tell him or her if you are allergic to any medicine  Keep a list of the medicines, vitamins, and herbs you take  Include the amounts, and when and why you take them  Bring the list or the pill bottles to follow-up visits  Carry your medicine list with you in case of an emergency  Self-care:   · Rest as much as possible  Slowly start to do more each day  · Drink more liquids as directed  Liquids will help thin and loosen mucus so you can cough it up  Liquids will also help prevent dehydration  Liquids that help prevent dehydration include water, fruit juice, and broth  Do not drink liquids that contain caffeine  Caffeine can increase your risk for dehydration  Ask your healthcare provider how much liquid to drink each day  · Soothe a sore throat  Gargle with warm salt water  Make salt water by dissolving ¼ teaspoon salt in 1 cup warm water  You may also suck on hard candy or throat lozenges  You may use a sore throat spray  · Use a humidifier or vaporizer  Use a cool mist humidifier or a vaporizer to increase air moisture in your home  This may make it easier for you to breathe and help decrease your cough  · Use saline nasal drops as directed  These help relieve congestion  · Apply petroleum-based jelly around the outside of your nostrils    This can decrease irritation from blowing your nose  · Do not smoke  Nicotine and other chemicals in cigarettes and cigars can make your symptoms worse  They can also cause infections such as bronchitis or pneumonia  Ask your healthcare provider for information if you currently smoke and need help to quit  E-cigarettes or smokeless tobacco still contain nicotine  Talk to your healthcare provider before you use these products  Prevent a cold:   · Wash your hands often  Use soap and water every time you wash your hands  Rub your soapy hands together, lacing your fingers  Use the fingers of one hand to scrub under the nails of the other hand  Wash for at least 20 seconds  Rinse with warm, running water for several seconds  Then dry your hands  Use germ-killing gel if soap and water are not available  Do not touch your eyes or mouth without washing your hands first          · Cover a sneeze or cough  Use a tissue that covers your mouth and nose  Put the used tissue in the trash right away  Use the bend of your arm if a tissue is not available  Wash your hands well with soap and water or use a hand   Do not stand close to anyone who is sneezing or coughing  · Try to stay away from others while you are sick  This is especially important during the first 2 to 3 days when the virus is more easily spread  Wait until a fever, cough, or other symptoms are gone before you return to work or other regular activities  · Do not share items while you are sick  This includes food, drinks, eating utensils, and dishes  Follow up with your doctor as directed:  Write down your questions so you remember to ask them during your visits  © [employer] 2022 Information is for End User's use only and may not be sold, redistributed or otherwise used for commercial purposes   All illustrations and images included in CareNotes® are the copyrighted property of [employer]  or Jermaine Stratton   The above information is an educational aid only  It is not intended as medical advice for individual conditions or treatments  Talk to your doctor, nurse or pharmacist before following any medical regimen to see if it is safe and effective for you

## 2022-03-30 NOTE — PROGRESS NOTES
[address] [facility]        NAME: Jorge Box is a 63 y o  female  : 1960    MRN: [id number]  DATE: 2022  TIME: 1:46 PM    Assessment and Plan   Acute upper respiratory infection [J06 9]  1  Acute upper respiratory infection  azithromycin (ZITHROMAX) 250 mg tablet    albuterol (ProAir HFA) 90 mcg/act inhaler   2  Acute cough  azithromycin (ZITHROMAX) 250 mg tablet    albuterol (ProAir HFA) 90 mcg/act inhaler         Patient Instructions       Follow up with PCP in 3-5 days  Proceed to  ER if symptoms worsen  You have been prescribe azithromycin and an albuterol inhaler - use as prescribed  You are to get robitussin OTC, increase water intake   Follow up with your PCP  Go to the ED if symptoms worsen       Chief Complaint     Chief Complaint   Patient presents with    Cough     started yesterday     Nasal Congestion     started yesterday     Sore Throat     started yesterday          History of Present Illness       This is a 63year old female who states started Monday into Tuesday with nasal congestion, cough, headaches  Yesterday chest congestion, sorethroat  Headaches are frontal took tylenol with little relief  Took dayquil and sudafed for congestion which didn't help  Cough is tight and dry  Non productive cough  Mucous taste with cough  Denies CP   + SOB with coughing fits but resolve with stopping coughing  Denies fevers  N/v/d   + chills  Review of Systems   Review of Systems   Constitutional: Positive for chills  HENT: Positive for congestion and sore throat  Eyes: Negative  Respiratory: Positive for cough, chest tightness and shortness of breath  Cardiovascular: Negative  Gastrointestinal: Negative  Endocrine: Negative  Genitourinary: Negative  Musculoskeletal: Negative  Skin: Negative  Allergic/Immunologic: Negative  Neurological: Positive for headaches  Hematological: Negative  Psychiatric/Behavioral: Negative  Current Medications       Current Outpatient Medications:     butalbital-acetaminophen-caffeine (FIORICET,ESGIC) -40 mg per tablet, as needed, Disp: , Rfl:     divalproex sodium (DEPAKOTE) 500 mg EC tablet, Take by mouth, Disp: , Rfl:     LORazepam (ATIVAN) 0 5 mg tablet, Take 0 5 mg by mouth daily at bedtime as needed for anxiety  , Disp: , Rfl:     pantoprazole (PROTONIX) 40 mg tablet, Take 40 mg by mouth 2 (two) times a day, Disp: , Rfl:     QUEtiapine (SEROquel) 300 mg tablet, Take 300 mg by mouth, Disp: , Rfl:     albuterol (ProAir HFA) 90 mcg/act inhaler, Inhale 2 puffs every 6 (six) hours as needed for wheezing, Disp: 8 5 g, Rfl: 0    azithromycin (ZITHROMAX) 250 mg tablet, Take 2 tablets today then 1 tablet daily x 4 days, Disp: 6 tablet, Rfl: 0    estradiol (ESTRACE) 0 1 mg/g vaginal cream, Insert 0 5 g into the vagina 2 (two) times a week, Disp: 42 5 g, Rfl: 2    methylPREDNISolone 4 MG tablet therapy pack, Use as directed on package (Patient not taking: Reported on 3/30/2022 ), Disp: 1 each, Rfl: 0    Current Allergies     Allergies as of 03/30/2022 - Reviewed 03/30/2022   Allergen Reaction Noted    Lyrica [pregabalin] Other (See Comments) 09/25/2017            The following portions of the patient's history were reviewed and updated as appropriate: allergies, current medications, past family history, past medical history, past social history, past surgical history and problem list      Past Medical History:   Diagnosis Date    Bipolar disorder ([facility] [address] )     Depression     GERD (gastroesophageal reflux disease)     Headache     after anesthesia    Hyperlipidemia     migraines     Wears glasses        Past Surgical History:   Procedure Laterality Date    CYST REMOVAL Right     hand    CYSTOSCOPY N/A 2/8/2017    Procedure: CYSTOSCOPY;  Surgeon: Tanja Dumont MD;  [facility];  Service:     EXAMINATION UNDER ANESTHESIA N/A 2/8/2017    Procedure: EXAM UNDER ANESTHESIA, RESECTION OF VAGINAL GRAFT;  Surgeon: Lc Yung MD;  [facility];  Service:     HYSTERECTOMY      NV CYSTOURETHROSCOPY N/A 9/27/2017    Procedure: José Miguel Gey;  Surgeon: Lc Yung MD;  [facility];  Service: UroGynecology           NV REVISION VAGINAL GRAFT N/A 9/27/2017    Procedure: RESECTION OF EXPOSED MESH VAGINAL AND PARAVAGINAL TISSUE;  Surgeon: Lc Yung MD;  [facility];  Service: UroGynecology           SPINAL FUSION      C5-C6 and C6-C7    TONSILLECTOMY      TUBAL LIGATION         Family History   Problem Relation Age of Onset    Pseudochol deficiency Mother          Medications have been verified  Objective   Pulse 88   Temp 98 1 °F (36 7 °C)   Resp (!) 24   SpO2 94%   No LMP recorded  Patient has had a hysterectomy  Physical Exam     Physical Exam  Vitals and nursing note reviewed  Constitutional:       General: She is not in acute distress  Appearance: She is well-developed and normal weight  She is not ill-appearing, toxic-appearing or diaphoretic  HENT:      Head: Normocephalic and atraumatic  Right Ear: Tympanic membrane and ear canal normal       Left Ear: Tympanic membrane and ear canal normal       Nose: Congestion present  No rhinorrhea  Mouth/Throat:      Mouth: Mucous membranes are moist  No oral lesions  Pharynx: Uvula midline  Posterior oropharyngeal erythema present  No pharyngeal swelling, oropharyngeal exudate or uvula swelling  Eyes:      Pupils: Pupils are equal, round, and reactive to light  Cardiovascular:      Rate and Rhythm: Normal rate and regular rhythm  Heart sounds: Normal heart sounds  Pulmonary:      Effort: Pulmonary effort is normal  No respiratory distress  Breath sounds: Normal breath sounds  No stridor  No wheezing, rhonchi or rales  Chest:      Chest wall: No tenderness  Abdominal:      General: Bowel sounds are normal       Palpations: Abdomen is soft  Musculoskeletal:      Cervical back: Normal range of motion and neck supple  Skin:     General: Skin is warm and dry  Capillary Refill: Capillary refill takes less than 2 seconds  Neurological:      General: No focal deficit present  Mental Status: She is alert and oriented to person, place, and time     Psychiatric:         Mood and Affect: Mood normal          Behavior: Behavior normal

## 2023-05-25 ENCOUNTER — APPOINTMENT (OUTPATIENT)
Age: 63
End: 2023-05-25

## 2023-05-25 ENCOUNTER — OFFICE VISIT (OUTPATIENT)
Dept: URGENT CARE | Facility: CLINIC | Age: 63
End: 2023-05-25

## 2023-05-25 DIAGNOSIS — Z02.1 ENCOUNTER FOR PRE-EMPLOYMENT EXAMINATION: Primary | ICD-10-CM

## 2023-05-26 ENCOUNTER — APPOINTMENT (OUTPATIENT)
Age: 63
End: 2023-05-26

## 2023-05-26 LAB
MEV IGG SER QL IA: NORMAL
MUV IGG SER QL IA: NORMAL
RUBV IGG SERPL IA-ACNC: 226.4 IU/ML
VZV IGG SER QL IA: NORMAL

## 2023-05-28 LAB
GAMMA INTERFERON BACKGROUND BLD IA-ACNC: 0.03 IU/ML
M TB IFN-G BLD-IMP: NEGATIVE
M TB IFN-G CD4+ BCKGRND COR BLD-ACNC: 0.01 IU/ML
M TB IFN-G CD4+ BCKGRND COR BLD-ACNC: 0.01 IU/ML
MITOGEN IGNF BCKGRD COR BLD-ACNC: >10 IU/ML

## 2023-06-16 ENCOUNTER — TELEPHONE (OUTPATIENT)
Dept: FAMILY MEDICINE CLINIC | Facility: CLINIC | Age: 63
End: 2023-06-16

## 2023-06-16 DIAGNOSIS — M47.12 CERVICAL SPONDYLOSIS WITH MYELOPATHY: Primary | ICD-10-CM

## 2023-06-16 DIAGNOSIS — M50.30 DDD (DEGENERATIVE DISC DISEASE), CERVICAL: ICD-10-CM

## 2023-06-16 RX ORDER — METHOCARBAMOL 750 MG/1
750 TABLET, FILM COATED ORAL EVERY 6 HOURS PRN
Qty: 30 TABLET | Refills: 3 | Status: SHIPPED | OUTPATIENT
Start: 2023-06-16

## 2023-06-16 RX ORDER — TRAMADOL HYDROCHLORIDE 50 MG/1
50 TABLET ORAL EVERY 6 HOURS PRN
Qty: 30 TABLET | Refills: 1 | Status: SHIPPED | OUTPATIENT
Start: 2023-06-16

## 2023-07-13 ENCOUNTER — TELEPHONE (OUTPATIENT)
Dept: ADMINISTRATIVE | Facility: OTHER | Age: 63
End: 2023-07-13

## 2023-07-13 NOTE — LETTER
Procedure Request Form: Colonoscopy      Date Requested: 23  Patient: Heri Leena  Patient :    Referring Provider: Kedar Dutton MD        Date of Procedure _______2018_______________________       The above patient has informed us that they have completed their   most recent Colonoscopy at your facility. Please complete   this form and attach all corresponding procedure reports/results. Comments __________________________________________________________  ____________________________________________________________________  ____________________________________________________________________  ____________________________________________________________________    Facility Completing Procedure _________________________________________    Form Completed By (print name) _______________________________________      Signature __________________________________________________________      These reports are needed for  compliance. Please fax this completed form and a copy of the procedure report to our office located at [address] [place] [address] as soon as possible to Fax [phone] iraj Mccarthy: Phone [phone]    We thank you for your assistance in treating our mutual patient.

## 2023-07-13 NOTE — TELEPHONE ENCOUNTER
----- Message from Jerome Braden MA sent at 7/12/2023 12:58 PM EDT -----  Regarding: colonocopy  07/12/23 12:58 PM    Hello, our patient Mariella Jerry has had CRC: Colonoscopy completed/performed. Please assist in updating the patient chart by making an External outreach to [facility]  facility located in [facility] . The date of service is 5/2018.     Thank you,  J[facility]

## 2023-07-13 NOTE — TELEPHONE ENCOUNTER
Upon review of the In Basket request and the patient's chart, initial outreach has been made via fax to facility. Please see Contacts section for details.      Thank you  Joseluis Moore

## 2023-07-14 NOTE — TELEPHONE ENCOUNTER
Upon review of the In Basket request we were able to locate, review, and update the patient chart as requested for CRC: Colonoscopy. Any additional questions or concerns should be emailed to the Practice Liaisons via the appropriate education email address, please do not reply via In Basket.     Thank you  Binta Aragon

## 2023-08-02 ENCOUNTER — OFFICE VISIT (OUTPATIENT)
Dept: FAMILY MEDICINE CLINIC | Facility: CLINIC | Age: 63
End: 2023-08-02
Payer: COMMERCIAL

## 2023-08-02 VITALS
HEART RATE: 70 BPM | BODY MASS INDEX: 34.17 KG/M2 | HEIGHT: 61 IN | OXYGEN SATURATION: 99 % | TEMPERATURE: 97.5 F | SYSTOLIC BLOOD PRESSURE: 142 MMHG | WEIGHT: 181 LBS | DIASTOLIC BLOOD PRESSURE: 90 MMHG

## 2023-08-02 DIAGNOSIS — M54.2 NECK PAIN: ICD-10-CM

## 2023-08-02 DIAGNOSIS — Z12.31 ENCOUNTER FOR SCREENING MAMMOGRAM FOR MALIGNANT NEOPLASM OF BREAST: ICD-10-CM

## 2023-08-02 DIAGNOSIS — E55.9 VITAMIN D DEFICIENCY: ICD-10-CM

## 2023-08-02 DIAGNOSIS — M50.30 DDD (DEGENERATIVE DISC DISEASE), CERVICAL: Primary | ICD-10-CM

## 2023-08-02 DIAGNOSIS — M47.12 CERVICAL SPONDYLOSIS WITH MYELOPATHY: ICD-10-CM

## 2023-08-02 DIAGNOSIS — E53.8 B12 DEFICIENCY DUE TO DIET: ICD-10-CM

## 2023-08-02 DIAGNOSIS — F31.81 MODERATE BIPOLAR II DISORDER, MAJOR DEPRESSIVE EPISODE, IN FULL REMISSION, WITH MIXED FEATURES (HCC): ICD-10-CM

## 2023-08-02 DIAGNOSIS — K21.9 GASTROESOPHAGEAL REFLUX DISEASE WITHOUT ESOPHAGITIS: ICD-10-CM

## 2023-08-02 DIAGNOSIS — E66.09 CLASS 1 OBESITY DUE TO EXCESS CALORIES WITHOUT SERIOUS COMORBIDITY WITH BODY MASS INDEX (BMI) OF 34.0 TO 34.9 IN ADULT: ICD-10-CM

## 2023-08-02 DIAGNOSIS — R73.09 ABNORMAL GLUCOSE: ICD-10-CM

## 2023-08-02 PROBLEM — E66.811 CLASS 1 OBESITY DUE TO EXCESS CALORIES IN ADULT: Status: ACTIVE | Noted: 2023-08-02

## 2023-08-02 PROCEDURE — 99214 OFFICE O/P EST MOD 30 MIN: CPT | Performed by: FAMILY MEDICINE

## 2023-08-02 NOTE — PROGRESS NOTES
Name: Robert Vasquez      :       MRN: [id number]  Encounter Provider: Juancarlos Grimes DO  Encounter Date: 2023   Encounter department: [address] [facility]    Assessment & Plan     1. DDD (degenerative disc disease), cervical  Assessment & Plan: With radiculopathy. We will obtain new MRI C-spine. Referral back to her spinal surgeon, Dr. Xavier Silver from [facility]. Advised she could try the gabapentin that was prescribed to her by her old PCP. She is unsure of dose. But she could try it in the morning, and monitor for side effects such as fatigue. Orders:  -     MRI lumbar spine w contrast; Future; Expected date: 2023  -     Ambulatory Referral to Orthopedic Surgery; Future    2. Cervical spondylosis with myelopathy    3. Class 1 obesity due to excess calories without serious comorbidity with body mass index (BMI) of 34.0 to 34.9 in adult  Assessment & Plan:  Discussed GLP-1 pharmaceutical class, link to look into [facility]. Issue is cost.  Prescription sent to pharmacy with verbal instructions on use and potential side effects. Checking baseline A1c today. Follow-up 1 month. Orders:  -     Semaglutide-Weight Management (WEGOVY) 0.25 MG/0.5ML; Inject 0.5 mL (0.25 mg total) under the skin once a week for 4 doses    4. Moderate bipolar II disorder, major depressive episode, in full remission, with mixed features [facility])  Assessment & Plan:  Discussed medical management. Her medications have remained the same for quite some time, she is unsure of their efficacy anymore. Not particularly interested in revisiting psychiatry right now. May consider. Orders:  -     Valproic acid level, total; Future    5. Gastroesophageal reflux disease without esophagitis  Comments:  Continue PPI    6. Neck pain    7. Encounter for screening mammogram for malignant neoplasm of breast  -     Mammo screening bilateral w 3d & cad; Future; Expected date: 2023    8.  B12 deficiency due to diet  Assessment & Plan:  Rule out B12 deficiency as cause of neuropathy. More likely neuropathy related to cervical spine disease. Orders:  -     Vitamin B12; Future  -     CBC and differential; Future    9. Abnormal glucose  -     Comprehensive metabolic panel; Future  -     HEMOGLOBIN A1C W/ EAG ESTIMATION; Future    10. Vitamin D deficiency  -     Vitamin D 25 hydroxy; Future    BMI Counseling: Body mass index is 34.77 kg/m². The BMI is above normal. Nutrition recommendations include encouraging healthy choices of fruits and vegetables, moderation in carbohydrate intake and increasing intake of lean protein. Rationale for BMI follow-up plan is due to patient being overweight or obese. Depression Screening and Follow-up Plan: Patient was screened for depression during today's encounter. They screened negative with a PHQ-2 score of 2. Subjective      Neck pain and dizziness- continued neck pain, "cracks" with movement, pain and tingling R>L upper extremities. Xrays approx 4 months ago, hardware was in place at that time. Had spinal fusion 9 yrs ago C5-7, Dr. Kylah Taveras. Has not needed follow up but slowly pain and restricted range of motion is returning. Prev used lyrica, caused fatigue so she stopped medication. Has gabapentin at home from prior PCP but afraid it will interact with her psych meds she takes at night. Cool marijuana also helps with pain. BP- has gained some weight and having dizzy spells, wondering about BP. Typically feels dizzy with positional changes like bending over, sit-to-stand. Not checking Bps at home. C/o fatigue. Appetite is good but eating habits are poor. Drinks adequate water. Mood- recently changed jobs and noted a change in mood, had to stop MM for sometime, Sstressed and feels overwhelmed. Previously saw psychiatry. On same meds x 17 years, no longer sees psych, uses atarax prn during the day. Does not use MM during working hours. Typically in the evening or at bedtime. Helps with anxiety. Justice Burgerus her recent weight gain, patient is interested in [facility]. She is unsure of insurance coverage, and she wanted to make sure it would not interact with her psychiatric medications. Review of Systems   Constitutional: Positive for activity change, fatigue and unexpected weight change. Negative for appetite change and fever. Respiratory: Negative for apnea, cough, choking, chest tightness and shortness of breath. Musculoskeletal: Positive for neck pain and neck stiffness. Negative for joint swelling. Neurological: Positive for dizziness and numbness. Negative for facial asymmetry. Psychiatric/Behavioral: Positive for decreased concentration and dysphoric mood. Negative for agitation, behavioral problems, confusion and sleep disturbance. The patient is nervous/anxious.         Current Outpatient Medications on File Prior to Visit   Medication Sig   • divalproex sodium (DEPAKOTE) 500 mg EC tablet Take 500 mg by mouth in the morning Pt takes 2 tabs @ HS   • hydrOXYzine HCL (ATARAX) 25 mg tablet Take 25 mg by mouth daily as needed   • methocarbamol (Robaxin-750) 750 mg tablet Take 1 tablet (750 mg total) by mouth every 6 (six) hours as needed for muscle spasms   • pantoprazole (PROTONIX) 40 mg tablet Take 40 mg by mouth 2 (two) times a day   • QUEtiapine (SEROquel) 300 mg tablet Take 300 mg by mouth daily at bedtime   • traMADol (Ultram) 50 mg tablet Take 1 tablet (50 mg total) by mouth every 6 (six) hours as needed for moderate pain   • [DISCONTINUED] albuterol (ProAir HFA) 90 mcg/act inhaler Inhale 2 puffs every 6 (six) hours as needed for wheezing   • [DISCONTINUED] butalbital-acetaminophen-caffeine (FIORICET,ESGIC) -40 mg per tablet as needed   • [DISCONTINUED] divalproex sodium (DEPAKOTE ER) 500 mg 24 hr tablet    • [DISCONTINUED] estradiol (ESTRACE) 0.1 mg/g vaginal cream Insert 0.5 g into the vagina 2 (two) times a week (Patient not taking: Reported on 8/2/2023)   • [DISCONTINUED] LORazepam (ATIVAN) 0.5 mg tablet Take 0.5 mg by mouth daily at bedtime as needed for anxiety     • [DISCONTINUED] methylPREDNISolone 4 MG tablet therapy pack Use as directed on package (Patient not taking: Reported on 3/30/2022 )   • [DISCONTINUED] metroNIDAZOLE (METROGEL) 0.75 % vaginal gel metronidazole 0.75 % (37.5 mg/5 gram) vaginal gel       Objective     /90 (BP Location: Right arm, Patient Position: Sitting, Cuff Size: Adult)   Pulse 70   Temp 97.5 °F (36.4 °C) (Tympanic)   Ht 5' 0.5" (1.537 m)   Wt 82.1 kg (181 lb)   SpO2 99%   BMI 34.77 kg/m²     Physical Exam  Vitals and nursing note reviewed. Constitutional:       General: She is not in acute distress. Appearance: Normal appearance. She is not ill-appearing. Cardiovascular:      Rate and Rhythm: Normal rate and regular rhythm. Musculoskeletal:      Cervical back: Normal range of motion and neck supple. No tenderness. Neurological:      General: No focal deficit present. Mental Status: She is alert. Mental status is at baseline. Motor: No weakness. Gait: Gait normal.   Psychiatric:         Thought Content:  Thought content normal.         Judgment: Judgment normal.       Valere Flatter, DO

## 2023-08-02 NOTE — ASSESSMENT & PLAN NOTE
Discussed medical management. Her medications have remained the same for quite some time, she is unsure of their efficacy anymore. Not particularly interested in revisiting psychiatry right now. May consider.

## 2023-08-02 NOTE — ASSESSMENT & PLAN NOTE
With radiculopathy. We will obtain new MRI C-spine. Referral back to her spinal surgeon, Dr. Albina Vivar from [facility]. Advised she could try the gabapentin that was prescribed to her by her old PCP. She is unsure of dose. But she could try it in the morning, and monitor for side effects such as fatigue.

## 2023-08-02 NOTE — ASSESSMENT & PLAN NOTE
Discussed GLP-1 pharmaceutical class, link to look into [facility]. Issue is cost.  Prescription sent to pharmacy with verbal instructions on use and potential side effects. Checking baseline A1c today. Follow-up 1 month.

## 2023-08-02 NOTE — ASSESSMENT & PLAN NOTE
Rule out B12 deficiency as cause of neuropathy. More likely neuropathy related to cervical spine disease.

## 2023-08-03 ENCOUNTER — APPOINTMENT (OUTPATIENT)
Age: 63
End: 2023-08-03
Payer: COMMERCIAL

## 2023-08-03 ENCOUNTER — TELEPHONE (OUTPATIENT)
Dept: FAMILY MEDICINE CLINIC | Facility: CLINIC | Age: 63
End: 2023-08-03

## 2023-08-03 DIAGNOSIS — E53.8 B12 DEFICIENCY DUE TO DIET: ICD-10-CM

## 2023-08-03 DIAGNOSIS — E55.9 VITAMIN D DEFICIENCY: ICD-10-CM

## 2023-08-03 DIAGNOSIS — R73.09 ABNORMAL GLUCOSE: ICD-10-CM

## 2023-08-03 DIAGNOSIS — F31.81 MODERATE BIPOLAR II DISORDER, MAJOR DEPRESSIVE EPISODE, IN FULL REMISSION, WITH MIXED FEATURES (HCC): ICD-10-CM

## 2023-08-03 LAB
25(OH)D3 SERPL-MCNC: 36.7 NG/ML (ref 30–100)
ALBUMIN SERPL BCP-MCNC: 3.9 G/DL (ref 3.5–5)
ALP SERPL-CCNC: 80 U/L (ref 46–116)
ALT SERPL W P-5'-P-CCNC: 18 U/L (ref 12–78)
ANION GAP SERPL CALCULATED.3IONS-SCNC: 6 MMOL/L
AST SERPL W P-5'-P-CCNC: 21 U/L (ref 5–45)
BASOPHILS # BLD AUTO: 0.06 THOUSANDS/ÂΜL (ref 0–0.1)
BASOPHILS NFR BLD AUTO: 1 % (ref 0–1)
BILIRUB SERPL-MCNC: 0.3 MG/DL (ref 0.2–1)
BUN SERPL-MCNC: 11 MG/DL (ref 5–25)
CALCIUM SERPL-MCNC: 9.8 MG/DL (ref 8.3–10.1)
CHLORIDE SERPL-SCNC: 105 MMOL/L (ref 96–108)
CO2 SERPL-SCNC: 28 MMOL/L (ref 21–32)
CREAT SERPL-MCNC: 1.06 MG/DL (ref 0.6–1.3)
EOSINOPHIL # BLD AUTO: 0.46 THOUSAND/ÂΜL (ref 0–0.61)
EOSINOPHIL NFR BLD AUTO: 7 % (ref 0–6)
ERYTHROCYTE [DISTWIDTH] IN BLOOD BY AUTOMATED COUNT: 14.6 % (ref 11.6–15.1)
EST. AVERAGE GLUCOSE BLD GHB EST-MCNC: 123 MG/DL
GFR SERPL CREATININE-BSD FRML MDRD: 56 ML/MIN/1.73SQ M
GLUCOSE P FAST SERPL-MCNC: 111 MG/DL (ref 65–99)
HBA1C MFR BLD: 5.9 %
HCT VFR BLD AUTO: 45.7 % (ref 34.8–46.1)
HGB BLD-MCNC: 14.8 G/DL (ref 11.5–15.4)
IMM GRANULOCYTES # BLD AUTO: 0.03 THOUSAND/UL (ref 0–0.2)
IMM GRANULOCYTES NFR BLD AUTO: 0 % (ref 0–2)
LYMPHOCYTES # BLD AUTO: 2.69 THOUSANDS/ÂΜL (ref 0.6–4.47)
LYMPHOCYTES NFR BLD AUTO: 40 % (ref 14–44)
MCH RBC QN AUTO: 31.5 PG (ref 26.8–34.3)
MCHC RBC AUTO-ENTMCNC: 32.4 G/DL (ref 31.4–37.4)
MCV RBC AUTO: 97 FL (ref 82–98)
MONOCYTES # BLD AUTO: 0.61 THOUSAND/ÂΜL (ref 0.17–1.22)
MONOCYTES NFR BLD AUTO: 9 % (ref 4–12)
NEUTROPHILS # BLD AUTO: 2.88 THOUSANDS/ÂΜL (ref 1.85–7.62)
NEUTS SEG NFR BLD AUTO: 43 % (ref 43–75)
NRBC BLD AUTO-RTO: 0 /100 WBCS
PLATELET # BLD AUTO: 268 THOUSANDS/UL (ref 149–390)
PMV BLD AUTO: 11.1 FL (ref 8.9–12.7)
POTASSIUM SERPL-SCNC: 4.6 MMOL/L (ref 3.5–5.3)
PROT SERPL-MCNC: 7.8 G/DL (ref 6.4–8.4)
RBC # BLD AUTO: 4.7 MILLION/UL (ref 3.81–5.12)
SODIUM SERPL-SCNC: 139 MMOL/L (ref 135–147)
VALPROATE SERPL-MCNC: 93 UG/ML (ref 50–100)
VIT B12 SERPL-MCNC: 447 PG/ML (ref 180–914)
WBC # BLD AUTO: 6.73 THOUSAND/UL (ref 4.31–10.16)

## 2023-08-03 PROCEDURE — 85025 COMPLETE CBC W/AUTO DIFF WBC: CPT

## 2023-08-03 PROCEDURE — 36415 COLL VENOUS BLD VENIPUNCTURE: CPT

## 2023-08-03 PROCEDURE — 80164 ASSAY DIPROPYLACETIC ACD TOT: CPT

## 2023-08-03 PROCEDURE — 80053 COMPREHEN METABOLIC PANEL: CPT

## 2023-08-03 PROCEDURE — 82306 VITAMIN D 25 HYDROXY: CPT

## 2023-08-03 PROCEDURE — 82607 VITAMIN B-12: CPT

## 2023-08-03 PROCEDURE — 83036 HEMOGLOBIN GLYCOSYLATED A1C: CPT

## 2023-08-03 NOTE — TELEPHONE ENCOUNTER
Pt received a call from Central scheduling to schedule MRI lumbar spine however she said it was suppose to be of the C-spine. Aware out of office until Monday okay to wait. Updated order can be sent to [facility] central scheduling.

## 2023-08-07 DIAGNOSIS — M50.30 DDD (DEGENERATIVE DISC DISEASE), CERVICAL: ICD-10-CM

## 2023-08-07 DIAGNOSIS — R73.09 ABNORMAL GLUCOSE: ICD-10-CM

## 2023-08-07 DIAGNOSIS — M47.12 CERVICAL SPONDYLOSIS WITH MYELOPATHY: Primary | ICD-10-CM

## 2023-08-08 DIAGNOSIS — F31.81 MODERATE BIPOLAR II DISORDER, MAJOR DEPRESSIVE EPISODE, IN FULL REMISSION, WITH MIXED FEATURES (HCC): Primary | ICD-10-CM

## 2023-08-08 RX ORDER — DIVALPROEX SODIUM 500 MG/1
500 TABLET, DELAYED RELEASE ORAL DAILY
Qty: 180 TABLET | Refills: 3 | Status: SHIPPED | OUTPATIENT
Start: 2023-08-08

## 2023-08-08 RX ORDER — QUETIAPINE FUMARATE 300 MG/1
300 TABLET, FILM COATED ORAL
Qty: 90 TABLET | Refills: 3 | Status: SHIPPED | OUTPATIENT
Start: 2023-08-08

## 2023-08-10 ENCOUNTER — TELEPHONE (OUTPATIENT)
Dept: FAMILY MEDICINE CLINIC | Facility: CLINIC | Age: 63
End: 2023-08-10

## 2023-08-10 DIAGNOSIS — F31.81 MODERATE BIPOLAR II DISORDER, MAJOR DEPRESSIVE EPISODE, IN FULL REMISSION, WITH MIXED FEATURES (HCC): ICD-10-CM

## 2023-08-10 RX ORDER — DIVALPROEX SODIUM 500 MG/1
TABLET, DELAYED RELEASE ORAL
Qty: 180 TABLET | Refills: 3 | Status: CANCELLED | OUTPATIENT
Start: 2023-08-10

## 2023-08-15 ENCOUNTER — HOSPITAL ENCOUNTER (OUTPATIENT)
Dept: MRI IMAGING | Facility: HOSPITAL | Age: 63
Discharge: HOME | End: 2023-08-15
Attending: FAMILY MEDICINE
Payer: COMMERCIAL

## 2023-08-15 DIAGNOSIS — M50.30 DDD (DEGENERATIVE DISC DISEASE), CERVICAL: ICD-10-CM

## 2023-08-15 DIAGNOSIS — M47.12 CERVICAL SPONDYLOSIS WITH MYELOPATHY: ICD-10-CM

## 2023-08-15 PROCEDURE — G1004 CDSM NDSC: HCPCS

## 2023-08-15 PROCEDURE — A9585 GADOBUTROL INJECTION: HCPCS | Performed by: FAMILY MEDICINE

## 2023-08-15 PROCEDURE — 72156 MRI NECK SPINE W/O & W/DYE: CPT

## 2023-08-15 RX ORDER — GADOBUTROL 604.72 MG/ML
8 INJECTION INTRAVENOUS
Status: COMPLETED | OUTPATIENT
Start: 2023-08-15 | End: 2023-08-15

## 2023-08-15 RX ADMIN — GADOBUTROL 8 ML: 604.72 INJECTION INTRAVENOUS at 17:57

## 2023-08-21 NOTE — RESULT ENCOUNTER NOTE
NTN- Most notable findings are at C4/5. [facility] will address, we can medically manage with meds until then.

## 2023-08-22 ENCOUNTER — OFFICE VISIT (OUTPATIENT)
Dept: FAMILY MEDICINE CLINIC | Facility: CLINIC | Age: 63
End: 2023-08-22
Payer: COMMERCIAL

## 2023-08-22 VITALS
DIASTOLIC BLOOD PRESSURE: 84 MMHG | HEART RATE: 82 BPM | TEMPERATURE: 98.6 F | SYSTOLIC BLOOD PRESSURE: 136 MMHG | BODY MASS INDEX: 35.19 KG/M2 | HEIGHT: 61 IN | OXYGEN SATURATION: 98 % | WEIGHT: 186.4 LBS

## 2023-08-22 DIAGNOSIS — M47.12 CERVICAL SPONDYLOSIS WITH MYELOPATHY: Primary | ICD-10-CM

## 2023-08-22 DIAGNOSIS — M50.30 DDD (DEGENERATIVE DISC DISEASE), CERVICAL: ICD-10-CM

## 2023-08-22 PROCEDURE — 99212 OFFICE O/P EST SF 10 MIN: CPT | Performed by: FAMILY MEDICINE

## 2023-08-22 PROCEDURE — 96372 THER/PROPH/DIAG INJ SC/IM: CPT

## 2023-08-22 RX ORDER — KETOROLAC TROMETHAMINE 30 MG/ML
60 INJECTION, SOLUTION INTRAMUSCULAR; INTRAVENOUS ONCE
Status: COMPLETED | OUTPATIENT
Start: 2023-08-22 | End: 2023-08-22

## 2023-08-22 RX ORDER — DEXAMETHASONE SODIUM PHOSPHATE 10 MG/ML
10 INJECTION INTRAMUSCULAR; INTRAVENOUS ONCE
Status: COMPLETED | OUTPATIENT
Start: 2023-08-22 | End: 2023-08-22

## 2023-08-22 RX ORDER — CELECOXIB 400 MG/1
400 CAPSULE ORAL DAILY
Qty: 30 CAPSULE | Refills: 5 | Status: SHIPPED | OUTPATIENT
Start: 2023-08-22

## 2023-08-22 RX ADMIN — KETOROLAC TROMETHAMINE 60 MG: 30 INJECTION, SOLUTION INTRAMUSCULAR; INTRAVENOUS at 14:51

## 2023-08-22 RX ADMIN — DEXAMETHASONE SODIUM PHOSPHATE 10 MG: 10 INJECTION INTRAMUSCULAR; INTRAVENOUS at 14:50

## 2023-08-22 NOTE — ASSESSMENT & PLAN NOTE
MRI findings reviewed with patient today. Await Ortho input mid-September. In the meantime, Celebrex added daily in the morning with Decadron and Toradol injections IM today. Can continue Ultram as needed pain plus Robaxin as needed. Significant side effects to Lyrica and Neurontin. Patient will contact me between now and her Ortho appointment if the pain persists.

## 2023-08-22 NOTE — PROGRESS NOTES
Name: Miguel Lovett      :       MRN: [id number]  Encounter Provider: Selam Coleman DO  Encounter Date: 2023   Encounter department: [address] [facility]    Assessment & Plan     1. Cervical spondylosis with myelopathy  Assessment & Plan:  MRI findings reviewed with patient today. Await Ortho input mid-September. In the meantime, Celebrex added daily in the morning with Decadron and Toradol injections IM today. Can continue Ultram as needed pain plus Robaxin as needed. Significant side effects to Lyrica and Neurontin. Patient will contact me between now and her Ortho appointment if the pain persists. Orders:  -     celecoxib (CeleBREX) 400 MG capsule; Take 1 capsule (400 mg total) by mouth daily    2. DDD (degenerative disc disease), cervical  -     ketorolac (TORADOL) 60 mg/2 mL IM injection 60 mg  -     dexamethasone (DECADRON) injection 10 mg        Tobacco Cessation Counseling: The patient is sincerely urged to quit consumption of tobacco. She is not ready to quit tobacco.         Subjective      Neck pain with radiculopathy- Pain progressive Recent MRI C spine: Disc osteophyte complex at the C4-C5 transitional level results in mild to moderate canal stenosis with near abutment of the cord. Moderate to severe bilateral foraminal narrowing is present at this level. Has appt with ortho/[facility]  (Dr. Ben Winter). He performed C5-7 spinal fusion almost 10yrs ago. Taking robaxin before work and then a flexeril when she gets home. Taking ultram but fairly ineffective. No anti inflam's currently. Gabapentin and lyria both caused sig fatigue, grogginess and felt they interacted with psych meds. Review of Systems   Constitutional: Positive for activity change. Negative for appetite change and fever. Musculoskeletal: Positive for arthralgias, neck pain and neck stiffness. Negative for joint swelling.    Psychiatric/Behavioral: Positive for decreased concentration and sleep disturbance. Negative for confusion. Current Outpatient Medications on File Prior to Visit   Medication Sig   • divalproex sodium (Depakote) 500 mg DR tablet Take 1 tablet (500 mg total) by mouth in the morning Pt takes 2 tabs @ HS   • hydrOXYzine HCL (ATARAX) 25 mg tablet Take 25 mg by mouth daily as needed   • methocarbamol (Robaxin-750) 750 mg tablet Take 1 tablet (750 mg total) by mouth every 6 (six) hours as needed for muscle spasms   • pantoprazole (PROTONIX) 40 mg tablet Take 40 mg by mouth 2 (two) times a day   • QUEtiapine (SEROquel) 300 mg tablet Take 1 tablet (300 mg total) by mouth daily at bedtime   • traMADol (Ultram) 50 mg tablet Take 1 tablet (50 mg total) by mouth every 6 (six) hours as needed for moderate pain   • Semaglutide-Weight Management (WEGOVY) 0.25 MG/0.5ML Inject 0.5 mL (0.25 mg total) under the skin once a week for 4 doses (Patient not taking: Reported on 8/22/2023)       Objective     /84 (BP Location: Left arm, Patient Position: Sitting, Cuff Size: Adult)   Pulse 82   Temp 98.6 °F (37 °C) (Tympanic)   Ht 5' 0.5" (1.537 m)   Wt 84.6 kg (186 lb 6.4 oz)   SpO2 98%   BMI 35.80 kg/m²     Physical Exam  Vitals and nursing note reviewed. Musculoskeletal:      Cervical back: Decreased range of motion. Neurological:      General: No focal deficit present. Cranial Nerves: No cranial nerve deficit. Psychiatric:         Mood and Affect: Mood normal.         Behavior: Behavior normal.         Thought Content:  Thought content normal.         Judgment: Judgment normal.       Junior Bates DO

## 2023-08-30 DIAGNOSIS — B37.9 YEAST INFECTION: Primary | ICD-10-CM

## 2023-08-30 RX ORDER — FLUCONAZOLE 150 MG/1
150 TABLET ORAL ONCE
Qty: 1 TABLET | Refills: 1 | Status: SHIPPED | OUTPATIENT
Start: 2023-08-30 | End: 2023-08-30

## 2023-08-31 ENCOUNTER — TELEPHONE (OUTPATIENT)
Dept: FAMILY MEDICINE CLINIC | Facility: CLINIC | Age: 63
End: 2023-08-31

## 2023-08-31 ENCOUNTER — CLINICAL SUPPORT (OUTPATIENT)
Dept: FAMILY MEDICINE CLINIC | Facility: CLINIC | Age: 63
End: 2023-08-31
Payer: COMMERCIAL

## 2023-08-31 DIAGNOSIS — M47.12 CERVICAL SPONDYLOSIS WITH MYELOPATHY: Primary | ICD-10-CM

## 2023-08-31 PROCEDURE — 96372 THER/PROPH/DIAG INJ SC/IM: CPT

## 2023-08-31 RX ORDER — KETOROLAC TROMETHAMINE 30 MG/ML
60 INJECTION, SOLUTION INTRAMUSCULAR; INTRAVENOUS ONCE
Status: COMPLETED | OUTPATIENT
Start: 2023-08-31 | End: 2023-08-31

## 2023-08-31 RX ADMIN — KETOROLAC TROMETHAMINE 60 MG: 30 INJECTION, SOLUTION INTRAMUSCULAR; INTRAVENOUS at 16:31

## 2023-09-08 ENCOUNTER — TELEPHONE (OUTPATIENT)
Dept: FAMILY MEDICINE CLINIC | Facility: CLINIC | Age: 63
End: 2023-09-08

## 2023-09-08 ENCOUNTER — CLINICAL SUPPORT (OUTPATIENT)
Dept: FAMILY MEDICINE CLINIC | Facility: CLINIC | Age: 63
End: 2023-09-08
Payer: COMMERCIAL

## 2023-09-08 DIAGNOSIS — M54.2 NECK PAIN: Primary | ICD-10-CM

## 2023-09-08 DIAGNOSIS — M50.30 DDD (DEGENERATIVE DISC DISEASE), CERVICAL: Primary | ICD-10-CM

## 2023-09-08 PROCEDURE — 96372 THER/PROPH/DIAG INJ SC/IM: CPT

## 2023-09-08 RX ORDER — KETOROLAC TROMETHAMINE 30 MG/ML
60 INJECTION, SOLUTION INTRAMUSCULAR; INTRAVENOUS ONCE
Status: COMPLETED | OUTPATIENT
Start: 2023-09-08 | End: 2023-09-08

## 2023-09-08 RX ADMIN — KETOROLAC TROMETHAMINE 60 MG: 30 INJECTION, SOLUTION INTRAMUSCULAR; INTRAVENOUS at 13:21

## 2023-09-12 ENCOUNTER — TELEPHONE (OUTPATIENT)
Dept: FAMILY MEDICINE CLINIC | Facility: CLINIC | Age: 63
End: 2023-09-12

## 2023-09-12 ENCOUNTER — TELEPHONE (OUTPATIENT)
Dept: NEUROSURGERY | Facility: CLINIC | Age: 63
End: 2023-09-12

## 2023-09-12 DIAGNOSIS — M47.12 CERVICAL SPONDYLOSIS WITH MYELOPATHY: Primary | ICD-10-CM

## 2023-09-13 ENCOUNTER — OFFICE VISIT (OUTPATIENT)
Dept: FAMILY MEDICINE CLINIC | Facility: CLINIC | Age: 63
End: 2023-09-13
Payer: COMMERCIAL

## 2023-09-13 VITALS
TEMPERATURE: 96.1 F | HEIGHT: 65 IN | DIASTOLIC BLOOD PRESSURE: 82 MMHG | BODY MASS INDEX: 31.19 KG/M2 | SYSTOLIC BLOOD PRESSURE: 152 MMHG | WEIGHT: 187.2 LBS

## 2023-09-13 DIAGNOSIS — M50.30 DDD (DEGENERATIVE DISC DISEASE), CERVICAL: ICD-10-CM

## 2023-09-13 DIAGNOSIS — M47.12 CERVICAL SPONDYLOSIS WITH MYELOPATHY: Primary | ICD-10-CM

## 2023-09-13 DIAGNOSIS — M54.2 NECK PAIN: ICD-10-CM

## 2023-09-13 PROCEDURE — 99213 OFFICE O/P EST LOW 20 MIN: CPT | Performed by: FAMILY MEDICINE

## 2023-09-13 PROCEDURE — 96372 THER/PROPH/DIAG INJ SC/IM: CPT

## 2023-09-13 RX ORDER — DEXAMETHASONE SODIUM PHOSPHATE 10 MG/ML
10 INJECTION INTRAMUSCULAR; INTRAVENOUS ONCE
Status: COMPLETED | OUTPATIENT
Start: 2023-09-13 | End: 2023-09-13

## 2023-09-13 RX ORDER — PREGABALIN 75 MG/1
75 CAPSULE ORAL 2 TIMES DAILY
Qty: 60 CAPSULE | Refills: 1 | Status: SHIPPED | OUTPATIENT
Start: 2023-09-13

## 2023-09-13 RX ORDER — KETOROLAC TROMETHAMINE 30 MG/ML
60 INJECTION, SOLUTION INTRAMUSCULAR; INTRAVENOUS ONCE
Status: COMPLETED | OUTPATIENT
Start: 2023-09-13 | End: 2023-09-13

## 2023-09-13 RX ADMIN — DEXAMETHASONE SODIUM PHOSPHATE 10 MG: 10 INJECTION INTRAMUSCULAR; INTRAVENOUS at 14:37

## 2023-09-13 RX ADMIN — KETOROLAC TROMETHAMINE 60 MG: 30 INJECTION, SOLUTION INTRAMUSCULAR; INTRAVENOUS at 14:38

## 2023-09-13 NOTE — PROGRESS NOTES
Name: Beau Beltran      : [birth year]      MRN: [id number]  Encounter Provider: Moy Rizvi DO  Encounter Date: 2023   Encounter department: [address] [facility]    Assessment & Plan     1. Cervical spondylosis with myelopathy  -     Ambulatory referral to Spine & Pain Management; Future  -     dexamethasone (DECADRON) injection 10 mg  -     ketorolac (TORADOL) 60 mg/2 mL IM injection 60 mg  -     pregabalin (LYRICA) 75 mg capsule; Take 1 capsule (75 mg total) by mouth 2 (two) times a day    2. DDD (degenerative disc disease), cervical  -     Ambulatory referral to Spine & Pain Management; Future    3. Neck pain  Assessment & Plan:  Continue Celebrex daily, Robaxin during the work hours and then Flexeril at bedtime. Added low-dose Lyrica, start 75 mg at bedtime. After 3 to 4 days patient can increase to twice daily. Reviewed potential side effects. Repeat Decadron and Toradol injections IM today. Pain management referral.  Waiting to hear from neurosurgery. Tobacco Cessation Counseling: The patient is sincerely urged to quit consumption of tobacco. She is not ready to quit tobacco.         Subjective      Neck pain-- increasing neck pain, cracking repeatedly; [facility] appt was cancelled and cannot reschedule until mid to late Oct. But has neurosurgical consult pending. Wiating to hear back by the end of the week re: appt. Toradol no longer helps. Toradol and decadron combo helps for a few days. Have not consulted pain management since  prior to her 1st surgery. Taking Celebrex daily plus robaxin during the day and flexeril at HS. Gabapentin cause too much fatigue and "hang over" affect. Review of Systems   Constitutional: Positive for appetite change. Negative for activity change. Musculoskeletal: Positive for back pain, neck pain and neck stiffness. Neurological: Positive for weakness, numbness and headaches. Negative for dizziness.        Current Outpatient Medications on File Prior to Visit   Medication Sig   • celecoxib (CeleBREX) 400 MG capsule Take 1 capsule (400 mg total) by mouth daily   • divalproex sodium (Depakote) 500 mg DR tablet Take 1 tablet (500 mg total) by mouth in the morning Pt takes 2 tabs @ HS   • hydrOXYzine HCL (ATARAX) 25 mg tablet Take 25 mg by mouth daily as needed   • methocarbamol (Robaxin-750) 750 mg tablet Take 1 tablet (750 mg total) by mouth every 6 (six) hours as needed for muscle spasms   • pantoprazole (PROTONIX) 40 mg tablet Take 40 mg by mouth 2 (two) times a day   • QUEtiapine (SEROquel) 300 mg tablet Take 1 tablet (300 mg total) by mouth daily at bedtime   • [DISCONTINUED] traMADol (Ultram) 50 mg tablet Take 1 tablet (50 mg total) by mouth every 6 (six) hours as needed for moderate pain (Patient not taking: Reported on 9/13/2023)       Objective     /82 (BP Location: Left arm, Patient Position: Sitting, Cuff Size: Adult)   Temp (!) 96.1 °F (35.6 °C) (Tympanic)   Ht 5' 5" (1.651 m)   Wt 84.9 kg (187 lb 3.2 oz)   BMI 31.15 kg/m²     Physical Exam  Vitals and nursing note reviewed. Constitutional:       Appearance: Normal appearance. She is normal weight. Musculoskeletal:      Cervical back: Spasms and tenderness present. Decreased range of motion. Neurological:      General: No focal deficit present. Mental Status: She is alert. Psychiatric:         Mood and Affect: Mood normal.         Thought Content:  Thought content normal.         Judgment: Judgment normal.       Rosenda Young DO

## 2023-09-13 NOTE — ASSESSMENT & PLAN NOTE
Continue Celebrex daily, Robaxin during the work hours and then Flexeril at bedtime. Added low-dose Lyrica, start 75 mg at bedtime. After 3 to 4 days patient can increase to twice daily. Reviewed potential side effects. Repeat Decadron and Toradol injections IM today. Pain management referral.  Waiting to hear from neurosurgery.

## 2023-09-14 ENCOUNTER — TELEPHONE (OUTPATIENT)
Dept: FAMILY MEDICINE CLINIC | Facility: CLINIC | Age: 63
End: 2023-09-14

## 2023-09-14 RX ORDER — CYCLOBENZAPRINE HCL 10 MG
10 TABLET ORAL 3 TIMES DAILY PRN
COMMUNITY

## 2023-09-21 PROBLEM — Z98.1 HX OF FUSION OF CERVICAL SPINE: Status: ACTIVE | Noted: 2023-09-21

## 2023-09-21 NOTE — ASSESSMENT & PLAN NOTE
New evaluation of neck pain with BUE paresthesias   · S/p C5-7 ACDF at [address] [place] [address] in 2014 by Dr. Jyothi Reynolds   · Worsening neck pain, stiffness. Worse with cracking of neck. Also has stiffness if in the same position for long periods. Pain radiates to bilateral shoulders, L>R with focal tenderness to left scapula (unchanged since prior to last surgery). No fine motor issues, BBI, balance issues. · Exam: midline neck and left trapezius, focal left scapula TTP. 5/5 throughout, LT intact, 2+ DTRs, no De La Garza's/clonus, intact rapid finger movements, no drift,normal finger-to-nose, intact tandem walking. Imaging:  · MRI cervical 8/15/23: Postsurgical changes and multilevel cervical spondylosis, as described above. Disc osteophyte complex at the C4-C5 transitional level results in mild to moderate canal stenosis with near abutment of the cord. Moderate to severe bilateral foraminal narrowing is present at this level. No abnormal enhancement within the cervical spinal canal.  · XR cervical 4/15/23: Disc space narrowing C3-4 and C4-5 with exuberant spurring at the   lateral location in this patient status post anterior fusion C5-C7    Plan:  · Reviewed imaging with patient, , Dr. Bird Brown. Her hardware appears stable on recent x-ray. MRI does show some adjacent level disease at C4-5 with canal and foraminal stenosis. · She is not myelopathic on exam. She has not tried PT or injections since symptoms recurred. · No neurosurgical intervention recommended at this time. · Recommend trial of conservative measures. Referral placed to PT. · She already has a referral to pain management, agree with this. · Follow-up in 3 months with upright cervical XR including flexion/extension views to evaluate for any dynamic instability as joint appointment with Dr. Bird Brown. · Call sooner with any questions or concerns.

## 2023-09-22 ENCOUNTER — OFFICE VISIT (OUTPATIENT)
Dept: NEUROSURGERY | Facility: CLINIC | Age: 63
End: 2023-09-22
Payer: COMMERCIAL

## 2023-09-22 VITALS
RESPIRATION RATE: 16 BRPM | HEART RATE: 83 BPM | HEIGHT: 61 IN | WEIGHT: 188 LBS | SYSTOLIC BLOOD PRESSURE: 118 MMHG | OXYGEN SATURATION: 99 % | DIASTOLIC BLOOD PRESSURE: 80 MMHG | TEMPERATURE: 99.3 F | BODY MASS INDEX: 35.5 KG/M2

## 2023-09-22 DIAGNOSIS — Z98.1 HX OF FUSION OF CERVICAL SPINE: Primary | ICD-10-CM

## 2023-09-22 DIAGNOSIS — M54.2 NECK PAIN: ICD-10-CM

## 2023-09-22 PROCEDURE — 99244 OFF/OP CNSLTJ NEW/EST MOD 40: CPT | Performed by: NEUROLOGICAL SURGERY

## 2023-09-22 RX ORDER — CELECOXIB 200 MG/1
200 CAPSULE ORAL DAILY
COMMUNITY
Start: 2023-08-22

## 2023-09-22 NOTE — PROGRESS NOTES
Neurosurgery Office Note  Lauren Bains 66 y.o. female MRN: [id number]      Assessment/Plan     Hx of fusion of cervical spine  New evaluation of neck pain with BUE paresthesias   · S/p C5-7 ACDF at [facility] in 2014 by Dr. Nuzhat Montes   · Worsening neck pain, stiffness. Worse with cracking of neck. Also has stiffness if in the same position for long periods. Pain radiates to bilateral shoulders, L>R with focal tenderness to left scapula (unchanged since prior to last surgery). No fine motor issues, BBI, balance issues. · Exam: midline neck and left trapezius, focal left scapula TTP. 5/5 throughout, LT intact, 2+ DTRs, no De La Garza's/clonus, intact rapid finger movements, no drift,normal finger-to-nose, intact tandem walking. Imaging:  · MRI cervical 8/15/23: Postsurgical changes and multilevel cervical spondylosis, as described above. Disc osteophyte complex at the C4-C5 transitional level results in mild to moderate canal stenosis with near abutment of the cord. Moderate to severe bilateral foraminal narrowing is present at this level. No abnormal enhancement within the cervical spinal canal.  · XR cervical 4/15/23: Disc space narrowing C3-4 and C4-5 with exuberant spurring at the   lateral location in this patient status post anterior fusion C5-C7    Plan:  · Reviewed imaging with patient, , Dr. Constantino Azevedo. Her hardware appears stable on recent x-ray. MRI does show some adjacent level disease at C4-5 with canal and foraminal stenosis. · She is not myelopathic on exam. She has not tried PT or injections since symptoms recurred. · No neurosurgical intervention recommended at this time. · Recommend trial of conservative measures. Referral placed to PT. · She already has a referral to pain management, agree with this. · Follow-up in 3 months with upright cervical XR including flexion/extension views to evaluate for any dynamic instability as joint appointment with Dr. Constantino Azevedo.   · Call sooner with any questions or concerns. Diagnoses and all orders for this visit:    Hx of fusion of cervical spine  -     Ambulatory Referral to Physical Therapy; Future  -     XR spine cervical complete 6+ vw flex/ext/obl; Future    Neck pain  -     Ambulatory Referral to Physical Therapy; Future  -     XR spine cervical complete 6+ vw flex/ext/obl; Future    Other orders  -     celecoxib (CeleBREX) 200 mg capsule; Take 200 mg by mouth daily          I have spent a total time of 40 minutes on 09/22/23 in caring for this patient including Diagnostic results, Instructions for management, Patient and family education, Impressions, Counseling / Coordination of care, Documenting in the medical record, Reviewing / ordering tests, medicine, procedures  , Obtaining or reviewing history   and Communicating with other healthcare professionals . CHIEF COMPLAINT    Chief Complaint   Patient presents with   • Consult     Cervical spine evaluation, h/o ACDF, with MRI and X-rays       HISTORY    History of Present Illness     66y.o. year old female     42-year-old female seen for new evaluation of neck pain with upper extremity paresthesias. She is status post C5-7 ACDF in 2014 at [facility] by Dr. Faiza Acosta. Prior to surgery she was experiencing pain, numbness, tingling in her neck and arms with myelopathy. She has done well until about 6 months ago she started noticing worsening in her symptoms. She complains of 4-5 neck pain at baseline, at worst is 8-9/10. Made worse with cracking. She hears and feels a cracking/grinding sound in her neck. If she keeps her head in the same position for too long, she notes increased neck stiffness. Pain radiates out to both shoulders, worse on the left with focal tenderness near the left scapula. This is unchanged from prior to surgery. She has intermittent tingling down the lateral aspect of both arms to all fingers, worse on the left. Unsure of any triggers for the tingling sensation. Right-hand-dominant.   No issues with fine motor skills. Balance is okay, no BBI. She does note that the neck pain radiates up into the posterior head causing a headache. She is smoking 5 cigarettes/day. Takes Robaxin, Celebrex, Flexeril, Lyrica without significant relief of symptoms. Also does take Aleve at times. She has not tried PT recently or any injections since surgery. See Discussion    REVIEW OF SYSTEMS    Review of Systems   HENT: Negative for trouble swallowing. Respiratory: Negative for chest tightness and shortness of breath. Gastrointestinal: Negative. Genitourinary: Negative. Musculoskeletal: Positive for myalgias (left arm), neck pain (and across B/L shoulder blades) and neck stiffness. Negative for gait problem. Neck "cracks" very frequently, feels grinding   Skin: Negative. Neurological: Positive for numbness (N/T B/L arms intermittent) and headaches (radiating up from neck). Negative for seizures and weakness. Hematological: Does not bruise/bleed easily. Psychiatric/Behavioral: Negative for sleep disturbance. ROS obtained by MA. Reviewed. See HPI.      Meds/Allergies     Current Outpatient Medications   Medication Sig Dispense Refill   • celecoxib (CeleBREX) 200 mg capsule Take 200 mg by mouth daily     • cyclobenzaprine (FLEXERIL) 10 mg tablet Take 10 mg by mouth 3 (three) times a day as needed     • divalproex sodium (Depakote) 500 mg DR tablet Take 1 tablet (500 mg total) by mouth in the morning Pt takes 2 tabs @  tablet 3   • methocarbamol (Robaxin-750) 750 mg tablet Take 1 tablet (750 mg total) by mouth every 6 (six) hours as needed for muscle spasms 30 tablet 3   • pantoprazole (PROTONIX) 40 mg tablet Take 40 mg by mouth 2 (two) times a day     • pregabalin (LYRICA) 75 mg capsule Take 1 capsule (75 mg total) by mouth 2 (two) times a day (Patient taking differently: Take 75 mg by mouth daily) 60 capsule 1   • QUEtiapine (SEROquel) 300 mg tablet Take 1 tablet (300 mg total) by mouth daily at bedtime 90 tablet 3   • celecoxib (CeleBREX) 400 MG capsule Take 1 capsule (400 mg total) by mouth daily (Patient not taking: Reported on 9/22/2023) 30 capsule 5   • hydrOXYzine HCL (ATARAX) 25 mg tablet Take 25 mg by mouth daily as needed       No current facility-administered medications for this visit. No Known Allergies    PAST HISTORY    Past Medical History:   Diagnosis Date   • Anxiety    • Bipolar disorder ([address])    • Depression    • GERD (gastroesophageal reflux disease)    • Headache     after anesthesia   • Hx of fusion of cervical spine 9/21/2023   • Hyperlipidemia    • migraines    • Obesity    • Wears glasses        Past Surgical History:   Procedure Laterality Date   • CYST REMOVAL Right     hand   • CYSTOSCOPY N/A 02/08/2017    Procedure: CYSTOSCOPY;  Surgeon: Cristofer Mayo MD;  Location: [facility];  Service:    • EXAMINATION UNDER ANESTHESIA N/A 02/08/2017    Procedure: EXAM UNDER ANESTHESIA, RESECTION OF VAGINAL GRAFT;  Surgeon: Cristofer Mayo MD;  Location: [facility];  Service:    • HYSTERECTOMY     • UT CYSTOURETHROSCOPY N/A 09/27/2017    Procedure: CYSTOSCOPY;  Surgeon: Cristofer Mayo MD;  Location: [facility];  Service: UroGynecology          • UT REVJ/RMVL PROSTHETIC VAGINAL GRAFT VAGINAL JANI N/A 09/27/2017    Procedure: RESECTION OF EXPOSED MESH VAGINAL AND PARAVAGINAL TISSUE;  Surgeon: Cristofer Mayo MD;  Location: [facility];  Service: UroGynecology          • SPINAL FUSION      C5-C6 and C6-C7   • TONSILLECTOMY     • TOTAL VAGINAL HYSTERECTOMY     • TUBAL LIGATION         Social History     Tobacco Use   • Smoking status: Every Day     Packs/day: 0.25     Years: 45.00     Total pack years: 11.25     Types: Cigarettes   • Smokeless tobacco: Never   Vaping Use   • Vaping Use: Every day   • Substances: THC   Substance Use Topics   • Alcohol use:  Yes     Alcohol/week: 10.0 standard drinks of alcohol     Types: 10 Glasses of wine per week     Comment: socially   • Drug use: Yes     Frequency: 7.0 times per week     Types: Marijuana     Comment: Medical Marijuana       Family History   Problem Relation Age of Onset   • Pseudochol deficiency Mother    • COPD Mother         Pseudomonas of the lungs   • Depression Mother    • Alcohol abuse Father    • Cancer Father          Above history personally reviewed. EXAM    Vitals:Blood pressure 118/80, pulse 83, temperature 99.3 °F (37.4 °C), temperature source Temporal, resp. rate 16, height 5' 1" (1.549 m), weight 85.3 kg (188 lb), SpO2 99 %, not currently breastfeeding. ,Body mass index is 35.52 kg/m². Physical Exam  Vitals reviewed. Constitutional:       General: She is awake. Appearance: Normal appearance. HENT:      Head: Normocephalic and atraumatic. Eyes:      Conjunctiva/sclera: Conjunctivae normal.   Neck:      Comments: Midline neck TTP, left trapezius TTP with focal area of tenderness near scapula. Pain with ROM  Cardiovascular:      Rate and Rhythm: Normal rate. Pulmonary:      Effort: Pulmonary effort is normal.   Skin:     General: Skin is warm and dry. Neurological:      Mental Status: She is alert and oriented to person, place, and time. Motor: Motor strength is normal.     Coordination: Finger-Nose-Finger Test normal.      Gait: Gait is intact. Tandem walk normal.      Deep Tendon Reflexes:      Reflex Scores:       Bicep reflexes are 2+ on the right side and 2+ on the left side. Brachioradialis reflexes are 2+ on the right side and 2+ on the left side. Patellar reflexes are 2+ on the right side and 2+ on the left side. Achilles reflexes are 2+ on the right side and 2+ on the left side. Psychiatric:         Attention and Perception: Attention and perception normal.         Mood and Affect: Mood and affect normal.         Speech: Speech normal.         Behavior: Behavior normal. Behavior is cooperative. Thought Content:  Thought content normal.         Cognition and Memory: Cognition and memory normal.         Judgment: Judgment normal.         Neurologic Exam     Mental Status   Oriented to person, place, and time. Follows 2 step commands. Attention: normal. Concentration: normal.   Speech: speech is normal   Level of consciousness: alert  Knowledge: good. Normal comprehension. Cranial Nerves     CN XI   Right trapezius strength: normal  Left trapezius strength: normal    Motor Exam   Muscle bulk: normal  Overall muscle tone: normal  Right arm pronator drift: absent  Left arm pronator drift: absent    Strength   Strength 5/5 throughout. Sensory Exam   Right arm light touch: normal  Left arm light touch: normal  Right leg light touch: normal  Left leg light touch: normal    Gait, Coordination, and Reflexes     Gait  Gait: normal    Coordination   Finger to nose coordination: normal  Tandem walking coordination: normal    Tremor   Resting tremor: absent  Intention tremor: absent  Action tremor: absent    Reflexes   Right brachioradialis: 2+  Left brachioradialis: 2+  Right biceps: 2+  Left biceps: 2+  Right patellar: 2+  Left patellar: 2+  Right achilles: 2+  Left achilles: 2+  Right De La Garza: absent  Left De La Garza: absent  Right ankle clonus: absent  Left ankle clonus: absent  Intact rapid finger movements         MEDICAL DECISION MAKING    Imaging Studies:     No results found. I have personally reviewed pertinent reports.    and I have personally reviewed pertinent films in PACS

## 2023-09-29 ENCOUNTER — RA CDI HCC (OUTPATIENT)
Dept: OTHER | Facility: HOSPITAL | Age: 63
End: 2023-09-29

## 2023-09-29 NOTE — PROGRESS NOTES
[facility] [address] [facility] coding opportunities       Chart reviewed, no opportunity found: CHART REVIEWED, NO OPPORTUNITY FOUND        Patients Insurance        Commercial Insurance: [employer] Sterling

## 2023-10-06 ENCOUNTER — OFFICE VISIT (OUTPATIENT)
Dept: FAMILY MEDICINE CLINIC | Facility: CLINIC | Age: 63
End: 2023-10-06
Payer: COMMERCIAL

## 2023-10-06 VITALS
WEIGHT: 190 LBS | BODY MASS INDEX: 34.96 KG/M2 | TEMPERATURE: 97.4 F | DIASTOLIC BLOOD PRESSURE: 88 MMHG | SYSTOLIC BLOOD PRESSURE: 148 MMHG | HEIGHT: 62 IN

## 2023-10-06 DIAGNOSIS — F17.200 TOBACCO DEPENDENCE: ICD-10-CM

## 2023-10-06 DIAGNOSIS — M47.12 CERVICAL SPONDYLOSIS WITH MYELOPATHY: Primary | ICD-10-CM

## 2023-10-06 DIAGNOSIS — M50.30 DDD (DEGENERATIVE DISC DISEASE), CERVICAL: ICD-10-CM

## 2023-10-06 DIAGNOSIS — M54.2 NECK PAIN: ICD-10-CM

## 2023-10-06 DIAGNOSIS — Z23 ENCOUNTER FOR IMMUNIZATION: ICD-10-CM

## 2023-10-06 DIAGNOSIS — K21.9 GASTROESOPHAGEAL REFLUX DISEASE WITHOUT ESOPHAGITIS: ICD-10-CM

## 2023-10-06 PROCEDURE — 90471 IMMUNIZATION ADMIN: CPT

## 2023-10-06 PROCEDURE — 90686 IIV4 VACC NO PRSV 0.5 ML IM: CPT

## 2023-10-06 PROCEDURE — 99213 OFFICE O/P EST LOW 20 MIN: CPT | Performed by: FAMILY MEDICINE

## 2023-10-06 RX ORDER — PANTOPRAZOLE SODIUM 40 MG/1
40 TABLET, DELAYED RELEASE ORAL 2 TIMES DAILY
Qty: 90 TABLET | Refills: 3 | Status: SHIPPED | OUTPATIENT
Start: 2023-10-06

## 2023-10-06 NOTE — ASSESSMENT & PLAN NOTE
Symptoms have improved since last office visit. Holding on PT and pain management referrals at this time. Continued use of alternating Flexeril and Robaxin muscle relaxers plus Celebrex plus Lyrica. Also using a topical capsaicin product. If symptoms were to worsen consider PT and/or pain management. We will follow-up as needed at this point.

## 2023-10-06 NOTE — PROGRESS NOTES
Name: Fred Rodríguez      :       MRN: [id number]  Encounter Provider: Alessio Ziegler DO  Encounter Date: 10/6/2023   Encounter department: [address] [facility]    Assessment & Plan     1. Cervical spondylosis with myelopathy  Assessment & Plan:  Symptoms have improved since last office visit. Holding on PT and pain management referrals at this time. Continued use of alternating Flexeril and Robaxin muscle relaxers plus Celebrex plus Lyrica. Also using a topical capsaicin product. If symptoms were to worsen consider PT and/or pain management. We will follow-up as needed at this point. 2. DDD (degenerative disc disease), cervical    3. Neck pain    4. Gastroesophageal reflux disease without esophagitis  -     pantoprazole (PROTONIX) 40 mg tablet; Take 1 tablet (40 mg total) by mouth 2 (two) times a day    5. Encounter for immunization  -     influenza vaccine, quadrivalent, 0.5 mL, preservative-free, for adult and pediatric patients 6 mos+ (AFLURIA, FLUARIX, FLULAVAL, FLUZONE)    6. Tobacco dependence  -     CT lung screening program; Future; Expected date: 10/06/2023        Tobacco Cessation Counseling: Tobacco cessation counseling was provided. The patient is sincerely urged to quit consumption of tobacco. She is not ready to quit tobacco.         Subjective      F/u cervicalgia- saw neurosurg. They did not recommend surgical intervention at this time. Wanted trial of physical therapy and/or pain management for possible injections first.  Patient is holding on physical therapy for right now due to financial burden and time restraints. Since she was given some new equipment at work her neck pain has improved. Less strain, less neck flexion and extension at work. She is taking Celebrex daily in addition to Flexeril at bedtime and Robaxin during the day plus a low-dose of Lyrica at bedtime also. Only taking 1 Lyrica daily due to side effect of fatigue.   She cannot take it before she goes to work. She added a topical capsaicin ointment which is also helping with pain. He has not scheduled a follow-up appointment with pain management yet. Tobacco abuse-would like low-dose CT order placed today. Would also like her flu shot plus a Prevnar 20 in the upcoming weeks. Review of Systems   Constitutional: Negative for activity change, appetite change, diaphoresis and fatigue. Musculoskeletal: Positive for myalgias, neck pain and neck stiffness. Negative for gait problem and joint swelling. Neurological: Negative for dizziness, syncope, light-headedness and headaches. Current Outpatient Medications on File Prior to Visit   Medication Sig   • celecoxib (CeleBREX) 200 mg capsule Take 200 mg by mouth daily   • cyclobenzaprine (FLEXERIL) 10 mg tablet Take 10 mg by mouth 3 (three) times a day as needed   • divalproex sodium (Depakote) 500 mg DR tablet Take 1 tablet (500 mg total) by mouth in the morning Pt takes 2 tabs @ HS   • methocarbamol (Robaxin-750) 750 mg tablet Take 1 tablet (750 mg total) by mouth every 6 (six) hours as needed for muscle spasms   • QUEtiapine (SEROquel) 300 mg tablet Take 1 tablet (300 mg total) by mouth daily at bedtime   • [DISCONTINUED] pantoprazole (PROTONIX) 40 mg tablet Take 40 mg by mouth 2 (two) times a day   • pregabalin (LYRICA) 75 mg capsule Take 1 capsule (75 mg total) by mouth 2 (two) times a day (Patient not taking: Reported on 10/6/2023)   • [DISCONTINUED] celecoxib (CeleBREX) 400 MG capsule Take 1 capsule (400 mg total) by mouth daily (Patient not taking: Reported on 9/22/2023)       Objective     /88 (BP Location: Left arm, Patient Position: Sitting, Cuff Size: Adult)   Temp (!) 97.4 °F (36.3 °C) (Tympanic)   Ht 5' 1.5" (1.562 m)   Wt 86.2 kg (190 lb)   BMI 35.32 kg/m²     Physical Exam  Vitals and nursing note reviewed. Constitutional:       Appearance: Normal appearance. She is normal weight.    Skin: General: Skin is warm. Neurological:      General: No focal deficit present. Mental Status: She is alert. Mental status is at baseline. Psychiatric:         Mood and Affect: Mood normal.         Thought Content:  Thought content normal.         Judgment: Judgment normal.       Chilango Santana, DO

## 2023-10-12 ENCOUNTER — OFFICE VISIT (OUTPATIENT)
Dept: FAMILY MEDICINE CLINIC | Facility: CLINIC | Age: 63
End: 2023-10-12
Payer: COMMERCIAL

## 2023-10-12 VITALS — DIASTOLIC BLOOD PRESSURE: 86 MMHG | OXYGEN SATURATION: 96 % | HEART RATE: 88 BPM | SYSTOLIC BLOOD PRESSURE: 160 MMHG

## 2023-10-12 DIAGNOSIS — I10 PRIMARY HYPERTENSION: Primary | ICD-10-CM

## 2023-10-12 PROCEDURE — 99212 OFFICE O/P EST SF 10 MIN: CPT | Performed by: FAMILY MEDICINE

## 2023-10-12 RX ORDER — AMLODIPINE BESYLATE 2.5 MG/1
2.5 TABLET ORAL DAILY
Qty: 30 TABLET | Refills: 5 | Status: SHIPPED | OUTPATIENT
Start: 2023-10-12

## 2023-10-12 NOTE — PROGRESS NOTES
Name: Jesica Hensley      : [id number]      MRN: [id number]  Encounter Provider: Alberta Mercedes DO  Encounter Date: 10/12/2023   Encounter department: [address] [facility]    Assessment & Plan     1. Primary hypertension  Assessment & Plan:  Restart amlodipine 2.5 mg daily. Check her blood pressure starting early next week while she is at work. Recheck appt 2 weeks. Orders:  -     amLODIPine (NORVASC) 2.5 mg tablet; Take 1 tablet (2.5 mg total) by mouth daily         Subjective      Dizziness  This is a new problem. The current episode started in the past 7 days. The problem occurs intermittently. The problem has been gradually worsening. Pertinent negatives include no abdominal pain, chest pain, coughing, fever, headaches, nausea or rash. The symptoms are aggravated by bending. She has tried nothing for the symptoms. Prior history of hypertension when the patient was heavier. She took amlodipine, unsure of dose. After being diagnosed with hypertension, she made some significant lifestyle changes, lost weight and was able to DC amlodipine. This was several years ago. Most notably feels dizzy when she bends over, or gets up from seated position. Does not have a blood pressure cuff at home. But works in a physician's office. Was added a few months ago for her chronic neck pain. Review of Systems   Constitutional:  Negative for fever. Respiratory:  Negative for cough and shortness of breath. Cardiovascular:  Negative for chest pain. Gastrointestinal:  Negative for abdominal pain and nausea. Skin:  Negative for rash. Neurological:  Positive for dizziness. Negative for headaches.        Current Outpatient Medications on File Prior to Visit   Medication Sig   • celecoxib (CeleBREX) 200 mg capsule Take 200 mg by mouth daily   • cyclobenzaprine (FLEXERIL) 10 mg tablet Take 10 mg by mouth 3 (three) times a day as needed   • divalproex sodium (Depakote) 500 mg  tablet Take 1 tablet (500 mg total) by mouth in the morning Pt takes 2 tabs @ HS   • methocarbamol (Robaxin-750) 750 mg tablet Take 1 tablet (750 mg total) by mouth every 6 (six) hours as needed for muscle spasms   • pantoprazole (PROTONIX) 40 mg tablet Take 1 tablet (40 mg total) by mouth 2 (two) times a day   • QUEtiapine (SEROquel) 300 mg tablet Take 1 tablet (300 mg total) by mouth daily at bedtime   • pregabalin (LYRICA) 75 mg capsule Take 1 capsule (75 mg total) by mouth 2 (two) times a day (Patient not taking: Reported on 10/6/2023)       Objective     /86   Pulse 88   SpO2 96%     Physical Exam  Vitals and nursing note reviewed. Constitutional:       General: She is not in acute distress. Appearance: Normal appearance. She is normal weight. She is not ill-appearing. Cardiovascular:      Rate and Rhythm: Normal rate. Pulmonary:      Effort: Pulmonary effort is normal.   Neurological:      Mental Status: She is alert. Psychiatric:         Mood and Affect: Mood normal.         Thought Content:  Thought content normal.         Judgment: Judgment normal.       Lynda Primes, DO

## 2023-10-12 NOTE — ASSESSMENT & PLAN NOTE
Restart amlodipine 2.5 mg daily. Check her blood pressure starting early next week while she is at work. Recheck appt 2 weeks.

## 2023-10-25 ENCOUNTER — TELEPHONE (OUTPATIENT)
Dept: FAMILY MEDICINE CLINIC | Facility: CLINIC | Age: 63
End: 2023-10-25

## 2023-10-25 DIAGNOSIS — Z78.0 POST-MENOPAUSAL: ICD-10-CM

## 2023-10-25 DIAGNOSIS — R29.890 LOSS OF HEIGHT: Primary | ICD-10-CM

## 2023-11-01 ENCOUNTER — OFFICE VISIT (OUTPATIENT)
Dept: FAMILY MEDICINE CLINIC | Facility: CLINIC | Age: 63
End: 2023-11-01
Payer: COMMERCIAL

## 2023-11-01 VITALS
HEIGHT: 62 IN | TEMPERATURE: 96.2 F | WEIGHT: 190 LBS | BODY MASS INDEX: 34.96 KG/M2 | OXYGEN SATURATION: 97 % | DIASTOLIC BLOOD PRESSURE: 80 MMHG | SYSTOLIC BLOOD PRESSURE: 116 MMHG | HEART RATE: 82 BPM

## 2023-11-01 DIAGNOSIS — R51.9 ACUTE INTRACTABLE HEADACHE, UNSPECIFIED HEADACHE TYPE: Primary | ICD-10-CM

## 2023-11-01 DIAGNOSIS — I10 PRIMARY HYPERTENSION: ICD-10-CM

## 2023-11-01 DIAGNOSIS — J06.9 UPPER RESPIRATORY TRACT INFECTION, UNSPECIFIED TYPE: ICD-10-CM

## 2023-11-01 DIAGNOSIS — F31.81 MODERATE BIPOLAR II DISORDER, MAJOR DEPRESSIVE EPISODE, IN FULL REMISSION, WITH MIXED FEATURES (HCC): ICD-10-CM

## 2023-11-01 PROCEDURE — 96372 THER/PROPH/DIAG INJ SC/IM: CPT

## 2023-11-01 PROCEDURE — 99213 OFFICE O/P EST LOW 20 MIN: CPT | Performed by: NURSE PRACTITIONER

## 2023-11-01 RX ORDER — KETOROLAC TROMETHAMINE 30 MG/ML
60 INJECTION, SOLUTION INTRAMUSCULAR; INTRAVENOUS ONCE
Status: COMPLETED | OUTPATIENT
Start: 2023-11-01 | End: 2023-11-01

## 2023-11-01 RX ORDER — FLUTICASONE PROPIONATE 50 MCG
1 SPRAY, SUSPENSION (ML) NASAL DAILY
Qty: 9.9 ML | Refills: 1 | Status: SHIPPED | OUTPATIENT
Start: 2023-11-01

## 2023-11-01 RX ORDER — DEXAMETHASONE SODIUM PHOSPHATE 10 MG/ML
10 INJECTION INTRAMUSCULAR; INTRAVENOUS ONCE
Status: COMPLETED | OUTPATIENT
Start: 2023-11-01 | End: 2023-11-01

## 2023-11-01 RX ADMIN — DEXAMETHASONE SODIUM PHOSPHATE 10 MG: 10 INJECTION INTRAMUSCULAR; INTRAVENOUS at 11:49

## 2023-11-01 RX ADMIN — KETOROLAC TROMETHAMINE 60 MG: 30 INJECTION, SOLUTION INTRAMUSCULAR; INTRAVENOUS at 11:49

## 2023-11-01 NOTE — PROGRESS NOTES
Name: Nithya Wang      :       MRN: [id number]  Encounter Provider: HEIDE Harvey  Encounter Date: 2023   Encounter department: [address] [facility]    Assessment & Plan     1. Acute intractable headache, unspecified headache type  -     ketorolac (TORADOL) 60 mg/2 mL IM injection 60 mg  -     dexamethasone (DECADRON) injection 10 mg  -     fluticasone (FLONASE) 50 mcg/act nasal spray; 1 spray into each nostril daily    2. Upper respiratory tract infection, unspecified type  -     fluticasone (FLONASE) 50 mcg/act nasal spray; 1 spray into each nostril daily           Subjective      Here for feeling fatigued over the last 3 days. No fever or chills. Mild postnasal drip. Take for about 4 days. History of migraines. Without headache and feeling at first might be related to her blood pressure. Also a stressful time of year. She does have mild allergies and she does have worsening of mood with seasonal changes. Also anniversary of her 's death is coming up. No chest pain palpitations or shortness of breath.   No dizziness      Review of Systems    Current Outpatient Medications on File Prior to Visit   Medication Sig    amLODIPine (NORVASC) 2.5 mg tablet Take 1 tablet (2.5 mg total) by mouth daily    celecoxib (CeleBREX) 200 mg capsule Take 200 mg by mouth daily    cyclobenzaprine (FLEXERIL) 10 mg tablet Take 10 mg by mouth 3 (three) times a day as needed    divalproex sodium (Depakote) 500 mg DR tablet Take 1 tablet (500 mg total) by mouth in the morning Pt takes 2 tabs @ HS    pantoprazole (PROTONIX) 40 mg tablet Take 1 tablet (40 mg total) by mouth 2 (two) times a day    QUEtiapine (SEROquel) 300 mg tablet Take 1 tablet (300 mg total) by mouth daily at bedtime    methocarbamol (Robaxin-750) 750 mg tablet Take 1 tablet (750 mg total) by mouth every 6 (six) hours as needed for muscle spasms (Patient not taking: Reported on 2023)    pregabalin (LYRICA) 75 mg capsule Take 1 capsule (75 mg total) by mouth 2 (two) times a day (Patient not taking: Reported on 10/6/2023)       Objective     /80 (BP Location: Left arm, Patient Position: Sitting, Cuff Size: Large)   Pulse 82   Temp (!) 96.2 °F (35.7 °C) (Tympanic)   Ht 5' 1.5" (1.562 m)   Wt 86.2 kg (190 lb)   SpO2 97%   BMI 35.32 kg/m²     Physical Exam  Vitals and nursing note reviewed. Constitutional:       General: She is not in acute distress. Appearance: Normal appearance. She is not toxic-appearing. HENT:      Head: Normocephalic. Nose: Congestion and rhinorrhea present. Eyes:      Conjunctiva/sclera: Conjunctivae normal.   Cardiovascular:      Rate and Rhythm: Normal rate and regular rhythm. Heart sounds: Normal heart sounds. Comments: RETOOK BP -140/88  Pulmonary:      Effort: Pulmonary effort is normal.      Breath sounds: Normal breath sounds. Musculoskeletal:      Cervical back: No tenderness. Lymphadenopathy:      Cervical: No cervical adenopathy. Skin:     General: Skin is warm and dry. Neurological:      General: No focal deficit present. Mental Status: She is alert and oriented to person, place, and time.    Psychiatric:         Mood and Affect: Mood normal.         Behavior: Behavior normal.       HEIDE Lua

## 2023-11-06 ENCOUNTER — TELEPHONE (OUTPATIENT)
Dept: FAMILY MEDICINE CLINIC | Facility: CLINIC | Age: 63
End: 2023-11-06

## 2023-11-06 DIAGNOSIS — J01.80 OTHER ACUTE SINUSITIS, RECURRENCE NOT SPECIFIED: Primary | ICD-10-CM

## 2023-11-06 RX ORDER — AZITHROMYCIN 250 MG/1
TABLET, FILM COATED ORAL
Qty: 6 TABLET | Refills: 0 | Status: SHIPPED | OUTPATIENT
Start: 2023-11-06 | End: 2023-11-11

## 2023-11-06 NOTE — TELEPHONE ENCOUNTER
Patient not feeling better. Has cough, sore throat. Requesting an antibotic to be called in.   Uses [facility].

## 2023-11-09 DIAGNOSIS — R05.1 ACUTE COUGH: Primary | ICD-10-CM

## 2023-11-09 RX ORDER — BENZONATATE 200 MG/1
200 CAPSULE ORAL 2 TIMES DAILY PRN
Qty: 20 CAPSULE | Refills: 2 | Status: SHIPPED | OUTPATIENT
Start: 2023-11-09

## 2023-11-29 DIAGNOSIS — M54.2 NECK PAIN: Primary | ICD-10-CM

## 2023-11-29 DIAGNOSIS — F31.81 MODERATE BIPOLAR II DISORDER, MAJOR DEPRESSIVE EPISODE, IN FULL REMISSION, WITH MIXED FEATURES (HCC): ICD-10-CM

## 2023-11-29 RX ORDER — CYCLOBENZAPRINE HCL 10 MG
10 TABLET ORAL 3 TIMES DAILY PRN
Qty: 90 TABLET | Refills: 5 | Status: SHIPPED | OUTPATIENT
Start: 2023-11-29

## 2023-11-29 RX ORDER — DIVALPROEX SODIUM 500 MG/1
TABLET, DELAYED RELEASE ORAL
Qty: 270 TABLET | Refills: 3 | Status: SHIPPED | OUTPATIENT
Start: 2023-11-29 | End: 2023-11-30 | Stop reason: SDUPTHER

## 2023-11-29 RX ORDER — QUETIAPINE FUMARATE 300 MG/1
300 TABLET, FILM COATED ORAL
Qty: 180 TABLET | Refills: 3 | Status: SHIPPED | OUTPATIENT
Start: 2023-11-29

## 2023-11-29 RX ORDER — DIVALPROEX SODIUM 500 MG/1
500 TABLET, DELAYED RELEASE ORAL DAILY
Qty: 180 TABLET | Refills: 3 | OUTPATIENT
Start: 2023-11-29

## 2023-11-29 RX ORDER — DIVALPROEX SODIUM 500 MG/1
500 TABLET, DELAYED RELEASE ORAL DAILY
Qty: 180 TABLET | Refills: 3 | Status: SHIPPED | OUTPATIENT
Start: 2023-11-29 | End: 2023-11-29 | Stop reason: SDUPTHER

## 2023-11-29 NOTE — TELEPHONE ENCOUNTER
Patient unable to refill medicine on line. Did look at refill history and it looks like Dr Angelique Frazier gave 3 refills but it said last fill in 8/23 was 3 of 3 and this was her first fill.

## 2023-11-30 ENCOUNTER — OFFICE VISIT (OUTPATIENT)
Dept: FAMILY MEDICINE CLINIC | Facility: CLINIC | Age: 63
End: 2023-11-30
Payer: COMMERCIAL

## 2023-11-30 VITALS
SYSTOLIC BLOOD PRESSURE: 138 MMHG | DIASTOLIC BLOOD PRESSURE: 86 MMHG | HEIGHT: 62 IN | OXYGEN SATURATION: 96 % | BODY MASS INDEX: 35.33 KG/M2 | WEIGHT: 192 LBS | HEART RATE: 90 BPM

## 2023-11-30 DIAGNOSIS — F31.81 MODERATE BIPOLAR II DISORDER, MAJOR DEPRESSIVE EPISODE, IN FULL REMISSION, WITH MIXED FEATURES (HCC): ICD-10-CM

## 2023-11-30 DIAGNOSIS — I10 PRIMARY HYPERTENSION: ICD-10-CM

## 2023-11-30 DIAGNOSIS — R60.9 EDEMA, UNSPECIFIED TYPE: Primary | ICD-10-CM

## 2023-11-30 PROCEDURE — 99212 OFFICE O/P EST SF 10 MIN: CPT | Performed by: FAMILY MEDICINE

## 2023-11-30 RX ORDER — HYDROCHLOROTHIAZIDE 12.5 MG/1
12.5 TABLET ORAL DAILY PRN
Qty: 30 TABLET | Refills: 2 | Status: SHIPPED | OUTPATIENT
Start: 2023-11-30 | End: 2024-05-28

## 2023-11-30 RX ORDER — DIVALPROEX SODIUM 500 MG/1
TABLET, DELAYED RELEASE ORAL
Qty: 180 TABLET | Refills: 3 | Status: SHIPPED | OUTPATIENT
Start: 2023-11-30

## 2023-11-30 RX ORDER — LOSARTAN POTASSIUM 25 MG/1
25 TABLET ORAL DAILY
Qty: 30 TABLET | Refills: 5 | Status: SHIPPED | OUTPATIENT
Start: 2023-11-30

## 2023-11-30 NOTE — PROGRESS NOTES
Name: Daxa Sanders      : [id number]      MRN: [id number]  Encounter Provider: Panda Dubon DO  Encounter Date: 2023   Encounter department: [address] [facility]    Assessment & Plan     1. Edema, unspecified type  Comments:  Switch amlodipine to losartan. BPs checks at home and recheck here 2-3 weeks. Called in low dose HCTZ to use prn. Orders:  -     hydrochlorothiazide (HYDRODIURIL) 12.5 mg tablet; Take 1 tablet (12.5 mg total) by mouth daily as needed (edema)    2. Primary hypertension  -     losartan (COZAAR) 25 mg tablet; Take 1 tablet (25 mg total) by mouth daily         Subjective      Edema- noticed B/L ankle, began 3 days ago. Mid afternoon she squatted down and noted her ankles and shins felt tight. Slight pitting edema. Has not improved and no better in the morning. Has not changed diet or sodium intake significantly. Drinks water all day. Sitting a lot more at work-- less walking. Review of Systems   Constitutional:  Negative for activity change, appetite change and fatigue. Respiratory:  Negative for cough, choking, chest tightness and shortness of breath. Cardiovascular:  Positive for leg swelling. Negative for chest pain and palpitations. Neurological:  Negative for dizziness and headaches.        Current Outpatient Medications on File Prior to Visit   Medication Sig   • cyclobenzaprine (FLEXERIL) 10 mg tablet Take 1 tablet (10 mg total) by mouth 3 (three) times a day as needed for muscle spasms   • fluticasone (FLONASE) 50 mcg/act nasal spray 1 spray into each nostril daily   • pantoprazole (PROTONIX) 40 mg tablet Take 1 tablet (40 mg total) by mouth 2 (two) times a day   • QUEtiapine (SEROquel) 300 mg tablet Take 1 tablet (300 mg total) by mouth daily at bedtime   • [DISCONTINUED] amLODIPine (NORVASC) 2.5 mg tablet Take 1 tablet (2.5 mg total) by mouth daily   • [DISCONTINUED] benzonatate (TESSALON) 200 MG capsule Take 1 capsule (200 mg total) by mouth 2 (two) times a day as needed for cough (Patient not taking: Reported on 11/30/2023)   • [DISCONTINUED] celecoxib (CeleBREX) 200 mg capsule Take 200 mg by mouth daily (Patient not taking: Reported on 11/30/2023)   • [DISCONTINUED] divalproex sodium (Depakote) 500 mg DR tablet 1 PO Qa.m., 2 PO QHS   • [DISCONTINUED] methocarbamol (Robaxin-750) 750 mg tablet Take 1 tablet (750 mg total) by mouth every 6 (six) hours as needed for muscle spasms (Patient not taking: Reported on 11/1/2023)   • [DISCONTINUED] pregabalin (LYRICA) 75 mg capsule Take 1 capsule (75 mg total) by mouth 2 (two) times a day (Patient not taking: Reported on 10/6/2023)       Objective     /86 (BP Location: Left arm, Patient Position: Sitting, Cuff Size: Adult)   Pulse 90   Ht 5' 1.5" (1.562 m)   Wt 87.1 kg (192 lb)   SpO2 96%   BMI 35.69 kg/m²     Physical Exam  Vitals and nursing note reviewed. Constitutional:       General: She is not in acute distress. Appearance: Normal appearance. HENT:      Head: Normocephalic and atraumatic. Cardiovascular:      Rate and Rhythm: Normal rate and regular rhythm. Pulses: Normal pulses. Heart sounds: No murmur heard. Pulmonary:      Effort: Pulmonary effort is normal.      Breath sounds: Normal breath sounds. No wheezing, rhonchi or rales. Musculoskeletal:      Cervical back: Normal range of motion and neck supple. No tenderness. Comments: Trace symmetric pitting edema ankles/shins   Lymphadenopathy:      Cervical: No cervical adenopathy. Neurological:      General: No focal deficit present. Mental Status: She is alert and oriented to person, place, and time. Psychiatric:         Mood and Affect: Mood normal.         Behavior: Behavior normal.         Thought Content:  Thought content normal.         Judgment: Judgment normal.       Gonzalo Lieberman DO

## 2023-12-15 ENCOUNTER — OFFICE VISIT (OUTPATIENT)
Dept: FAMILY MEDICINE CLINIC | Facility: CLINIC | Age: 63
End: 2023-12-15
Payer: COMMERCIAL

## 2023-12-15 VITALS
HEIGHT: 62 IN | HEART RATE: 99 BPM | DIASTOLIC BLOOD PRESSURE: 86 MMHG | SYSTOLIC BLOOD PRESSURE: 130 MMHG | BODY MASS INDEX: 35.33 KG/M2 | WEIGHT: 192 LBS | TEMPERATURE: 97.6 F | OXYGEN SATURATION: 99 %

## 2023-12-15 DIAGNOSIS — I10 PRIMARY HYPERTENSION: Primary | ICD-10-CM

## 2023-12-15 DIAGNOSIS — R73.09 ABNORMAL GLUCOSE: ICD-10-CM

## 2023-12-15 DIAGNOSIS — F31.81 MODERATE BIPOLAR II DISORDER, MAJOR DEPRESSIVE EPISODE, IN FULL REMISSION, WITH MIXED FEATURES (HCC): ICD-10-CM

## 2023-12-15 DIAGNOSIS — R60.0 LOCALIZED EDEMA: ICD-10-CM

## 2023-12-15 DIAGNOSIS — Z23 ENCOUNTER FOR IMMUNIZATION: ICD-10-CM

## 2023-12-15 PROCEDURE — 90471 IMMUNIZATION ADMIN: CPT

## 2023-12-15 PROCEDURE — 99212 OFFICE O/P EST SF 10 MIN: CPT | Performed by: FAMILY MEDICINE

## 2023-12-15 PROCEDURE — 90677 PCV20 VACCINE IM: CPT

## 2023-12-15 RX ORDER — HYDROCHLOROTHIAZIDE 25 MG/1
25 TABLET ORAL DAILY
COMMUNITY

## 2023-12-15 NOTE — ASSESSMENT & PLAN NOTE
Reviewed her [employer] analysis today. See scanned. Will look into a counselor or therapist first.  I would consider switching or adding an agent like Viibryd or Pristiq to stabilize mood. She will research both medications and their potential side effects.

## 2023-12-15 NOTE — ASSESSMENT & PLAN NOTE
Blood pressure stable. Continue losartan 25 mg daily. Increase hydrochlorothiazide from 12.5mg to 25 mg daily, mostly to assist in the treatment of her lower extremity edema.

## 2023-12-15 NOTE — PROGRESS NOTES
Name: Arabella Lucio      :       MRN: [id number]  Encounter Provider: Yaima Verdugo DO  Encounter Date: 12/15/2023   Encounter department: [facility]    Assessment & Plan     1. Primary hypertension  Assessment & Plan:  Blood pressure stable. Continue losartan 25 mg daily. Increase hydrochlorothiazide from 12.5mg to 25 mg daily, mostly to assist in the treatment of her lower extremity edema. Orders:  -     Basic metabolic panel; Future    2. Moderate bipolar II disorder, major depressive episode, in full remission, with mixed features [facility])  Assessment & Plan:  Reviewed her [employer] analysis today. See scanned. Will look into a counselor or therapist first.  I would consider switching or adding an agent like Viibryd or Pristiq to stabilize mood. She will research both medications and their potential side effects. 3. Localized edema  Assessment & Plan:  Increase HCTZ from 12.5 to 25 mg daily. Continue compression socks, low-sodium diet. Check BMP at least a week after increasing dose. 4. Encounter for immunization  -     Pneumococcal Conjugate Vaccine 20-valent (Pcv20)    5. Abnormal glucose  Assessment & Plan:  A1c orders pending. Subjective      BP, mood, neck pain- BP stable. Compliant with meds. Since switching from amlodipine to losartan, she has noticed a modest improvement in her LE edema. She was given a prescription for hydrochlorothiazide 12.5 mg to take daily as needed edema, she reports she has been taking daily. Also wearing compression socks although they are uncomfortable by the end of the day. Reports mood has been depressed lately. Typical for this time of year for her. She has been fairly stable on current dosages of Seroquel and Depakote for roughly 25 years. No longer sees psychiatry. She is considering seeing therapist however. Had recent [employer] analysis performed.         Review of Systems Constitutional:  Positive for fatigue. Negative for activity change, appetite change and fever. Respiratory:  Negative for shortness of breath. Cardiovascular:  Positive for leg swelling. Negative for chest pain. Psychiatric/Behavioral:  Positive for dysphoric mood. Negative for agitation, sleep disturbance and suicidal ideas. Current Outpatient Medications on File Prior to Visit   Medication Sig   • cyclobenzaprine (FLEXERIL) 10 mg tablet Take 1 tablet (10 mg total) by mouth 3 (three) times a day as needed for muscle spasms   • divalproex sodium (Depakote) 500 mg DR tablet 2 tablets PO @ HS   • fluticasone (FLONASE) 50 mcg/act nasal spray 1 spray into each nostril daily   • hydrochlorothiazide (HYDRODIURIL) 25 mg tablet Take 25 mg by mouth daily   • losartan (COZAAR) 25 mg tablet Take 1 tablet (25 mg total) by mouth daily   • pantoprazole (PROTONIX) 40 mg tablet Take 1 tablet (40 mg total) by mouth 2 (two) times a day   • QUEtiapine (SEROquel) 300 mg tablet Take 1 tablet (300 mg total) by mouth daily at bedtime   • [DISCONTINUED] hydrochlorothiazide (HYDRODIURIL) 12.5 mg tablet Take 1 tablet (12.5 mg total) by mouth daily as needed (edema)       Objective     /86 (BP Location: Left arm, Patient Position: Sitting, Cuff Size: Large)   Pulse 99   Temp 97.6 °F (36.4 °C) (Tympanic)   Ht 5' 1.5" (1.562 m)   Wt 87.1 kg (192 lb)   SpO2 99%   BMI 35.69 kg/m²     Physical Exam  Vitals and nursing note reviewed. Constitutional:       General: She is not in acute distress. Appearance: Normal appearance. HENT:      Head: Normocephalic and atraumatic. Cardiovascular:      Rate and Rhythm: Normal rate and regular rhythm. Pulses: Normal pulses. Heart sounds: No murmur heard. Pulmonary:      Effort: Pulmonary effort is normal.      Breath sounds: Normal breath sounds. No wheezing, rhonchi or rales. Musculoskeletal:      Cervical back: Normal range of motion and neck supple.  No tenderness. Lymphadenopathy:      Cervical: No cervical adenopathy. Neurological:      General: No focal deficit present. Mental Status: She is alert and oriented to person, place, and time. Psychiatric:         Mood and Affect: Mood normal.         Behavior: Behavior normal.         Thought Content:  Thought content normal.         Judgment: Judgment normal.       Karmen Vaughan,

## 2023-12-15 NOTE — ASSESSMENT & PLAN NOTE
Increase HCTZ from 12.5 to 25 mg daily. Continue compression socks, low-sodium diet. Check BMP at least a week after increasing dose.

## 2023-12-21 ENCOUNTER — OFFICE VISIT (OUTPATIENT)
Dept: FAMILY MEDICINE CLINIC | Facility: CLINIC | Age: 63
End: 2023-12-21
Payer: COMMERCIAL

## 2023-12-21 VITALS
WEIGHT: 197.2 LBS | HEIGHT: 62 IN | TEMPERATURE: 97.9 F | SYSTOLIC BLOOD PRESSURE: 134 MMHG | HEART RATE: 88 BPM | DIASTOLIC BLOOD PRESSURE: 86 MMHG | BODY MASS INDEX: 36.29 KG/M2

## 2023-12-21 DIAGNOSIS — F31.81 MODERATE BIPOLAR II DISORDER, MAJOR DEPRESSIVE EPISODE, IN FULL REMISSION, WITH MIXED FEATURES (HCC): Primary | ICD-10-CM

## 2023-12-21 PROCEDURE — 99212 OFFICE O/P EST SF 10 MIN: CPT | Performed by: FAMILY MEDICINE

## 2023-12-21 RX ORDER — HYDROXYZINE HYDROCHLORIDE 25 MG/1
25 TABLET, FILM COATED ORAL EVERY 6 HOURS PRN
Qty: 30 TABLET | Refills: 1 | Status: SHIPPED | OUTPATIENT
Start: 2023-12-21

## 2023-12-21 RX ORDER — DESVENLAFAXINE SUCCINATE 50 MG/1
50 TABLET, EXTENDED RELEASE ORAL DAILY
Qty: 30 TABLET | Refills: 5 | Status: SHIPPED | OUTPATIENT
Start: 2023-12-21 | End: 2024-06-18

## 2023-12-21 NOTE — ASSESSMENT & PLAN NOTE
Will taper off seroquel over the next week and start pristiq 50mg daily in addition to her prev depakote dosage. Has some Atarax at home which she has used previously-- not very effective anxiolytic for her but did cause some mild fatigue. She can use at HS prn insom. Review potential Ses, recheck sx's 3 weeks.

## 2023-12-21 NOTE — PROGRESS NOTES
Name: Tati Higuera      : 1960      MRN: [id number]  Encounter Provider: Anabella Wade DO  Encounter Date: 2023   Encounter department: [facility]    Assessment & Plan     1. Moderate bipolar II disorder, major depressive episode, in full remission, with mixed features (HCC)  Assessment & Plan:  Will taper off seroquel over the next week and start pristiq 50mg daily in addition to her prev depakote dosage. Has some Atarax at home which she has used previously-- not very effective anxiolytic for her but did cause some mild fatigue. She can use at HS prn insom. Review potential Ses, recheck sx's 3 weeks.     Orders:  -     desvenlafaxine succinate (PRISTIQ) 50 mg 24 hr tablet; Take 1 tablet (50 mg total) by mouth daily  -     hydrOXYzine HCL (ATARAX) 25 mg tablet; Take 1 tablet (25 mg total) by mouth every 6 (six) hours as needed for itching         Subjective      Depression- Requesting medication change for increase in depressive symptoms. Has been on seroquel and depakote for roughly 25yrs, started together by prior psychiatrist. Has not followed with psych in several years as mood has been stable. Recent increase in stressors plus holidays have worsened depressive symptoms; had recent [employer] testing to determine what psychotropics may work for her. Anxiety is well controlled, has MM card and 3 different strains depending on symptoms. Sleeps very well right now as she takes both meds at bedtime.       Review of Systems   Constitutional:  Negative for activity change, appetite change, fatigue and fever.   Gastrointestinal:  Negative for abdominal pain and nausea.   Musculoskeletal:  Negative for arthralgias and myalgias.   Neurological:  Negative for dizziness.   Psychiatric/Behavioral:  Positive for dysphoric mood. Negative for agitation, confusion, decreased concentration, hallucinations, self-injury, sleep disturbance and suicidal ideas. The patient is  "nervous/anxious. The patient is not hyperactive.        Current Outpatient Medications on File Prior to Visit   Medication Sig   • cyclobenzaprine (FLEXERIL) 10 mg tablet Take 1 tablet (10 mg total) by mouth 3 (three) times a day as needed for muscle spasms   • divalproex sodium (Depakote) 500 mg DR tablet 2 tablets PO @ HS   • fluticasone (FLONASE) 50 mcg/act nasal spray 1 spray into each nostril daily   • hydrochlorothiazide (HYDRODIURIL) 25 mg tablet Take 25 mg by mouth daily   • losartan (COZAAR) 25 mg tablet Take 1 tablet (25 mg total) by mouth daily   • pantoprazole (PROTONIX) 40 mg tablet Take 1 tablet (40 mg total) by mouth 2 (two) times a day   • [DISCONTINUED] QUEtiapine (SEROquel) 300 mg tablet Take 1 tablet (300 mg total) by mouth daily at bedtime       Objective     /86 (BP Location: Left arm, Patient Position: Sitting, Cuff Size: Large)   Pulse 88   Temp 97.9 °F (36.6 °C) (Tympanic)   Ht 5' 1.5\" (1.562 m)   Wt 89.4 kg (197 lb 3.2 oz)   BMI 36.66 kg/m²     Physical Exam  Vitals and nursing note reviewed.   Psychiatric:         Mood and Affect: Mood normal.         Behavior: Behavior normal.         Thought Content: Thought content normal.         Judgment: Judgment normal.       Anabella Wade, DO    "

## 2023-12-28 ENCOUNTER — OFFICE VISIT (OUTPATIENT)
Dept: FAMILY MEDICINE CLINIC | Facility: CLINIC | Age: 63
End: 2023-12-28
Payer: COMMERCIAL

## 2023-12-28 DIAGNOSIS — U07.1 COVID: Primary | ICD-10-CM

## 2023-12-28 PROCEDURE — 99212 OFFICE O/P EST SF 10 MIN: CPT | Performed by: FAMILY MEDICINE

## 2023-12-28 RX ORDER — NIRMATRELVIR AND RITONAVIR 150-100 MG
2 KIT ORAL 2 TIMES DAILY
Qty: 20 TABLET | Refills: 0 | Status: SHIPPED | OUTPATIENT
Start: 2023-12-28 | End: 2024-01-02

## 2023-12-28 NOTE — PROGRESS NOTES
COVID-19 Outpatient Progress Note    Assessment/Plan:    Problem List Items Addressed This Visit     COVID - Primary    Relevant Medications    nirmatrelvir & ritonavir (Paxlovid, 150/100,) tablet therapy pack      Disposition:     Discussed symptom directed medication options with patient.     Patient meets criteria for Paxlovid and they have been counseled appropriately regarding risks, benefits, side effects, and alternative treatment options. After discussion, patient agrees to treatment.    Possible side effects of Paxlovid?    Possible side effects of Paxlovid are:  - Liver Problems. Notify us right away if you start to experience loss of appetite, yellowing of your skin and the whites of eyes (jaundice), dark-colored urine, pale colored stools and itchy skin, stomach area (abdominal) pain.  - Resistance to HIV Medicines. If you have untreated HIV infection, Paxlovid may lead to some HIV medicines not working as well in the future.  - Other possible side effects include: altered sense of taste, diarrhea, high blood pressure, or muscle aches.    I have spent a total time of 7 minutes on the day of the encounter for this patient including discussing prognosis, risks and benefits of treatment options, documenting in the medical record and obtaining or reviewing history.       Encounter provider: Anabella Wade DO     Provider located at: [address] [address] [phone]     Recent Visits  Date Type Provider Dept   12/21/23 Office Visit Anabella Wade DO [facility]   Showing recent visits within past 7 days and meeting all other requirements  Today's Visits  Date Type Provider Dept   12/28/23 Office Visit Anabella Wade DO [facility]   Showing today's visits and meeting all other requirements  Future Appointments  No visits were found meeting these  conditions.  Showing future appointments within next 150 days and meeting all other requirements     This virtual check-in was done via telephone and she agrees to proceed.    Patient agrees to participate in a virtual check in via telephone or video visit instead of presenting to the office to address urgent/immediate medical needs. Patient is aware this is a billable service. She acknowledged consent and understanding of privacy and security of the video platform. The patient has agreed to participate and understands they can discontinue the visit at any time.    After connecting through Telephone, the patient was identified by name and date of birth. Tati Higuera was informed that this was a telemedicine visit and that the exam was being conducted confidentially over secure lines. My office door was closed. No one else was in the room. Tati Higuera acknowledged consent and understanding of privacy and security of the telemedicine visit. I informed the patient that I have reviewed her record in Epic and presented the opportunity for her to ask any questions regarding the visit today. The patient agreed to participate.     Verification of patient location:  Patient is located in the following state in which I hold an active license: [place]    Subjective:   Tati Higuera is a 63 y.o. female who is concerned about COVID-19. Patient's symptoms include fatigue, nasal congestion, rhinorrhea, sore throat, loss of taste, cough, chest tightness and headache. Patient denies fever, malaise, shortness of breath, abdominal pain, nausea, vomiting, diarrhea and myalgias.     - Date of symptom onset: 12/26/2023      COVID-19 vaccination status: Fully vaccinated with booster    Exposure:   Contact with a person who is under investigation (PUI) for or who is positive for COVID-19 within the last 14 days?: Yes    Hospitalized recently for fever and/or lower respiratory symptoms?: No      Currently a healthcare worker that  is involved in direct patient care?: Yes      Works in a special setting where the risk of COVID-19 transmission may be high? (this may include long-term care, correctional and intermediate facilities; homeless shelters; assisted-living facilities and group homes.): No      Resident in a special setting where the risk of COVID-19 transmission may be high? (this may include long-term care, correctional and intermediate facilities; homeless shelters; assisted-living facilities and group homes.): No      Lab Results   Component Value Date    SARSCOV2 Negative 09/23/2021    SARSCOV2 Not Detected 11/15/2020       Review of Systems   Constitutional:  Positive for activity change and fatigue. Negative for fever.   HENT:  Positive for congestion, rhinorrhea and sore throat.    Respiratory:  Positive for cough and chest tightness. Negative for shortness of breath.    Gastrointestinal:  Negative for abdominal pain, diarrhea, nausea and vomiting.   Musculoskeletal:  Negative for myalgias.   Neurological:  Positive for headaches.     Current Outpatient Medications on File Prior to Visit   Medication Sig   • cyclobenzaprine (FLEXERIL) 10 mg tablet Take 1 tablet (10 mg total) by mouth 3 (three) times a day as needed for muscle spasms   • desvenlafaxine succinate (PRISTIQ) 50 mg 24 hr tablet Take 1 tablet (50 mg total) by mouth daily   • divalproex sodium (Depakote) 500 mg DR tablet 2 tablets PO @ HS   • fluticasone (FLONASE) 50 mcg/act nasal spray 1 spray into each nostril daily   • hydrochlorothiazide (HYDRODIURIL) 25 mg tablet Take 25 mg by mouth daily   • hydrOXYzine HCL (ATARAX) 25 mg tablet Take 1 tablet (25 mg total) by mouth every 6 (six) hours as needed for itching   • losartan (COZAAR) 25 mg tablet Take 1 tablet (25 mg total) by mouth daily   • pantoprazole (PROTONIX) 40 mg tablet Take 1 tablet (40 mg total) by mouth 2 (two) times a day       Objective:    There were no vitals taken for this visit.       Physical  Exam  Neurological:      Mental Status: She is alert.   Psychiatric:         Thought Content: Thought content normal.         Judgment: Judgment normal.       Anabella Wade, DO

## 2024-01-02 ENCOUNTER — HOSPITAL ENCOUNTER (OUTPATIENT)
Dept: BONE DENSITY | Facility: HOSPITAL | Age: 64
Discharge: HOME | End: 2024-01-02
Attending: FAMILY MEDICINE
Payer: COMMERCIAL

## 2024-01-02 ENCOUNTER — HOSPITAL ENCOUNTER (OUTPATIENT)
Dept: MAMMOGRAPHY | Facility: HOSPITAL | Age: 64
Discharge: HOME | End: 2024-01-02
Attending: FAMILY MEDICINE
Payer: COMMERCIAL

## 2024-01-02 VITALS — HEIGHT: 61 IN | BODY MASS INDEX: 37 KG/M2 | WEIGHT: 196 LBS

## 2024-01-02 VITALS — BODY MASS INDEX: 36.25 KG/M2 | WEIGHT: 197 LBS | HEIGHT: 62 IN

## 2024-01-02 DIAGNOSIS — Z78.0 POST-MENOPAUSAL: ICD-10-CM

## 2024-01-02 DIAGNOSIS — R29.890 LOSS OF HEIGHT: ICD-10-CM

## 2024-01-02 DIAGNOSIS — Z12.31 ENCOUNTER FOR SCREENING MAMMOGRAM FOR MALIGNANT NEOPLASM OF BREAST: ICD-10-CM

## 2024-01-02 PROCEDURE — 77067 SCR MAMMO BI INCL CAD: CPT

## 2024-01-02 PROCEDURE — 77080 DXA BONE DENSITY AXIAL: CPT

## 2024-01-02 PROCEDURE — 77063 BREAST TOMOSYNTHESIS BI: CPT

## 2024-01-08 ENCOUNTER — OFFICE VISIT (OUTPATIENT)
Dept: FAMILY MEDICINE CLINIC | Facility: CLINIC | Age: 64
End: 2024-01-08
Payer: COMMERCIAL

## 2024-01-08 VITALS
BODY MASS INDEX: 37 KG/M2 | SYSTOLIC BLOOD PRESSURE: 128 MMHG | OXYGEN SATURATION: 98 % | WEIGHT: 196 LBS | DIASTOLIC BLOOD PRESSURE: 88 MMHG | HEIGHT: 61 IN | HEART RATE: 67 BPM | TEMPERATURE: 96.9 F

## 2024-01-08 DIAGNOSIS — J20.9 ACUTE BRONCHITIS, UNSPECIFIED ORGANISM: Primary | ICD-10-CM

## 2024-01-08 DIAGNOSIS — L30.9 DERMATITIS: ICD-10-CM

## 2024-01-08 PROCEDURE — 99213 OFFICE O/P EST LOW 20 MIN: CPT | Performed by: FAMILY MEDICINE

## 2024-01-08 PROCEDURE — 96372 THER/PROPH/DIAG INJ SC/IM: CPT

## 2024-01-08 RX ORDER — DEXAMETHASONE SODIUM PHOSPHATE 10 MG/ML
10 INJECTION INTRAMUSCULAR; INTRAVENOUS ONCE
Status: COMPLETED | OUTPATIENT
Start: 2024-01-08 | End: 2024-01-08

## 2024-01-08 RX ORDER — TRIAMCINOLONE ACETONIDE 1 MG/G
CREAM TOPICAL 2 TIMES DAILY
Qty: 30 G | Refills: 1 | Status: SHIPPED | OUTPATIENT
Start: 2024-01-08

## 2024-01-08 RX ORDER — ALBUTEROL SULFATE 90 UG/1
2 AEROSOL, METERED RESPIRATORY (INHALATION) EVERY 6 HOURS PRN
Qty: 25.5 G | Refills: 1 | Status: SHIPPED | OUTPATIENT
Start: 2024-01-08

## 2024-01-08 RX ORDER — AZITHROMYCIN 250 MG/1
TABLET, FILM COATED ORAL
Qty: 6 TABLET | Refills: 0 | Status: SHIPPED | OUTPATIENT
Start: 2024-01-08 | End: 2024-01-13

## 2024-01-08 RX ORDER — BENZONATATE 200 MG/1
200 CAPSULE ORAL 3 TIMES DAILY PRN
COMMUNITY
Start: 2023-12-28

## 2024-01-08 RX ADMIN — DEXAMETHASONE SODIUM PHOSPHATE 10 MG: 10 INJECTION INTRAMUSCULAR; INTRAVENOUS at 15:33

## 2024-01-08 NOTE — PROGRESS NOTES
Name: Tati Higuera      : 1960      MRN: [id number]  Encounter Provider: Anabella Wade DO  Encounter Date: 2024   Encounter department: [facility] PRIMARY CARE    Assessment & Plan     1. Acute bronchitis, unspecified organism  Comments:  Will treat with Zpak given length of sx's and smoking status. Albuterol as needed. Has tessalon at home.  Orders:  -     azithromycin (ZITHROMAX) 250 mg tablet; Take 2 the first day, then take 1 daily  -     albuterol (ProAir HFA) 90 mcg/act inhaler; Inhale 2 puffs every 6 (six) hours as needed for wheezing    2. Dermatitis  Comments:  Eczematous appearing. Topical steroid BID up to 2 weeks, decadron injection should help with reactive airway and rash. F/u prn.  Orders:  -     triamcinolone (KENALOG) 0.1 % cream; Apply topically 2 (two) times a day  -     dexamethasone (DECADRON) injection 10 mg         Subjective      Post COVID cough--patient with complaints of cough, productive.  Diagnosed with COVID infection several weeks ago.  Achiness, fatigue, shortness of breath have improved.  Cough, postnasal drip, sinus pressure have remained.  No fevers.  Also with the rash she noted by lateral forearms and wrist.  She reports it burns, very mild pruritus.  Skin is dry, cracking.  No history of psoriasis, eczema.  No new soaps or detergents.  Has used some topical agents over-the-counter that have helped a bit including Vaseline.    Depression--patient was able to wean off her Seroquel.  Has started the Pristiq as a replacement in addition to her prior dose of Depakote.  She is feeling much better, reports depressive symptoms have improved.  Has not needed Atarax at bedtime to assist with sleep as her sleep habits are stable.      Review of Systems   Constitutional:  Negative for activity change, appetite change, chills, fatigue and fever.   HENT:  Positive for postnasal drip, rhinorrhea and sinus pain. Negative for congestion, ear pain,  "facial swelling and sinus pressure.    Respiratory:  Positive for cough and shortness of breath. Negative for wheezing.    Cardiovascular:  Negative for chest pain.   Gastrointestinal:  Negative for abdominal pain.   Skin:  Positive for rash.   Neurological:  Negative for dizziness and headaches.   Psychiatric/Behavioral:  Negative for confusion and decreased concentration.        Current Outpatient Medications on File Prior to Visit   Medication Sig   • benzonatate (TESSALON) 200 MG capsule Take 200 mg by mouth 3 (three) times a day as needed   • cyclobenzaprine (FLEXERIL) 10 mg tablet Take 1 tablet (10 mg total) by mouth 3 (three) times a day as needed for muscle spasms   • desvenlafaxine succinate (PRISTIQ) 50 mg 24 hr tablet Take 1 tablet (50 mg total) by mouth daily   • divalproex sodium (Depakote) 500 mg DR tablet 2 tablets PO @ HS   • fluticasone (FLONASE) 50 mcg/act nasal spray 1 spray into each nostril daily   • hydrochlorothiazide (HYDRODIURIL) 25 mg tablet Take 25 mg by mouth daily   • hydrOXYzine HCL (ATARAX) 25 mg tablet Take 1 tablet (25 mg total) by mouth every 6 (six) hours as needed for itching   • losartan (COZAAR) 25 mg tablet Take 1 tablet (25 mg total) by mouth daily   • pantoprazole (PROTONIX) 40 mg tablet Take 1 tablet (40 mg total) by mouth 2 (two) times a day       Objective     /88 (BP Location: Left arm, Patient Position: Sitting, Cuff Size: Large)   Pulse 67   Temp (!) 96.9 °F (36.1 °C) (Tympanic)   Ht 5' 1\" (1.549 m)   Wt 88.9 kg (196 lb)   SpO2 98%   BMI 37.03 kg/m²     Physical Exam  Vitals and nursing note reviewed.   Constitutional:       General: She is not in acute distress.     Appearance: Normal appearance. She is not ill-appearing.   HENT:      Head: Normocephalic.      Right Ear: Tympanic membrane, ear canal and external ear normal.      Left Ear: Tympanic membrane, ear canal and external ear normal.      Nose: Nose normal.      Mouth/Throat:      Mouth: Mucous " membranes are moist.      Pharynx: Oropharynx is clear.   Eyes:      Pupils: Pupils are equal, round, and reactive to light.   Cardiovascular:      Rate and Rhythm: Normal rate and regular rhythm.      Pulses: Normal pulses.      Heart sounds: Normal heart sounds. No murmur heard.  Pulmonary:      Effort: Pulmonary effort is normal. No respiratory distress.      Breath sounds: Normal breath sounds. No wheezing or rhonchi.   Musculoskeletal:      Cervical back: Normal range of motion and neck supple.   Lymphadenopathy:      Cervical: No cervical adenopathy.   Skin:     General: Skin is warm.      Capillary Refill: Capillary refill takes less than 2 seconds.      Findings: Rash present. Rash is papular.      Comments: Location is distal anterior forearms. Macular papular, dry, erythematous.    Neurological:      General: No focal deficit present.      Mental Status: She is alert.   Psychiatric:         Mood and Affect: Mood normal.         Thought Content: Thought content normal.         Judgment: Judgment normal.       Anabella Wade,

## 2024-01-13 DIAGNOSIS — Z00.6 ENCOUNTER FOR EXAMINATION FOR NORMAL COMPARISON OR CONTROL IN CLINICAL RESEARCH PROGRAM: ICD-10-CM

## 2024-01-24 ENCOUNTER — APPOINTMENT (OUTPATIENT)
Age: 64
End: 2024-01-24
Payer: COMMERCIAL

## 2024-01-24 DIAGNOSIS — I10 PRIMARY HYPERTENSION: ICD-10-CM

## 2024-01-24 DIAGNOSIS — R73.09 ABNORMAL GLUCOSE: ICD-10-CM

## 2024-01-24 DIAGNOSIS — Z00.6 ENCOUNTER FOR EXAMINATION FOR NORMAL COMPARISON OR CONTROL IN CLINICAL RESEARCH PROGRAM: ICD-10-CM

## 2024-01-24 DIAGNOSIS — F31.81 MODERATE BIPOLAR II DISORDER, MAJOR DEPRESSIVE EPISODE, IN FULL REMISSION, WITH MIXED FEATURES (HCC): ICD-10-CM

## 2024-01-24 DIAGNOSIS — K21.9 GASTROESOPHAGEAL REFLUX DISEASE WITHOUT ESOPHAGITIS: ICD-10-CM

## 2024-01-24 LAB
ANION GAP SERPL CALCULATED.3IONS-SCNC: 6 MMOL/L
BUN SERPL-MCNC: 8 MG/DL (ref 5–25)
CALCIUM SERPL-MCNC: 9.6 MG/DL (ref 8.4–10.2)
CHLORIDE SERPL-SCNC: 100 MMOL/L (ref 96–108)
CO2 SERPL-SCNC: 35 MMOL/L (ref 21–32)
CREAT SERPL-MCNC: 0.78 MG/DL (ref 0.6–1.3)
EST. AVERAGE GLUCOSE BLD GHB EST-MCNC: 131 MG/DL
GFR SERPL CREATININE-BSD FRML MDRD: 82 ML/MIN/1.73SQ M
GLUCOSE P FAST SERPL-MCNC: 112 MG/DL (ref 65–99)
HBA1C MFR BLD: 6.2 %
POTASSIUM SERPL-SCNC: 4.5 MMOL/L (ref 3.5–5.3)
SODIUM SERPL-SCNC: 141 MMOL/L (ref 135–147)

## 2024-01-24 PROCEDURE — 80048 BASIC METABOLIC PNL TOTAL CA: CPT

## 2024-01-24 PROCEDURE — 36415 COLL VENOUS BLD VENIPUNCTURE: CPT

## 2024-01-24 PROCEDURE — 83036 HEMOGLOBIN GLYCOSYLATED A1C: CPT

## 2024-01-24 RX ORDER — PANTOPRAZOLE SODIUM 40 MG/1
40 TABLET, DELAYED RELEASE ORAL 2 TIMES DAILY
Qty: 90 TABLET | Refills: 3 | Status: SHIPPED | OUTPATIENT
Start: 2024-01-24

## 2024-01-24 RX ORDER — TIRZEPATIDE 2.5 MG/.5ML
2.5 INJECTION, SOLUTION SUBCUTANEOUS WEEKLY
Qty: 2 ML | Refills: 0 | Status: SHIPPED | OUTPATIENT
Start: 2024-01-24 | End: 2024-01-26

## 2024-01-24 RX ORDER — DESVENLAFAXINE SUCCINATE 50 MG/1
50 TABLET, EXTENDED RELEASE ORAL DAILY
Qty: 90 TABLET | Refills: 3 | Status: SHIPPED | OUTPATIENT
Start: 2024-01-24 | End: 2025-01-18

## 2024-01-24 NOTE — TELEPHONE ENCOUNTER
Patient called needing refills for Pristiq and loartan to [facility] mailorder pharmacy 90 day supplies. Patient also questioning if you would prescribe Zepbound? States she can not get the wegoy anywhere and would like to try something. Zepbound would  need to go to [facility]. Please advise.

## 2024-01-26 ENCOUNTER — TELEPHONE (OUTPATIENT)
Dept: FAMILY MEDICINE CLINIC | Facility: CLINIC | Age: 64
End: 2024-01-26

## 2024-01-26 NOTE — TELEPHONE ENCOUNTER
Zepbound denied by insurance, patient wondering if you would call [facility] in to [facility]? States she has heard of another employee getting it and thinks it will be covered.

## 2024-01-30 DIAGNOSIS — I10 PRIMARY HYPERTENSION: ICD-10-CM

## 2024-01-30 RX ORDER — LOSARTAN POTASSIUM 25 MG/1
25 TABLET ORAL DAILY
Qty: 90 TABLET | Refills: 3 | Status: SHIPPED | OUTPATIENT
Start: 2024-01-30

## 2024-01-31 DIAGNOSIS — R73.09 ABNORMAL GLUCOSE: Primary | ICD-10-CM

## 2024-01-31 RX ORDER — METFORMIN HYDROCHLORIDE 500 MG/1
500 TABLET, EXTENDED RELEASE ORAL
Qty: 30 TABLET | Refills: 1 | Status: SHIPPED | OUTPATIENT
Start: 2024-01-31

## 2024-02-15 ENCOUNTER — CLINICAL SUPPORT (OUTPATIENT)
Dept: FAMILY MEDICINE CLINIC | Facility: CLINIC | Age: 64
End: 2024-02-15
Payer: COMMERCIAL

## 2024-02-15 DIAGNOSIS — Z23 ENCOUNTER FOR IMMUNIZATION: Primary | ICD-10-CM

## 2024-02-15 PROCEDURE — 90471 IMMUNIZATION ADMIN: CPT | Performed by: FAMILY MEDICINE

## 2024-02-15 PROCEDURE — 90750 HZV VACC RECOMBINANT IM: CPT | Performed by: FAMILY MEDICINE

## 2024-02-18 LAB
APOB+LDLR+PCSK9 GENE MUT ANL BLD/T: NOT DETECTED
BRCA1+BRCA2 DEL+DUP + FULL MUT ANL BLD/T: NOT DETECTED
MLH1+MSH2+MSH6+PMS2 GN DEL+DUP+FUL M: NOT DETECTED

## 2024-02-26 ENCOUNTER — OFFICE VISIT (OUTPATIENT)
Dept: FAMILY MEDICINE CLINIC | Facility: CLINIC | Age: 64
End: 2024-02-26
Payer: COMMERCIAL

## 2024-02-26 VITALS
DIASTOLIC BLOOD PRESSURE: 72 MMHG | BODY MASS INDEX: 36.25 KG/M2 | OXYGEN SATURATION: 96 % | WEIGHT: 192 LBS | TEMPERATURE: 99 F | HEIGHT: 61 IN | HEART RATE: 72 BPM | SYSTOLIC BLOOD PRESSURE: 138 MMHG

## 2024-02-26 DIAGNOSIS — S05.02XA ABRASION OF LEFT CORNEA, INITIAL ENCOUNTER: Primary | ICD-10-CM

## 2024-02-26 PROCEDURE — 99212 OFFICE O/P EST SF 10 MIN: CPT | Performed by: FAMILY MEDICINE

## 2024-02-26 NOTE — PROGRESS NOTES
Name: Tati Higuera      : 1960      MRN: [id number]  Encounter Provider: Mehnaz Golden MD  Encounter Date: 2024   Encounter department: [facility]    Assessment & Plan     1. Abrasion of left cornea, initial encounter  Comments:  tetracycline drops x 2 with patch for 24 hrs   recheck then           Subjective      Patient with eye pain since yesterday.  Feels she possibly scratched it with mascara or got some dust in her eye.  Flushed eyes several times therefore feels a foreign body was present it is still but still with persistent pain and some clear drainage and tearing.  She denies any visual changes.      Review of Systems    Current Outpatient Medications on File Prior to Visit   Medication Sig   • albuterol (ProAir HFA) 90 mcg/act inhaler Inhale 2 puffs every 6 (six) hours as needed for wheezing   • benzonatate (TESSALON) 200 MG capsule Take 200 mg by mouth 3 (three) times a day as needed   • cyclobenzaprine (FLEXERIL) 10 mg tablet Take 1 tablet (10 mg total) by mouth 3 (three) times a day as needed for muscle spasms   • desvenlafaxine succinate (PRISTIQ) 50 mg 24 hr tablet Take 1 tablet (50 mg total) by mouth daily   • divalproex sodium (Depakote) 500 mg DR tablet 2 tablets PO @ HS   • fluticasone (FLONASE) 50 mcg/act nasal spray 1 spray into each nostril daily   • hydrochlorothiazide (HYDRODIURIL) 25 mg tablet Take 25 mg by mouth daily   • hydrOXYzine HCL (ATARAX) 25 mg tablet Take 1 tablet (25 mg total) by mouth every 6 (six) hours as needed for itching   • liraglutide (SAXENDA) injection Inject 0.1 mL (0.6 mg total) under the skin daily for 7 days, THEN 0.2 mL (1.2 mg total) daily for 7 days, THEN 0.3 mL (1.8 mg total) daily for 7 days, THEN 0.4 mL (2.4 mg total) daily for 7 days, THEN 0.5 mL (3 mg total) daily.   • losartan (COZAAR) 25 mg tablet Take 1 tablet (25 mg total) by mouth daily   • metFORMIN (GLUCOPHAGE-XR) 500 mg 24 hr tablet Take 1 tablet (500  "mg total) by mouth daily with dinner   • pantoprazole (PROTONIX) 40 mg tablet Take 1 tablet (40 mg total) by mouth 2 (two) times a day   • triamcinolone (KENALOG) 0.1 % cream Apply topically 2 (two) times a day       Objective     /72 (BP Location: Left arm, Patient Position: Sitting, Cuff Size: Large)   Pulse 72   Temp 99 °F (37.2 °C) (Tympanic)   Ht 5' 1\" (1.549 m)   Wt 87.1 kg (192 lb)   SpO2 96%   BMI 36.28 kg/m²     Physical Exam  Eyes:      General: Lids are normal. Vision grossly intact.         Left eye: No discharge or hordeolum.      Extraocular Movements: Extraocular movements intact.      Conjunctiva/sclera:      Left eye: Left conjunctiva is injected.       Procedures    Mehnaz Golden MD    "

## 2024-03-11 ENCOUNTER — OFFICE VISIT (OUTPATIENT)
Dept: FAMILY MEDICINE CLINIC | Facility: CLINIC | Age: 64
End: 2024-03-11
Payer: COMMERCIAL

## 2024-03-11 VITALS
HEART RATE: 75 BPM | BODY MASS INDEX: 36.55 KG/M2 | HEIGHT: 61 IN | SYSTOLIC BLOOD PRESSURE: 136 MMHG | WEIGHT: 193.6 LBS | OXYGEN SATURATION: 95 % | DIASTOLIC BLOOD PRESSURE: 88 MMHG | TEMPERATURE: 95.8 F

## 2024-03-11 DIAGNOSIS — R05.2 SUBACUTE COUGH: Primary | ICD-10-CM

## 2024-03-11 DIAGNOSIS — R09.81 NASAL CONGESTION: ICD-10-CM

## 2024-03-11 DIAGNOSIS — K21.00 GASTROESOPHAGEAL REFLUX DISEASE WITH ESOPHAGITIS WITHOUT HEMORRHAGE: ICD-10-CM

## 2024-03-11 PROCEDURE — 99214 OFFICE O/P EST MOD 30 MIN: CPT | Performed by: FAMILY MEDICINE

## 2024-03-11 RX ORDER — FLUTICASONE PROPIONATE AND SALMETEROL 250; 50 UG/1; UG/1
1 POWDER RESPIRATORY (INHALATION) 2 TIMES DAILY
Qty: 60 BLISTER | Refills: 1 | Status: SHIPPED | OUTPATIENT
Start: 2024-03-11

## 2024-03-11 RX ORDER — SUCRALFATE 1 G/1
1 TABLET ORAL 4 TIMES DAILY
Qty: 120 TABLET | Refills: 1 | Status: SHIPPED | OUTPATIENT
Start: 2024-03-11

## 2024-03-11 NOTE — ASSESSMENT & PLAN NOTE
Incr PPI therapy to BID. Add Carafate QAC and QHS. If no better in 2 weeks, let me know, will refer to GI.

## 2024-03-11 NOTE — PROGRESS NOTES
Name: Tati Higuera      : 1960      MRN: [id number]  Encounter Provider: Anabella Wade DO  Encounter Date: 3/11/2024   Encounter department: [facility] PRIMARY CARE    Assessment & Plan     1. Subacute cough  Assessment & Plan:  Has order for LDCT chest since +smoker. If this was not in there near future, I would order CXR. Add maintenance inh BID x 1 month, albuterol as needed. Encouraged smoking cessation.     Orders:  -     Fluticasone-Salmeterol (Advair Diskus) 250-50 mcg/dose inhaler; Inhale 1 puff 2 (two) times a day Rinse mouth after use.    2. Nasal congestion  Assessment & Plan:  Re add Flonase.       3. Gastroesophageal reflux disease with esophagitis without hemorrhage  Assessment & Plan:  Incr PPI therapy to BID. Add Carafate QAC and QHS. If no better in 2 weeks, let me know, will refer to GI.     Orders:  -     sucralfate (CARAFATE) 1 g tablet; Take 1 tablet (1 g total) by mouth 4 (four) times a day         Subjective      Multi c/o-- initially had metformin related diarrhea upon initiation of med several months ago. Stopped the metformin after about 3 weeks 2/2 GI upset. But diarrhea has persisted; med was dc/d' 2 months ago. Diarrhea 3-4x daily, occasionally has some form, mostly liquid. Drinking water seems to make symptoms much worse? Iced tea, powerade all ok, just not water. Any food is causing diarrhea shortly after she eats. Had some abd pain while taking the metformin but no longer has cramping. Nausea started about 2 weeks ago, appears unrelated to metformin. Worse in a.m., better by the afternoon. Light breakfast, regular lunch then regular dinner. No vomiting. No fevers. No weight loss. Denies typical GERD symptoms.    Also c/o cough, sneezing, nasal congestion and PND since COVID in Dec. Cough varies, sometimes dry and sometimes productive. +Smoker. No h/o seasonal allergies. Not smoking any more or less than prior. No SOB. No audible wheezing or  fevers.         Review of Systems   Constitutional:  Negative for activity change, appetite change, fever and unexpected weight change.   HENT:  Positive for congestion, postnasal drip, rhinorrhea and sneezing. Negative for sinus pressure, sinus pain, sore throat and trouble swallowing.    Eyes:  Negative for itching.   Respiratory:  Positive for cough. Negative for shortness of breath, wheezing and stridor.    Cardiovascular:  Negative for chest pain, palpitations and leg swelling.   Gastrointestinal:  Negative for abdominal pain.   Musculoskeletal:  Positive for neck pain.   Allergic/Immunologic: Negative for environmental allergies, food allergies and immunocompromised state.   Neurological:  Negative for dizziness, light-headedness and headaches.       Current Outpatient Medications on File Prior to Visit   Medication Sig   • cyclobenzaprine (FLEXERIL) 10 mg tablet Take 1 tablet (10 mg total) by mouth 3 (three) times a day as needed for muscle spasms   • desvenlafaxine succinate (PRISTIQ) 50 mg 24 hr tablet Take 1 tablet (50 mg total) by mouth daily   • divalproex sodium (Depakote) 500 mg DR tablet 2 tablets PO @ HS   • hydrochlorothiazide (HYDRODIURIL) 25 mg tablet Take 25 mg by mouth daily   • hydrOXYzine HCL (ATARAX) 25 mg tablet Take 1 tablet (25 mg total) by mouth every 6 (six) hours as needed for itching   • losartan (COZAAR) 25 mg tablet Take 1 tablet (25 mg total) by mouth daily   • pantoprazole (PROTONIX) 40 mg tablet Take 1 tablet (40 mg total) by mouth 2 (two) times a day   • triamcinolone (KENALOG) 0.1 % cream Apply topically 2 (two) times a day   • albuterol (ProAir HFA) 90 mcg/act inhaler Inhale 2 puffs every 6 (six) hours as needed for wheezing (Patient not taking: Reported on 3/11/2024)   • fluticasone (FLONASE) 50 mcg/act nasal spray 1 spray into each nostril daily (Patient not taking: Reported on 3/11/2024)   • [DISCONTINUED] benzonatate (TESSALON) 200 MG capsule Take 200 mg by mouth 3 (three)  "times a day as needed (Patient not taking: Reported on 3/11/2024)   • [DISCONTINUED] liraglutide (SAXENDA) injection Inject 0.1 mL (0.6 mg total) under the skin daily for 7 days, THEN 0.2 mL (1.2 mg total) daily for 7 days, THEN 0.3 mL (1.8 mg total) daily for 7 days, THEN 0.4 mL (2.4 mg total) daily for 7 days, THEN 0.5 mL (3 mg total) daily. (Patient not taking: Reported on 3/11/2024)   • [DISCONTINUED] metFORMIN (GLUCOPHAGE-XR) 500 mg 24 hr tablet Take 1 tablet (500 mg total) by mouth daily with dinner (Patient not taking: Reported on 3/11/2024)       Objective     /88 (BP Location: Left arm, Patient Position: Sitting, Cuff Size: Large)   Pulse 75   Temp (!) 95.8 °F (35.4 °C) (Tympanic)   Ht 5' 1\" (1.549 m)   Wt 87.8 kg (193 lb 9.6 oz)   SpO2 95%   BMI 36.58 kg/m²     Physical Exam  Vitals and nursing note reviewed.   Constitutional:       General: She is not in acute distress.     Appearance: Normal appearance. She is not ill-appearing.   HENT:      Head: Normocephalic.      Right Ear: Tympanic membrane, ear canal and external ear normal.      Left Ear: Tympanic membrane, ear canal and external ear normal.      Nose: Nose normal.      Mouth/Throat:      Mouth: Mucous membranes are moist.      Pharynx: Oropharynx is clear.   Eyes:      Pupils: Pupils are equal, round, and reactive to light.   Cardiovascular:      Rate and Rhythm: Normal rate and regular rhythm.      Pulses: Normal pulses.      Heart sounds: Normal heart sounds. No murmur heard.  Pulmonary:      Effort: Pulmonary effort is normal. No respiratory distress.      Breath sounds: Normal breath sounds. No wheezing or rhonchi.   Musculoskeletal:      Cervical back: Normal range of motion and neck supple.   Lymphadenopathy:      Cervical: No cervical adenopathy.   Skin:     General: Skin is warm.      Capillary Refill: Capillary refill takes less than 2 seconds.   Neurological:      General: No focal deficit present.      Mental Status: She is " alert.   Psychiatric:         Mood and Affect: Mood normal.         Thought Content: Thought content normal.         Judgment: Judgment normal.       Anabella Wade, DO

## 2024-03-11 NOTE — ASSESSMENT & PLAN NOTE
Has order for LDCT chest since +smoker. If this was not in there near future, I would order CXR. Add maintenance inh BID x 1 month, albuterol as needed. Encouraged smoking cessation.

## 2024-03-15 ENCOUNTER — HOSPITAL ENCOUNTER (OUTPATIENT)
Dept: CT IMAGING | Facility: HOSPITAL | Age: 64
End: 2024-03-15
Attending: FAMILY MEDICINE
Payer: COMMERCIAL

## 2024-03-15 DIAGNOSIS — F17.200 TOBACCO DEPENDENCE: ICD-10-CM

## 2024-03-15 PROCEDURE — 71271 CT THORAX LUNG CANCER SCR C-: CPT

## 2024-04-03 DIAGNOSIS — E66.09 CLASS 1 OBESITY DUE TO EXCESS CALORIES WITHOUT SERIOUS COMORBIDITY WITH BODY MASS INDEX (BMI) OF 34.0 TO 34.9 IN ADULT: Primary | ICD-10-CM

## 2024-04-03 DIAGNOSIS — M54.2 NECK PAIN: ICD-10-CM

## 2024-04-03 RX ORDER — SEMAGLUTIDE 0.25 MG/.5ML
0.25 INJECTION, SOLUTION SUBCUTANEOUS WEEKLY
COMMUNITY
End: 2024-04-03 | Stop reason: SDUPTHER

## 2024-04-03 RX ORDER — CYCLOBENZAPRINE HCL 10 MG
10 TABLET ORAL 3 TIMES DAILY PRN
Qty: 90 TABLET | Refills: 5 | Status: SHIPPED | OUTPATIENT
Start: 2024-04-03

## 2024-04-03 RX ORDER — SEMAGLUTIDE 0.25 MG/.5ML
0.25 INJECTION, SOLUTION SUBCUTANEOUS WEEKLY
Qty: 2 ML | Refills: 5 | Status: SHIPPED | OUTPATIENT
Start: 2024-04-03 | End: 2024-05-03

## 2024-04-09 ENCOUNTER — TELEPHONE (OUTPATIENT)
Dept: FAMILY MEDICINE CLINIC | Facility: CLINIC | Age: 64
End: 2024-04-09

## 2024-04-09 DIAGNOSIS — R11.0 NAUSEA: Primary | ICD-10-CM

## 2024-04-09 RX ORDER — ONDANSETRON 4 MG/1
4 TABLET, ORALLY DISINTEGRATING ORAL EVERY 6 HOURS PRN
Qty: 20 TABLET | Refills: 2 | Status: SHIPPED | OUTPATIENT
Start: 2024-04-09

## 2024-04-09 NOTE — TELEPHONE ENCOUNTER
I will call in Zofran. Try using it the first 2-3 days of the injection. May cause constipation. See me in about a month.

## 2024-04-24 ENCOUNTER — RA CDI HCC (OUTPATIENT)
Dept: OTHER | Facility: HOSPITAL | Age: 64
End: 2024-04-24

## 2024-04-30 ENCOUNTER — OFFICE VISIT (OUTPATIENT)
Dept: FAMILY MEDICINE CLINIC | Facility: CLINIC | Age: 64
End: 2024-04-30
Payer: COMMERCIAL

## 2024-04-30 VITALS
BODY MASS INDEX: 35.53 KG/M2 | HEIGHT: 61 IN | WEIGHT: 188.2 LBS | TEMPERATURE: 96.1 F | HEART RATE: 64 BPM | DIASTOLIC BLOOD PRESSURE: 68 MMHG | SYSTOLIC BLOOD PRESSURE: 128 MMHG

## 2024-04-30 DIAGNOSIS — E78.2 MIXED HYPERLIPIDEMIA: ICD-10-CM

## 2024-04-30 DIAGNOSIS — E66.09 CLASS 2 OBESITY DUE TO EXCESS CALORIES WITHOUT SERIOUS COMORBIDITY WITH BODY MASS INDEX (BMI) OF 35.0 TO 35.9 IN ADULT: Primary | ICD-10-CM

## 2024-04-30 PROBLEM — E66.812 CLASS 2 OBESITY DUE TO EXCESS CALORIES WITHOUT SERIOUS COMORBIDITY WITH BODY MASS INDEX (BMI) OF 35.0 TO 35.9 IN ADULT: Status: ACTIVE | Noted: 2023-08-02

## 2024-04-30 PROCEDURE — 99212 OFFICE O/P EST SF 10 MIN: CPT | Performed by: FAMILY MEDICINE

## 2024-04-30 PROCEDURE — 3725F SCREEN DEPRESSION PERFORMED: CPT | Performed by: FAMILY MEDICINE

## 2024-04-30 NOTE — PROGRESS NOTES
Name: Tati Higuera      : 1960      MRN: [id number]  Encounter Provider: Anabella Wade DO  Encounter Date: 2024   Encounter department: [facility] PRIMARY CARE    Assessment & Plan     1. Class 2 obesity due to excess calories without serious comorbidity with body mass index (BMI) of 35.0 to 35.9 in adult  Assessment & Plan:  Will increase wegovy to 0.5mg for the next month can increase dosage as tolerated.     Orders:  -     Semaglutide-Weight Management (WEGOVY) 0.5 MG/0.5ML; Inject 0.5 mL (0.5 mg total) under the skin once a week    2. Mixed hyperlipidemia  -     Lipid panel; Future; Expected date: 2024         Subjective      Obesity-  feeling pretty well on the wegovy. Has given herself 4 injections so far. Has lost 5 lbs, notable decr in appetite. Nausea 1st few days, no sig changes in bowel habits. No vomiting or constipation. Ok to increase dosage next month.       Review of Systems   Constitutional:  Negative for chills and fever.   HENT:  Negative for ear pain and sore throat.    Eyes:  Negative for pain and visual disturbance.   Respiratory:  Negative for cough and shortness of breath.    Cardiovascular:  Negative for chest pain and palpitations.   Gastrointestinal:  Negative for abdominal pain and vomiting.   Genitourinary:  Negative for dysuria and hematuria.   Musculoskeletal:  Negative for arthralgias and back pain.   Skin:  Negative for color change and rash.   Neurological:  Negative for seizures and syncope.   All other systems reviewed and are negative.      Current Outpatient Medications on File Prior to Visit   Medication Sig   • cyclobenzaprine (FLEXERIL) 10 mg tablet Take 1 tablet (10 mg total) by mouth 3 (three) times a day as needed for muscle spasms   • desvenlafaxine succinate (PRISTIQ) 50 mg 24 hr tablet Take 1 tablet (50 mg total) by mouth daily   • divalproex sodium (Depakote) 500 mg DR tablet 2 tablets PO @ HS   • Fluticasone-Salmeterol  "(Advair Diskus) 250-50 mcg/dose inhaler Inhale 1 puff 2 (two) times a day Rinse mouth after use.   • hydrochlorothiazide (HYDRODIURIL) 25 mg tablet Take 25 mg by mouth daily   • hydrOXYzine HCL (ATARAX) 25 mg tablet Take 1 tablet (25 mg total) by mouth every 6 (six) hours as needed for itching   • losartan (COZAAR) 25 mg tablet Take 1 tablet (25 mg total) by mouth daily   • ondansetron (ZOFRAN-ODT) 4 mg disintegrating tablet Take 1 tablet (4 mg total) by mouth every 6 (six) hours as needed for nausea or vomiting   • pantoprazole (PROTONIX) 40 mg tablet Take 1 tablet (40 mg total) by mouth 2 (two) times a day   • Semaglutide-Weight Management (Wegovy) 0.25 MG/0.5ML Inject 0.5 mL (0.25 mg total) under the skin once a week   • triamcinolone (KENALOG) 0.1 % cream Apply topically 2 (two) times a day   • albuterol (ProAir HFA) 90 mcg/act inhaler Inhale 2 puffs every 6 (six) hours as needed for wheezing (Patient not taking: Reported on 3/11/2024)   • sucralfate (CARAFATE) 1 g tablet Take 1 tablet (1 g total) by mouth 4 (four) times a day   • [DISCONTINUED] fluticasone (FLONASE) 50 mcg/act nasal spray 1 spray into each nostril daily (Patient not taking: Reported on 3/11/2024)       Objective     /68 (BP Location: Left arm, Patient Position: Sitting, Cuff Size: Large)   Pulse 64   Temp (!) 96.1 °F (35.6 °C) (Tympanic)   Ht 5' 1\" (1.549 m)   Wt 85.4 kg (188 lb 3.2 oz)   BMI 35.56 kg/m²     Physical Exam  Vitals and nursing note reviewed.   Constitutional:       Appearance: Normal appearance. She is normal weight.   Musculoskeletal:         General: Normal range of motion.   Skin:     General: Skin is warm.      Capillary Refill: Capillary refill takes less than 2 seconds.   Neurological:      General: No focal deficit present.      Mental Status: She is alert. Mental status is at baseline.   Psychiatric:         Mood and Affect: Mood normal.         Behavior: Behavior normal.         Thought Content: Thought " content normal.         Judgment: Judgment normal.       Anabella Wade, DO

## 2024-05-01 ENCOUNTER — TELEPHONE (OUTPATIENT)
Dept: FAMILY MEDICINE CLINIC | Facility: CLINIC | Age: 64
End: 2024-05-01

## 2024-05-01 PROBLEM — R05.2 SUBACUTE COUGH: Status: RESOLVED | Noted: 2024-03-11 | Resolved: 2024-05-01

## 2024-05-08 DIAGNOSIS — B37.9 YEAST INFECTION: ICD-10-CM

## 2024-05-08 RX ORDER — FLUCONAZOLE 150 MG/1
TABLET ORAL
Qty: 1 TABLET | Refills: 0 | Status: SHIPPED | OUTPATIENT
Start: 2024-05-08 | End: 2024-05-09

## 2024-05-28 ENCOUNTER — OFFICE VISIT (OUTPATIENT)
Dept: FAMILY MEDICINE CLINIC | Facility: CLINIC | Age: 64
End: 2024-05-28
Payer: COMMERCIAL

## 2024-05-28 VITALS
WEIGHT: 183 LBS | HEIGHT: 61 IN | DIASTOLIC BLOOD PRESSURE: 80 MMHG | SYSTOLIC BLOOD PRESSURE: 122 MMHG | TEMPERATURE: 98 F | HEART RATE: 77 BPM | OXYGEN SATURATION: 98 % | BODY MASS INDEX: 34.55 KG/M2

## 2024-05-28 DIAGNOSIS — E66.09 CLASS 2 OBESITY DUE TO EXCESS CALORIES WITHOUT SERIOUS COMORBIDITY WITH BODY MASS INDEX (BMI) OF 35.0 TO 35.9 IN ADULT: Primary | ICD-10-CM

## 2024-05-28 PROCEDURE — 99212 OFFICE O/P EST SF 10 MIN: CPT | Performed by: FAMILY MEDICINE

## 2024-05-28 RX ORDER — SEMAGLUTIDE 1 MG/.5ML
INJECTION, SOLUTION SUBCUTANEOUS
Qty: 2 ML | Refills: 2 | Status: SHIPPED | OUTPATIENT
Start: 2024-05-28

## 2024-05-28 NOTE — PROGRESS NOTES
"Ambulatory Visit  Name: Tati Higuera      : 1960      MRN: [id number]  Encounter Provider: Anabella Wade DO  Encounter Date: 2024   Encounter department: [facility] PRIMARY CARE    Assessment & Plan   1. Class 2 obesity due to excess calories without serious comorbidity with body mass index (BMI) of 35.0 to 35.9 in adult  Assessment & Plan:  Will increase Wegovy to 1.0 mg weekly with her next refill.  Patient has standing orders for lipid panel and A1c is rechecked.  Encouraged in her efforts to watch diet, lose weight.  Incorporate more exercise if able.  Follow-up as needed.   Orders:  -     Semaglutide-Weight Management (Wegovy) 1 MG/0.5ML; Inject 1 mg under the skin weekly     History of Present Illness     Obesity-   patient here for follow-up today, has been taking Wegovy for roughly 8 weeks now.  Started at 0.25 mg weekly x 4 weeks, then increase to 0.5 mg weekly for the past month.  She has lost approximately 10 pounds in the past 8 to 10 weeks.  Patient does complain of intermittent nausea secondary to the medication, no other significant side effects.  Has Zofran to use as needed.  Bowel habits normal.  Ready to increase Wegovy dose again.        Review of Systems   Constitutional:  Negative for activity change, appetite change, chills, fatigue, fever and unexpected weight change.   Respiratory:  Negative for cough and shortness of breath.    Cardiovascular:  Negative for chest pain.   Gastrointestinal:  Positive for nausea. Negative for abdominal distention, abdominal pain, blood in stool, constipation, diarrhea and vomiting.   Neurological:  Negative for dizziness.       Objective     /80 (BP Location: Right arm, Patient Position: Sitting, Cuff Size: Large)   Pulse 77   Temp 98 °F (36.7 °C) (Tympanic)   Ht 5' 1\" (1.549 m)   Wt 83 kg (183 lb)   SpO2 98%   BMI 34.58 kg/m²     Physical Exam  Vitals and nursing note reviewed.   Constitutional:       " General: She is not in acute distress.     Appearance: Normal appearance. She is normal weight. She is not ill-appearing or toxic-appearing.   HENT:      Head: Normocephalic.   Musculoskeletal:         General: Normal range of motion.   Skin:     General: Skin is warm.   Neurological:      General: No focal deficit present.      Mental Status: She is alert and oriented to person, place, and time. Mental status is at baseline.      Cranial Nerves: No cranial nerve deficit.   Psychiatric:         Attention and Perception: Attention and perception normal.         Mood and Affect: Mood normal.         Speech: Speech normal.         Behavior: Behavior normal. Behavior is cooperative.         Thought Content: Thought content normal.         Cognition and Memory: Cognition and memory normal.         Judgment: Judgment normal.       Administrative Statements

## 2024-05-28 NOTE — ASSESSMENT & PLAN NOTE
Will increase Wegovy to 1.0 mg weekly with her next refill.  Patient has standing orders for lipid panel and A1c is rechecked.  Encouraged in her efforts to watch diet, lose weight.  Incorporate more exercise if able.  Follow-up as needed.

## 2024-06-05 ENCOUNTER — OFFICE VISIT (OUTPATIENT)
Dept: FAMILY MEDICINE CLINIC | Facility: CLINIC | Age: 64
End: 2024-06-05
Payer: COMMERCIAL

## 2024-06-05 VITALS
HEART RATE: 86 BPM | SYSTOLIC BLOOD PRESSURE: 126 MMHG | HEIGHT: 61 IN | WEIGHT: 180.2 LBS | TEMPERATURE: 95.9 F | BODY MASS INDEX: 34.02 KG/M2 | DIASTOLIC BLOOD PRESSURE: 86 MMHG

## 2024-06-05 DIAGNOSIS — G44.89 OTHER HEADACHE SYNDROME: Primary | ICD-10-CM

## 2024-06-05 DIAGNOSIS — M54.2 CERVICALGIA: ICD-10-CM

## 2024-06-05 PROCEDURE — 96372 THER/PROPH/DIAG INJ SC/IM: CPT | Performed by: NURSE PRACTITIONER

## 2024-06-05 PROCEDURE — 99213 OFFICE O/P EST LOW 20 MIN: CPT | Performed by: NURSE PRACTITIONER

## 2024-06-05 RX ORDER — KETOROLAC TROMETHAMINE 30 MG/ML
60 INJECTION, SOLUTION INTRAMUSCULAR; INTRAVENOUS ONCE
Status: COMPLETED | OUTPATIENT
Start: 2024-06-05 | End: 2024-06-05

## 2024-06-05 RX ORDER — DEXAMETHASONE SODIUM PHOSPHATE 10 MG/ML
10 INJECTION INTRAMUSCULAR; INTRAVENOUS ONCE
Status: COMPLETED | OUTPATIENT
Start: 2024-06-05 | End: 2024-06-05

## 2024-06-05 RX ADMIN — KETOROLAC TROMETHAMINE 60 MG: 30 INJECTION, SOLUTION INTRAMUSCULAR; INTRAVENOUS at 14:07

## 2024-06-05 RX ADMIN — DEXAMETHASONE SODIUM PHOSPHATE 10 MG: 10 INJECTION INTRAMUSCULAR; INTRAVENOUS at 14:07

## 2024-06-05 NOTE — PROGRESS NOTES
"Ambulatory Visit  Name: Tati Higuera      : 1960      MRN: [id number]  Encounter Provider: HEIDE Smith  Encounter Date: 2024   Encounter department: [facility] PRIMARY CARE    Assessment & Plan   1. Other headache syndrome  Comments:  Due to neck pain versus med side effect, follow-up if unresolved  Orders:  -     dexamethasone (DECADRON) injection 10 mg  -     ketorolac (TORADOL) 60 mg/2 mL IM injection 60 mg  2. Cervicalgia  -     dexamethasone (DECADRON) injection 10 mg  -     ketorolac (TORADOL) 60 mg/2 mL IM injection 60 mg       History of Present Illness   {Disappearing Hyperlinks I Encounters * My Last Note * Since Last Visit * History :92482}  Patient is here with a headache for about 2 days.  Denies URI cold symptoms stress or injury.  She does have a history of headaches now and then.  Never an official migraine diagnosis.  Sometimes headaches are associated with a chronic neck pain that she gets with a history of muscle spasm and DDD of her neck.  I also note in chart review that she recently had her Ozempic increased and that that can cause headaches too  Headache was preceded by some mild neck pain she felt like she might of slept on her neck        Review of Systems    Objective   {Disappearing Hyperlinks   Review Vitals * Enter New Vitals * Results Review * Labs * Imaging * Cardiology * Procedures * Lung Cancer Screening :46647}  /86 (BP Location: Left arm, Patient Position: Sitting, Cuff Size: Adult)   Pulse 86   Temp (!) 95.9 °F (35.5 °C) (Tympanic)   Ht 5' 1\" (1.549 m)   Wt 81.7 kg (180 lb 3.2 oz)   BMI 34.05 kg/m²     Physical Exam  Constitutional:       General: She is not in acute distress.     Appearance: Normal appearance. She is not ill-appearing.   Neck:      Comments: Good range of motion no tenderness along C-spine.  Cardiovascular:      Rate and Rhythm: Normal rate and regular rhythm.   Musculoskeletal:      Cervical back: Tenderness " (Mild with spasm left posterior cervical muscles) present.   Psychiatric:         Mood and Affect: Mood normal.       Administrative Statements {Disappearing Hyperlinks I  Level of Service * [facility]}

## 2024-06-13 ENCOUNTER — OFFICE VISIT (OUTPATIENT)
Dept: FAMILY MEDICINE CLINIC | Facility: CLINIC | Age: 64
End: 2024-06-13
Payer: COMMERCIAL

## 2024-06-13 VITALS
DIASTOLIC BLOOD PRESSURE: 82 MMHG | SYSTOLIC BLOOD PRESSURE: 118 MMHG | WEIGHT: 177 LBS | HEART RATE: 72 BPM | BODY MASS INDEX: 33.42 KG/M2 | TEMPERATURE: 96.5 F | HEIGHT: 61 IN

## 2024-06-13 DIAGNOSIS — R09.81 NASAL CONGESTION: Primary | ICD-10-CM

## 2024-06-13 DIAGNOSIS — J40 BRONCHITIS: ICD-10-CM

## 2024-06-13 DIAGNOSIS — B37.0 THRUSH: ICD-10-CM

## 2024-06-13 DIAGNOSIS — F31.81 MODERATE BIPOLAR II DISORDER, MAJOR DEPRESSIVE EPISODE, IN FULL REMISSION, WITH MIXED FEATURES (HCC): ICD-10-CM

## 2024-06-13 DIAGNOSIS — R51.9 NONINTRACTABLE HEADACHE, UNSPECIFIED CHRONICITY PATTERN, UNSPECIFIED HEADACHE TYPE: ICD-10-CM

## 2024-06-13 LAB
SARS-COV-2 AG UPPER RESP QL IA: NEGATIVE
VALID CONTROL: NORMAL

## 2024-06-13 PROCEDURE — 96372 THER/PROPH/DIAG INJ SC/IM: CPT | Performed by: NURSE PRACTITIONER

## 2024-06-13 PROCEDURE — 87811 SARS-COV-2 COVID19 W/OPTIC: CPT | Performed by: NURSE PRACTITIONER

## 2024-06-13 PROCEDURE — 99213 OFFICE O/P EST LOW 20 MIN: CPT | Performed by: NURSE PRACTITIONER

## 2024-06-13 RX ORDER — DEXAMETHASONE SODIUM PHOSPHATE 10 MG/ML
10 INJECTION INTRAMUSCULAR; INTRAVENOUS ONCE
Status: COMPLETED | OUTPATIENT
Start: 2024-06-13 | End: 2024-06-13

## 2024-06-13 RX ORDER — KETOROLAC TROMETHAMINE 30 MG/ML
60 INJECTION, SOLUTION INTRAMUSCULAR; INTRAVENOUS ONCE
Status: COMPLETED | OUTPATIENT
Start: 2024-06-13 | End: 2024-06-13

## 2024-06-13 RX ORDER — FLUCONAZOLE 100 MG/1
100 TABLET ORAL DAILY
Qty: 10 TABLET | Refills: 0 | Status: SHIPPED | OUTPATIENT
Start: 2024-06-13 | End: 2024-06-23

## 2024-06-13 RX ADMIN — DEXAMETHASONE SODIUM PHOSPHATE 10 MG: 10 INJECTION INTRAMUSCULAR; INTRAVENOUS at 11:48

## 2024-06-13 RX ADMIN — KETOROLAC TROMETHAMINE 60 MG: 30 INJECTION, SOLUTION INTRAMUSCULAR; INTRAVENOUS at 11:48

## 2024-06-13 NOTE — PROGRESS NOTES
"Ambulatory Visit  Name: Tati Higuera      : 1960      MRN: [id number]  Encounter Provider: HIEDE Smith  Encounter Date: 2024   Encounter department: [facility] PRIMARY CARE    Assessment & Plan   1. Nasal congestion  Comments:  Follow-up if unresolved  Orders:  -     POCT Rapid Covid Ag  2. Thrush  -     fluconazole (DIFLUCAN) 100 mg tablet; Take 1 tablet (100 mg total) by mouth daily for 10 days  3. Bronchitis  Comments:  Mild at present but hx asthmatic bronchitis  4. Nonintractable headache, unspecified chronicity pattern, unspecified headache type  -     ketorolac (TORADOL) 60 mg/2 mL IM injection 60 mg  -     dexamethasone (DECADRON) injection 10 mg  5. Moderate bipolar II disorder, major depressive episode, in full remission, with mixed features (HCC)  Comments:  stable       History of Present Illness   {Disappearing Hyperlinks I Encounters * My Last Note * Since Last Visit * History :54372}  Cold sx, mostly nasal congestion.  Sore throat sinus pressure and mild cough.  No fever chills or aches.  She did start an Advair inhaler few months ago.  She does not have a formal dose diagnosis of COPD but she is a smoker tends to get bronchitis secondary to URIs.  She started getting a bad taste in her throat of the postnasal drip that she usually gets when she is has sinusitis.  We saw her last week for headache and now wondering if that was the beginning of some type of viral syndrome.  Headache did resolve after couple days but is creeping back        Review of Systems    Objective   {Disappearing Hyperlinks   Review Vitals * Enter New Vitals * Results Review * Labs * Imaging * Cardiology * Procedures * Lung Cancer Screening :38400}  /82 (BP Location: Left arm, Patient Position: Sitting, Cuff Size: Large)   Pulse 72   Temp (!) 96.5 °F (35.8 °C) (Tympanic)   Ht 5' 1\" (1.549 m)   Wt 80.3 kg (177 lb)   BMI 33.44 kg/m²     Physical Exam  Constitutional:       General: " She is not in acute distress.  HENT:      Right Ear: Tympanic membrane normal.      Left Ear: Tympanic membrane normal.      Nose: Congestion (Slightly red) present. No rhinorrhea.      Mouth/Throat:      Comments: I do note a white coating on her tongue consistent thrush  Cardiovascular:      Rate and Rhythm: Normal rate and regular rhythm.   Pulmonary:      Effort: Pulmonary effort is normal.   Lymphadenopathy:      Cervical: No cervical adenopathy.   Psychiatric:         Mood and Affect: Mood normal.       Administrative Statements {Disappearing Hyperlinks I  Level of Service * [facility]:51692}

## 2024-06-18 ENCOUNTER — RA CDI HCC (OUTPATIENT)
Dept: OTHER | Facility: HOSPITAL | Age: 64
End: 2024-06-18

## 2024-06-24 ENCOUNTER — APPOINTMENT (OUTPATIENT)
Age: 64
End: 2024-06-24

## 2024-06-24 DIAGNOSIS — E78.2 MIXED HYPERLIPIDEMIA: ICD-10-CM

## 2024-06-24 DIAGNOSIS — Z00.8 HEALTH EXAMINATION IN POPULATION SURVEY: ICD-10-CM

## 2024-06-24 DIAGNOSIS — R73.09 ABNORMAL GLUCOSE: ICD-10-CM

## 2024-06-24 LAB
CHOLEST SERPL-MCNC: 174 MG/DL
EST. AVERAGE GLUCOSE BLD GHB EST-MCNC: 123 MG/DL
HBA1C MFR BLD: 5.9 %
HDLC SERPL-MCNC: 55 MG/DL
LDLC SERPL CALC-MCNC: 90 MG/DL (ref 0–100)
NONHDLC SERPL-MCNC: 119 MG/DL
TRIGL SERPL-MCNC: 147 MG/DL

## 2024-06-24 PROCEDURE — 80061 LIPID PANEL: CPT

## 2024-06-24 PROCEDURE — 83036 HEMOGLOBIN GLYCOSYLATED A1C: CPT

## 2024-06-24 PROCEDURE — 36415 COLL VENOUS BLD VENIPUNCTURE: CPT

## 2024-06-25 ENCOUNTER — OFFICE VISIT (OUTPATIENT)
Dept: FAMILY MEDICINE CLINIC | Facility: CLINIC | Age: 64
End: 2024-06-25
Payer: COMMERCIAL

## 2024-06-25 ENCOUNTER — APPOINTMENT (OUTPATIENT)
Age: 64
End: 2024-06-25
Payer: COMMERCIAL

## 2024-06-25 VITALS
BODY MASS INDEX: 33.46 KG/M2 | HEIGHT: 61 IN | HEART RATE: 78 BPM | TEMPERATURE: 95.6 F | OXYGEN SATURATION: 97 % | DIASTOLIC BLOOD PRESSURE: 86 MMHG | WEIGHT: 177.2 LBS | SYSTOLIC BLOOD PRESSURE: 130 MMHG

## 2024-06-25 DIAGNOSIS — K21.9 GASTROESOPHAGEAL REFLUX DISEASE WITHOUT ESOPHAGITIS: Primary | ICD-10-CM

## 2024-06-25 DIAGNOSIS — E66.09 CLASS 2 OBESITY DUE TO EXCESS CALORIES WITHOUT SERIOUS COMORBIDITY WITH BODY MASS INDEX (BMI) OF 35.0 TO 35.9 IN ADULT: ICD-10-CM

## 2024-06-25 DIAGNOSIS — E55.9 VITAMIN D DEFICIENCY: ICD-10-CM

## 2024-06-25 DIAGNOSIS — I10 PRIMARY HYPERTENSION: ICD-10-CM

## 2024-06-25 DIAGNOSIS — E53.8 B12 DEFICIENCY DUE TO DIET: ICD-10-CM

## 2024-06-25 DIAGNOSIS — F31.81 MODERATE BIPOLAR II DISORDER, MAJOR DEPRESSIVE EPISODE, IN FULL REMISSION, WITH MIXED FEATURES (HCC): ICD-10-CM

## 2024-06-25 DIAGNOSIS — R73.09 ABNORMAL GLUCOSE: ICD-10-CM

## 2024-06-25 DIAGNOSIS — R53.83 OTHER FATIGUE: ICD-10-CM

## 2024-06-25 LAB
25(OH)D3 SERPL-MCNC: 47.2 NG/ML (ref 30–100)
ALBUMIN SERPL BCG-MCNC: 4.6 G/DL (ref 3.5–5)
ALP SERPL-CCNC: 59 U/L (ref 34–104)
ALT SERPL W P-5'-P-CCNC: 44 U/L (ref 7–52)
ANION GAP SERPL CALCULATED.3IONS-SCNC: 10 MMOL/L (ref 4–13)
AST SERPL W P-5'-P-CCNC: 41 U/L (ref 13–39)
BASOPHILS # BLD AUTO: 0.06 THOUSANDS/ÂΜL (ref 0–0.1)
BASOPHILS NFR BLD AUTO: 1 % (ref 0–1)
BILIRUB SERPL-MCNC: 0.42 MG/DL (ref 0.2–1)
BUN SERPL-MCNC: 8 MG/DL (ref 5–25)
CALCIUM SERPL-MCNC: 9.9 MG/DL (ref 8.4–10.2)
CHLORIDE SERPL-SCNC: 101 MMOL/L (ref 96–108)
CO2 SERPL-SCNC: 34 MMOL/L (ref 21–32)
CREAT SERPL-MCNC: 0.7 MG/DL (ref 0.6–1.3)
EOSINOPHIL # BLD AUTO: 0.33 THOUSAND/ÂΜL (ref 0–0.61)
EOSINOPHIL NFR BLD AUTO: 4 % (ref 0–6)
ERYTHROCYTE [DISTWIDTH] IN BLOOD BY AUTOMATED COUNT: 14.9 % (ref 11.6–15.1)
GFR SERPL CREATININE-BSD FRML MDRD: 93 ML/MIN/1.73SQ M
GLUCOSE SERPL-MCNC: 100 MG/DL (ref 65–140)
HCT VFR BLD AUTO: 44 % (ref 34.8–46.1)
HGB BLD-MCNC: 14.7 G/DL (ref 11.5–15.4)
IMM GRANULOCYTES # BLD AUTO: 0.03 THOUSAND/UL (ref 0–0.2)
IMM GRANULOCYTES NFR BLD AUTO: 0 % (ref 0–2)
LYMPHOCYTES # BLD AUTO: 3.16 THOUSANDS/ÂΜL (ref 0.6–4.47)
LYMPHOCYTES NFR BLD AUTO: 41 % (ref 14–44)
MCH RBC QN AUTO: 32 PG (ref 26.8–34.3)
MCHC RBC AUTO-ENTMCNC: 33.4 G/DL (ref 31.4–37.4)
MCV RBC AUTO: 96 FL (ref 82–98)
MONOCYTES # BLD AUTO: 0.57 THOUSAND/ÂΜL (ref 0.17–1.22)
MONOCYTES NFR BLD AUTO: 7 % (ref 4–12)
NEUTROPHILS # BLD AUTO: 3.59 THOUSANDS/ÂΜL (ref 1.85–7.62)
NEUTS SEG NFR BLD AUTO: 47 % (ref 43–75)
NRBC BLD AUTO-RTO: 0 /100 WBCS
PLATELET # BLD AUTO: 204 THOUSANDS/UL (ref 149–390)
PMV BLD AUTO: 12 FL (ref 8.9–12.7)
POTASSIUM SERPL-SCNC: 4.3 MMOL/L (ref 3.5–5.3)
PROT SERPL-MCNC: 7.7 G/DL (ref 6.4–8.4)
RBC # BLD AUTO: 4.6 MILLION/UL (ref 3.81–5.12)
SODIUM SERPL-SCNC: 145 MMOL/L (ref 135–147)
TSH SERPL DL<=0.05 MIU/L-ACNC: 1.6 UIU/ML (ref 0.45–4.5)
VALPROATE SERPL-MCNC: 82 UG/ML (ref 50–100)
VIT B12 SERPL-MCNC: 442 PG/ML (ref 180–914)
WBC # BLD AUTO: 7.74 THOUSAND/UL (ref 4.31–10.16)

## 2024-06-25 PROCEDURE — 82306 VITAMIN D 25 HYDROXY: CPT | Performed by: FAMILY MEDICINE

## 2024-06-25 PROCEDURE — 80053 COMPREHEN METABOLIC PANEL: CPT | Performed by: FAMILY MEDICINE

## 2024-06-25 PROCEDURE — 82607 VITAMIN B-12: CPT | Performed by: FAMILY MEDICINE

## 2024-06-25 PROCEDURE — 80164 ASSAY DIPROPYLACETIC ACD TOT: CPT | Performed by: FAMILY MEDICINE

## 2024-06-25 PROCEDURE — 84443 ASSAY THYROID STIM HORMONE: CPT | Performed by: FAMILY MEDICINE

## 2024-06-25 PROCEDURE — 36415 COLL VENOUS BLD VENIPUNCTURE: CPT | Performed by: FAMILY MEDICINE

## 2024-06-25 PROCEDURE — 85025 COMPLETE CBC W/AUTO DIFF WBC: CPT | Performed by: FAMILY MEDICINE

## 2024-06-25 PROCEDURE — 99214 OFFICE O/P EST MOD 30 MIN: CPT | Performed by: FAMILY MEDICINE

## 2024-06-25 RX ORDER — SEMAGLUTIDE 1.7 MG/.75ML
INJECTION, SOLUTION SUBCUTANEOUS
Qty: 9 ML | Refills: 1 | Status: SHIPPED | OUTPATIENT
Start: 2024-06-25 | End: 2024-06-26 | Stop reason: SDUPTHER

## 2024-06-25 RX ORDER — DEXLANSOPRAZOLE 60 MG/1
60 CAPSULE, DELAYED RELEASE ORAL DAILY
Qty: 90 CAPSULE | Refills: 1 | Status: SHIPPED | OUTPATIENT
Start: 2024-06-25

## 2024-06-25 NOTE — PROGRESS NOTES
Ambulatory Visit  Name: Tati Higuera      : 1960      MRN: [id number]  Encounter Provider: Anabella Wade DO  Encounter Date: 2024   Encounter department: [facility] PRIMARY CARE    Assessment & Plan   1. Gastroesophageal reflux disease without esophagitis  Assessment & Plan:  Worsening symptoms.  Quite likely related at least in part to her GLP-1 therapy.  I will try switching her pantoprazole to Dexilant, may need a prior approval via her insurance company.  With associated dysphagia, again questionable relationship to Wegovy?  If symptoms continue, or Dexilant not controlling symptoms may consider GI referral.  Orders:  -     dexlansoprazole (DEXILANT) 60 MG capsule; Take 1 capsule (60 mg total) by mouth daily  2. B12 deficiency due to diet  -     Vitamin B12  3. Vitamin D deficiency  -     Vitamin D 25 hydroxy  4. Other fatigue  Assessment & Plan:  Unclear source.  Perhaps multifactorial.  Will draw some labs today to rule out anything metabolic.  Orders:  -     CBC and differential  -     Comprehensive metabolic panel  -     TSH, 3rd generation  5. Moderate bipolar II disorder, major depressive episode, in full remission, with mixed features (HCC)  Assessment & Plan:  Patient feels quite well on her current dose of Depakote and Pristiq.  I will draw valproic acid level today just to ensure normal value.  Orders:  -     Valproic acid level, total  6. Class 2 obesity due to excess calories without serious comorbidity with body mass index (BMI) of 35.0 to 35.9 in adult  -     Semaglutide-Weight Management (Wegovy) 1.7 MG/0.75ML; Inject 1.7 mg under the skin weekly  7. Primary hypertension  Assessment & Plan:  Stable on current meds.  8. Abnormal glucose  Assessment & Plan:  Most recent A1c improving.  Clearly Wegovy helping with this.  Weight loss noted and patient congratulated.     Recheck all 3 months.  History of Present Illness     Obesity- now on 1mg wegovy.   "Feeling well on this dosage.  No significant side effects such as vomiting or change in bowel habits.  Does note some dysphagia with larger pills that time, this is new as she has been on the same dosage of Depakote for quite a while now.  Also notes an increase in her GERD symptoms.  Mild intermittent nausea which is transient.  Has lost about 20 pounds so far and very motivated to lose more weight.  Watches her diet quite closely unless she is on vacation.  Willing to increase the dose at the next appropriate interval.  Had recent A1c value.    Fatigue-- new sx. Sx's about one month.  Cannot identify anything that is changed in the past month which would have prompted this.  Her psychiatric meds are the same dosages as prior, she feels quite well on these meds and denies depressive symptoms or significant anxiety.  She sleeps well at night, 7-1/2 to 8 hours.    Hyperlipidemia--had recent fasting lipid panel she wanted to review today.        Review of Systems   Constitutional:  Negative for activity change, appetite change, chills, fatigue, fever and unexpected weight change.   Respiratory:  Negative for cough and shortness of breath.    Cardiovascular:  Negative for chest pain.   Gastrointestinal:  Positive for nausea. Negative for abdominal distention, abdominal pain, blood in stool, constipation, diarrhea and vomiting.   Neurological:  Negative for dizziness.       Objective     /86 (BP Location: Left arm, Patient Position: Sitting, Cuff Size: Large)   Pulse 78   Temp (!) 95.6 °F (35.3 °C) (Tympanic)   Ht 5' 1\" (1.549 m)   Wt 80.4 kg (177 lb 3.2 oz)   SpO2 97%   BMI 33.48 kg/m²     Physical Exam  Vitals and nursing note reviewed.   Constitutional:       General: She is not in acute distress.     Appearance: Normal appearance. She is normal weight. She is not ill-appearing or toxic-appearing.   HENT:      Head: Normocephalic.   Musculoskeletal:         General: Normal range of motion.   Skin:     " General: Skin is warm.   Neurological:      General: No focal deficit present.      Mental Status: She is alert and oriented to person, place, and time. Mental status is at baseline.      Cranial Nerves: No cranial nerve deficit.   Psychiatric:         Attention and Perception: Attention and perception normal.         Mood and Affect: Mood normal.         Speech: Speech normal.         Behavior: Behavior normal. Behavior is cooperative.         Thought Content: Thought content normal.         Cognition and Memory: Cognition and memory normal.         Judgment: Judgment normal.       Administrative Statements

## 2024-06-25 NOTE — ASSESSMENT & PLAN NOTE
Most recent A1c improving.  Clearly Wegovy helping with this.  Weight loss noted and patient congratulated.

## 2024-06-25 NOTE — ASSESSMENT & PLAN NOTE
Worsening symptoms.  Quite likely related at least in part to her GLP-1 therapy.  I will try switching her pantoprazole to Dexilant, may need a prior approval via her insurance company.  With associated dysphagia, again questionable relationship to Wegovy?  If symptoms continue, or Dexilant not controlling symptoms may consider GI referral.

## 2024-06-25 NOTE — ASSESSMENT & PLAN NOTE
Patient feels quite well on her current dose of Depakote and Pristiq.  I will draw valproic acid level today just to ensure normal value.

## 2024-06-25 NOTE — ASSESSMENT & PLAN NOTE
Unclear source.  Perhaps multifactorial.  Will draw some labs today to rule out anything metabolic.

## 2024-06-26 DIAGNOSIS — E66.09 CLASS 2 OBESITY DUE TO EXCESS CALORIES WITHOUT SERIOUS COMORBIDITY WITH BODY MASS INDEX (BMI) OF 35.0 TO 35.9 IN ADULT: ICD-10-CM

## 2024-06-26 RX ORDER — SEMAGLUTIDE 1.7 MG/.75ML
INJECTION, SOLUTION SUBCUTANEOUS
Qty: 9 ML | Refills: 1 | Status: SHIPPED | OUTPATIENT
Start: 2024-06-26

## 2024-07-01 ENCOUNTER — TELEPHONE (OUTPATIENT)
Age: 64
End: 2024-07-01

## 2024-07-01 NOTE — TELEPHONE ENCOUNTER
PA for Wegovy 1.7 MG/0.75ML     Submitted via    []CMM-KEY   [x][employer]-Case ID # [id number]   []Faxed to plan   []Other website   []Phone call Case ID #     Office notes sent, clinical questions answered. Awaiting determination    Turnaround time for your insurance to make a decision on your Prior Authorization can take 7-21 business days.

## 2024-07-08 ENCOUNTER — TELEPHONE (OUTPATIENT)
Dept: FAMILY MEDICINE CLINIC | Facility: CLINIC | Age: 64
End: 2024-07-08

## 2024-07-12 NOTE — TELEPHONE ENCOUNTER
Patient calling to check on status of this PA for dexlansoprazole (DEXILANT).  She states the script was sent on 6/25/24 and she has been waiting for the authorization to be completed since then.  She states she has a constant feelings of acid reflux and requires this medication as soon as possible.  This encounter shows she called on 7/8/24 but there has not been an update since.  I advised her to call her insurance to check on the status of the PA and that the PA team would contact her once an approval or denial was received.  She voiced understanding.

## 2024-07-15 ENCOUNTER — TELEPHONE (OUTPATIENT)
Age: 64
End: 2024-07-15

## 2024-07-15 NOTE — TELEPHONE ENCOUNTER
PA for dexlansoprazole (DEXILANT) 60 MG capsule     Submitted via    [][employer]-KEY   [x][employer]-Case ID # [id number]   []Faxed to plan   []Other website   []Phone call Case ID #     Office notes sent, clinical questions answered. Awaiting determination    Turnaround time for your insurance to make a decision on your Prior Authorization can take 7-21 business days.

## 2024-07-15 NOTE — TELEPHONE ENCOUNTER
Patient called regarding her prior auth for dexilant.  She says that she contacted her insurance company and they said they have not received a prior auth request.  She is upset as she has been waiting for 4 weeks for this medication.  Please contact patient and advise.  Thank you.

## 2024-07-19 ENCOUNTER — OFFICE VISIT (OUTPATIENT)
Dept: FAMILY MEDICINE CLINIC | Facility: CLINIC | Age: 64
End: 2024-07-19
Payer: COMMERCIAL

## 2024-07-19 VITALS
WEIGHT: 173 LBS | DIASTOLIC BLOOD PRESSURE: 78 MMHG | BODY MASS INDEX: 32.66 KG/M2 | HEIGHT: 61 IN | SYSTOLIC BLOOD PRESSURE: 122 MMHG | TEMPERATURE: 95.1 F

## 2024-07-19 DIAGNOSIS — K21.9 GASTROESOPHAGEAL REFLUX DISEASE WITHOUT ESOPHAGITIS: ICD-10-CM

## 2024-07-19 DIAGNOSIS — M54.50 ACUTE RIGHT-SIDED LOW BACK PAIN WITHOUT SCIATICA: ICD-10-CM

## 2024-07-19 DIAGNOSIS — M25.551 PAIN OF RIGHT HIP: Primary | ICD-10-CM

## 2024-07-19 PROCEDURE — 99213 OFFICE O/P EST LOW 20 MIN: CPT | Performed by: NURSE PRACTITIONER

## 2024-07-19 PROCEDURE — 96372 THER/PROPH/DIAG INJ SC/IM: CPT | Performed by: NURSE PRACTITIONER

## 2024-07-19 RX ORDER — CELECOXIB 200 MG/1
200 CAPSULE ORAL 2 TIMES DAILY
Qty: 30 CAPSULE | Refills: 1 | Status: SHIPPED | OUTPATIENT
Start: 2024-07-19

## 2024-07-19 RX ORDER — DEXAMETHASONE SODIUM PHOSPHATE 10 MG/ML
10 INJECTION INTRAMUSCULAR; INTRAVENOUS ONCE
Status: COMPLETED | OUTPATIENT
Start: 2024-07-19 | End: 2024-07-19

## 2024-07-19 RX ORDER — LANSOPRAZOLE 30 MG/1
30 CAPSULE, DELAYED RELEASE ORAL DAILY
Qty: 30 CAPSULE | Refills: 1 | Status: SHIPPED | OUTPATIENT
Start: 2024-07-19

## 2024-07-19 RX ORDER — KETOROLAC TROMETHAMINE 30 MG/ML
60 INJECTION, SOLUTION INTRAMUSCULAR; INTRAVENOUS ONCE
Status: COMPLETED | OUTPATIENT
Start: 2024-07-19 | End: 2024-07-19

## 2024-07-19 RX ADMIN — DEXAMETHASONE SODIUM PHOSPHATE 10 MG: 10 INJECTION INTRAMUSCULAR; INTRAVENOUS at 15:03

## 2024-07-19 RX ADMIN — KETOROLAC TROMETHAMINE 60 MG: 30 INJECTION, SOLUTION INTRAMUSCULAR; INTRAVENOUS at 15:04

## 2024-07-19 NOTE — PROGRESS NOTES
"Ambulatory Visit  Name: Tati Higuera      : 1960      MRN: [id number]  Encounter Provider: HEIDE Smith  Encounter Date: 2024   Encounter department: [facility] PRIMARY CARE    Assessment & Plan   1. Pain of right hip  Comments:  bursitis-(less likely radiculopathy).celebrex(watch GI), inb will check Xr and consider point inj  Orders:  -     celecoxib (CeleBREX) 200 mg capsule; Take 1 capsule (200 mg total) by mouth 2 (two) times a day  -     dexamethasone (DECADRON) injection 10 mg  -     ketorolac (TORADOL) 60 mg/2 mL IM injection 60 mg  2. Acute right-sided low back pain without sciatica  Comments:  vs other , include xr LS spine inb  Orders:  -     celecoxib (CeleBREX) 200 mg capsule; Take 1 capsule (200 mg total) by mouth 2 (two) times a day  -     dexamethasone (DECADRON) injection 10 mg  -     ketorolac (TORADOL) 60 mg/2 mL IM injection 60 mg  3. Gastroesophageal reflux disease without esophagitis  Comments:  Failed on Omep in past, Pantop has not been helping, will try Prevacid  Orders:  -     lansoprazole (PREVACID) 30 mg capsule; Take 1 capsule (30 mg total) by mouth daily  -     celecoxib (CeleBREX) 200 mg capsule; Take 1 capsule (200 mg total) by mouth 2 (two) times a day     History of Present Illness     Pain in R hip for over a week. No inj, chronic neck pain. An old LS XR from  showed mild DDD @ that time.   Pt awoke w/ pain,no inj. RL back @ SI at times, more often R post/lat hip  No paresthesias or numbness or weakness      Hip Pain         Review of Systems    Objective     /78 (BP Location: Left arm, Patient Position: Sitting, Cuff Size: Large)   Temp (!) 95.1 °F (35.1 °C) (Tympanic)   Ht 5' 1\" (1.549 m)   Wt 78.5 kg (173 lb)   BMI 32.69 kg/m²     Physical Exam  Musculoskeletal:      Comments: Mildly Antalgic gait  Tender Post Lat aspect GT. Slight pain w/ Int Rot.mild tight good ROM L hip. Neg SLR chaim   Neurological:      Mental Status: She is " alert.       Administrative Statements

## 2024-07-24 ENCOUNTER — TELEPHONE (OUTPATIENT)
Dept: FAMILY MEDICINE CLINIC | Facility: CLINIC | Age: 64
End: 2024-07-24

## 2024-07-24 DIAGNOSIS — M54.50 ACUTE RIGHT-SIDED LOW BACK PAIN WITHOUT SCIATICA: ICD-10-CM

## 2024-07-24 DIAGNOSIS — M25.551 PAIN OF RIGHT HIP: Primary | ICD-10-CM

## 2024-07-24 NOTE — TELEPHONE ENCOUNTER
my hip is no better. Toradol worked for short time and celebrex isn't helping. I am doing the stretches but still in pain.

## 2024-07-24 NOTE — TELEPHONE ENCOUNTER
I spoke to patient.  We will get x-ray of her back and hip and then attempt a bursal injection if x-ray is normal

## 2024-07-25 ENCOUNTER — APPOINTMENT (OUTPATIENT)
Dept: RADIOLOGY | Facility: CLINIC | Age: 64
End: 2024-07-25
Payer: COMMERCIAL

## 2024-07-25 DIAGNOSIS — M54.50 ACUTE RIGHT-SIDED LOW BACK PAIN WITHOUT SCIATICA: ICD-10-CM

## 2024-07-25 DIAGNOSIS — M25.551 PAIN OF RIGHT HIP: ICD-10-CM

## 2024-07-25 PROCEDURE — 72110 X-RAY EXAM L-2 SPINE 4/>VWS: CPT

## 2024-07-25 PROCEDURE — 73502 X-RAY EXAM HIP UNI 2-3 VIEWS: CPT

## 2024-07-26 ENCOUNTER — OFFICE VISIT (OUTPATIENT)
Dept: FAMILY MEDICINE CLINIC | Facility: CLINIC | Age: 64
End: 2024-07-26
Payer: COMMERCIAL

## 2024-07-26 VITALS
WEIGHT: 171 LBS | TEMPERATURE: 98.6 F | BODY MASS INDEX: 32.28 KG/M2 | DIASTOLIC BLOOD PRESSURE: 70 MMHG | SYSTOLIC BLOOD PRESSURE: 130 MMHG | HEIGHT: 61 IN | HEART RATE: 75 BPM | OXYGEN SATURATION: 98 %

## 2024-07-26 DIAGNOSIS — M25.551 PAIN OF RIGHT HIP: Primary | ICD-10-CM

## 2024-07-26 DIAGNOSIS — M70.61 TROCHANTERIC BURSITIS OF RIGHT HIP: ICD-10-CM

## 2024-07-26 PROCEDURE — 20610 DRAIN/INJ JOINT/BURSA W/O US: CPT | Performed by: NURSE PRACTITIONER

## 2024-07-26 RX ADMIN — LIDOCAINE HYDROCHLORIDE 10 ML: 10 INJECTION, SOLUTION EPIDURAL; INFILTRATION; INTRACAUDAL; PERINEURAL at 09:40

## 2024-07-26 RX ADMIN — BUPIVACAINE HYDROCHLORIDE 10 ML: 5 INJECTION, SOLUTION EPIDURAL; INTRACAUDAL at 09:40

## 2024-07-26 RX ADMIN — METHYLPREDNISOLONE ACETATE 0.5 ML: 80 INJECTION, SUSPENSION INTRA-ARTICULAR; INTRALESIONAL; INTRAMUSCULAR; SOFT TISSUE at 09:40

## 2024-07-26 NOTE — PROGRESS NOTES
"Large joint arthrocentesis: L greater trochanteric bursa  Universal Protocol:  Consent: Verbal consent obtained. Written consent not obtained.  Risks and benefits: risks, benefits and alternatives were discussed  Consent given by: patient  Time out: Immediately prior to procedure a \"time out\" was called to verify the correct patient, procedure, equipment, support staff and site/side marked as required.  Patient understanding: patient states understanding of the procedure being performed  Patient consent: the patient's understanding of the procedure matches consent given  Procedure consent: procedure consent matches procedure scheduled  Site marked: the operative site was marked  Required items: required blood products, implants, devices, and special equipment available  Patient identity confirmed: verbally with patient  Supporting Documentation  Indications: pain   Procedure Details  Location: hip - L greater trochanteric bursa  Needle size: 20 G  Ultrasound guidance: no  Approach: lateral  Medications administered: 10 mL bupivacaine (PF) 0.5 %; 10 mL lidocaine (PF) 1 %; 0.5 mL methylPREDNISolone acetate 80 mg/mL    Aspirate amount: 0 mL  Patient tolerance: patient tolerated the procedure well with no immediate complications        Ambulatory Visit  Name: Tati Higuera      : 1960      MRN: [id number]  Encounter Provider: HEIDE Smith  Encounter Date: 2024   Encounter department: [facility] PRIMARY CARE    Assessment & Plan   1. Pain of right hip  -     Large joint arthrocentesis: L greater trochanteric bursa  -     lidocaine (PF) (XYLOCAINE-MPF) 1 % injection 10 mL  -     bupivacaine (PF) (MARCAINE) 0.5 % injection 10 mL  -     methylPREDNISolone acetate (DEPO-MEDROL) injection 0.5 mL  2. Trochanteric bursitis of right hip  Comments:  Follow-up if unresolved  Orders:  -     Large joint arthrocentesis: L greater trochanteric bursa  -     lidocaine (PF) (XYLOCAINE-MPF) 1 % " "injection 10 mL  -     bupivacaine (PF) (MARCAINE) 0.5 % injection 10 mL  -     methylPREDNISolone acetate (DEPO-MEDROL) injection 0.5 mL       History of Present Illness     Here for follow-up on that hip pain it really did not improve a lot with medications.  After reviewing x-rays we decided she might benefit from an injection of her bursa    Hip Pain         Review of Systems    Objective     /70 (BP Location: Left arm, Patient Position: Sitting, Cuff Size: Large)   Pulse 75   Temp 98.6 °F (37 °C) (Tympanic)   Ht 5' 1\" (1.549 m)   Wt 77.6 kg (171 lb)   SpO2 98%   BMI 32.31 kg/m²     Physical Exam  Musculoskeletal:      Comments: Right hip pain with motion and white tender over GT bursa       Administrative Statements           "

## 2024-07-29 RX ORDER — BUPIVACAINE HYDROCHLORIDE 5 MG/ML
10 INJECTION, SOLUTION EPIDURAL; INTRACAUDAL
Status: COMPLETED | OUTPATIENT
Start: 2024-07-26 | End: 2024-07-26

## 2024-07-29 RX ORDER — METHYLPREDNISOLONE ACETATE 80 MG/ML
0.5 INJECTION, SUSPENSION INTRA-ARTICULAR; INTRALESIONAL; INTRAMUSCULAR; SOFT TISSUE
Status: COMPLETED | OUTPATIENT
Start: 2024-07-26 | End: 2024-07-26

## 2024-07-29 RX ORDER — LIDOCAINE HYDROCHLORIDE 10 MG/ML
10 INJECTION, SOLUTION EPIDURAL; INFILTRATION; INTRACAUDAL; PERINEURAL
Status: COMPLETED | OUTPATIENT
Start: 2024-07-26 | End: 2024-07-26

## 2024-08-02 ENCOUNTER — TELEPHONE (OUTPATIENT)
Dept: FAMILY MEDICINE CLINIC | Facility: CLINIC | Age: 64
End: 2024-08-02

## 2024-08-06 ENCOUNTER — TELEPHONE (OUTPATIENT)
Dept: FAMILY MEDICINE CLINIC | Facility: CLINIC | Age: 64
End: 2024-08-06

## 2024-08-06 DIAGNOSIS — B37.0 THRUSH: Primary | ICD-10-CM

## 2024-08-06 RX ORDER — FLUCONAZOLE 100 MG/1
100 TABLET ORAL DAILY
Qty: 7 TABLET | Refills: 0 | Status: SHIPPED | OUTPATIENT
Start: 2024-08-06 | End: 2024-08-13

## 2024-08-14 DIAGNOSIS — R09.81 NASAL CONGESTION: Primary | ICD-10-CM

## 2024-08-14 RX ORDER — FLUTICASONE PROPIONATE 50 MCG
1 SPRAY, SUSPENSION (ML) NASAL DAILY
Qty: 15.8 ML | Refills: 11 | Status: SHIPPED | OUTPATIENT
Start: 2024-08-14

## 2024-08-15 DIAGNOSIS — R11.0 NAUSEA: ICD-10-CM

## 2024-08-16 ENCOUNTER — CLINICAL SUPPORT (OUTPATIENT)
Dept: FAMILY MEDICINE CLINIC | Facility: CLINIC | Age: 64
End: 2024-08-16
Payer: COMMERCIAL

## 2024-08-16 DIAGNOSIS — Z23 ENCOUNTER FOR IMMUNIZATION: Primary | ICD-10-CM

## 2024-08-16 PROCEDURE — 90750 HZV VACC RECOMBINANT IM: CPT | Performed by: NURSE PRACTITIONER

## 2024-08-16 PROCEDURE — 90471 IMMUNIZATION ADMIN: CPT | Performed by: NURSE PRACTITIONER

## 2024-08-16 RX ORDER — ONDANSETRON 4 MG/1
TABLET, ORALLY DISINTEGRATING ORAL
Qty: 20 TABLET | Refills: 0 | Status: SHIPPED | OUTPATIENT
Start: 2024-08-16

## 2024-08-26 DIAGNOSIS — E66.09 CLASS 2 OBESITY DUE TO EXCESS CALORIES WITHOUT SERIOUS COMORBIDITY WITH BODY MASS INDEX (BMI) OF 35.0 TO 35.9 IN ADULT: Primary | ICD-10-CM

## 2024-08-26 RX ORDER — SEMAGLUTIDE 2.4 MG/.75ML
INJECTION, SOLUTION SUBCUTANEOUS
Qty: 9 ML | Refills: 1 | Status: SHIPPED | OUTPATIENT
Start: 2024-08-26 | End: 2025-02-26

## 2024-09-19 ENCOUNTER — RA CDI HCC (OUTPATIENT)
Dept: OTHER | Facility: HOSPITAL | Age: 64
End: 2024-09-19

## 2024-09-26 ENCOUNTER — OFFICE VISIT (OUTPATIENT)
Dept: FAMILY MEDICINE CLINIC | Facility: CLINIC | Age: 64
End: 2024-09-26
Payer: COMMERCIAL

## 2024-09-26 VITALS
WEIGHT: 160.6 LBS | HEIGHT: 61 IN | OXYGEN SATURATION: 97 % | DIASTOLIC BLOOD PRESSURE: 80 MMHG | HEART RATE: 80 BPM | BODY MASS INDEX: 30.32 KG/M2 | SYSTOLIC BLOOD PRESSURE: 126 MMHG

## 2024-09-26 DIAGNOSIS — E66.09 CLASS 2 OBESITY DUE TO EXCESS CALORIES WITHOUT SERIOUS COMORBIDITY WITH BODY MASS INDEX (BMI) OF 35.0 TO 35.9 IN ADULT: ICD-10-CM

## 2024-09-26 DIAGNOSIS — E66.812 CLASS 2 OBESITY DUE TO EXCESS CALORIES WITHOUT SERIOUS COMORBIDITY WITH BODY MASS INDEX (BMI) OF 35.0 TO 35.9 IN ADULT: ICD-10-CM

## 2024-09-26 DIAGNOSIS — I10 PRIMARY HYPERTENSION: ICD-10-CM

## 2024-09-26 DIAGNOSIS — R73.09 ABNORMAL GLUCOSE: Primary | ICD-10-CM

## 2024-09-26 DIAGNOSIS — F31.81 MODERATE BIPOLAR II DISORDER, MAJOR DEPRESSIVE EPISODE, IN FULL REMISSION, WITH MIXED FEATURES (HCC): ICD-10-CM

## 2024-09-26 PROBLEM — R09.81 NASAL CONGESTION: Status: RESOLVED | Noted: 2024-03-11 | Resolved: 2024-09-26

## 2024-09-26 PROBLEM — R60.0 LOCALIZED EDEMA: Status: RESOLVED | Noted: 2023-12-15 | Resolved: 2024-09-26

## 2024-09-26 PROBLEM — U07.1 COVID: Status: RESOLVED | Noted: 2023-12-28 | Resolved: 2024-09-26

## 2024-09-26 PROCEDURE — 99213 OFFICE O/P EST LOW 20 MIN: CPT | Performed by: FAMILY MEDICINE

## 2024-09-26 NOTE — ASSESSMENT & PLAN NOTE
Stable. Very intermittent episodes of dizziness upon standing. Advised BP check while symptomatic if able. Eventually may need dosage adjustment with continued weight loss.

## 2024-09-26 NOTE — PROGRESS NOTES
Ambulatory Visit  Name: Tati Higuera      : 1960      MRN: [id number]  Encounter Provider: Anabella Wade DO  Encounter Date: 2024   Encounter department: [facility] PRIMARY [facility]ARE    Assessment & Plan  Abnormal glucose    Orders:    Hemoglobin A1C; Future    Primary hypertension  Stable. Very intermittent episodes of dizziness upon standing. Advised BP check while symptomatic if able. Eventually may need dosage adjustment with continued weight loss.         Moderate bipolar II disorder, major depressive episode, in full remission, with mixed features (HCC)  Stable on current meds.  Does not want to change dosing or frequency.  Advised Pristiq may be causing her dry mouth symptoms.         Class 2 obesity due to excess calories without serious comorbidity with body mass index (BMI) of 35.0 to 35.9 in adult  At highest dose Wegovy.  Encouraged increasing her exercise and a more strict diet.            History of Present Illness     Obesity-    patient here for routine follow-up obesity/weight management.  Compliant with Wegovy, has been on 2.4 mg weekly for about the past month.  Dose has been titrated up since initiation of GLP-1 agonist drug class approximately 6 to 8 months ago.  She has lost a total of 35+ pounds.  She has modified her diet, increased exercise overall.  In the past month however she has noted a plateau of her weight.  She has only been on the 2.4 milligram dosing for the past 4 weeks.  Admittedly, she has lapsed on her diet in the past month or so.  Also with some residual hip pain that is prevented her from exercising as much as she would like.    Dry mouth-   complains of ongoing dry mouth, wondering if related to blood pressure pill.     A1C-   pre diabetic range but has improved, due for repeat level.           Review of Systems   Constitutional:  Positive for activity change. Negative for appetite change and fever.   Respiratory:  Negative for cough  "and shortness of breath.    Musculoskeletal:  Positive for arthralgias.           Objective     /80 (BP Location: Left arm, Patient Position: Sitting, Cuff Size: Adult)   Pulse 80   Ht 5' 1\" (1.549 m)   Wt 72.8 kg (160 lb 9.6 oz)   SpO2 97%   BMI 30.35 kg/m²     Physical Exam  Vitals and nursing note reviewed.   Constitutional:       General: She is not in acute distress.     Appearance: Normal appearance. She is not ill-appearing.   HENT:      Head: Normocephalic.   Cardiovascular:      Rate and Rhythm: Normal rate and regular rhythm.      Heart sounds: Normal heart sounds. No murmur heard.  Pulmonary:      Effort: Pulmonary effort is normal. No respiratory distress.      Breath sounds: Normal breath sounds. No stridor. No wheezing.   Musculoskeletal:      Cervical back: Normal range of motion and neck supple. No rigidity or tenderness.   Lymphadenopathy:      Cervical: No cervical adenopathy.   Skin:     Capillary Refill: Capillary refill takes less than 2 seconds.   Neurological:      General: No focal deficit present.      Mental Status: She is alert and oriented to person, place, and time. Mental status is at baseline.      Cranial Nerves: No cranial nerve deficit.   Psychiatric:         Mood and Affect: Mood normal.         Thought Content: Thought content normal.         Judgment: Judgment normal.         "

## 2024-09-26 NOTE — ASSESSMENT & PLAN NOTE
Stable on current meds.  Does not want to change dosing or frequency.  Advised Pristiq may be causing her dry mouth symptoms.

## 2024-10-09 ENCOUNTER — TELEPHONE (OUTPATIENT)
Dept: FAMILY MEDICINE CLINIC | Facility: CLINIC | Age: 64
End: 2024-10-09

## 2024-10-09 ENCOUNTER — CLINICAL SUPPORT (OUTPATIENT)
Dept: FAMILY MEDICINE CLINIC | Facility: CLINIC | Age: 64
End: 2024-10-09

## 2024-10-09 DIAGNOSIS — R30.0 DYSURIA: Primary | ICD-10-CM

## 2024-10-09 DIAGNOSIS — R30.0 DYSURIA: ICD-10-CM

## 2024-10-09 DIAGNOSIS — R30.0 BURNING WITH URINATION: Primary | ICD-10-CM

## 2024-10-09 PROCEDURE — 87086 URINE CULTURE/COLONY COUNT: CPT | Performed by: NURSE PRACTITIONER

## 2024-10-09 RX ORDER — CEPHALEXIN 500 MG/1
500 CAPSULE ORAL 3 TIMES DAILY
Qty: 21 CAPSULE | Refills: 0 | Status: SHIPPED | OUTPATIENT
Start: 2024-10-09 | End: 2024-10-16

## 2024-10-10 ENCOUNTER — TELEPHONE (OUTPATIENT)
Dept: FAMILY MEDICINE CLINIC | Facility: CLINIC | Age: 64
End: 2024-10-10

## 2024-10-10 ENCOUNTER — CLINICAL SUPPORT (OUTPATIENT)
Dept: FAMILY MEDICINE CLINIC | Facility: CLINIC | Age: 64
End: 2024-10-10
Payer: COMMERCIAL

## 2024-10-10 DIAGNOSIS — Z23 ENCOUNTER FOR IMMUNIZATION: Primary | ICD-10-CM

## 2024-10-10 DIAGNOSIS — S61.419A LACERATION OF DORSUM OF HAND: Primary | ICD-10-CM

## 2024-10-10 DIAGNOSIS — S61.419A LACERATION OF DORSUM OF HAND: ICD-10-CM

## 2024-10-10 PROCEDURE — 90715 TDAP VACCINE 7 YRS/> IM: CPT | Performed by: FAMILY MEDICINE

## 2024-10-10 PROCEDURE — 90471 IMMUNIZATION ADMIN: CPT | Performed by: FAMILY MEDICINE

## 2024-10-10 NOTE — TELEPHONE ENCOUNTER
Patient called has multiple scratches from cat today trying to get her to the vet last TD 2013 asking if she can come in for one will need order placed

## 2024-10-11 LAB — BACTERIA UR CULT: NORMAL

## 2024-10-11 NOTE — RESULT ENCOUNTER NOTE
Notify patient that urine did not prove an infection but if she is improving on the antibiotic she can just finish

## 2024-10-17 DIAGNOSIS — R11.0 NAUSEA: ICD-10-CM

## 2024-10-17 RX ORDER — ONDANSETRON 4 MG/1
4 TABLET, ORALLY DISINTEGRATING ORAL EVERY 6 HOURS PRN
Qty: 20 TABLET | Refills: 3 | Status: SHIPPED | OUTPATIENT
Start: 2024-10-17

## 2024-10-24 ENCOUNTER — OFFICE VISIT (OUTPATIENT)
Dept: FAMILY MEDICINE CLINIC | Facility: CLINIC | Age: 64
End: 2024-10-24
Payer: COMMERCIAL

## 2024-10-24 VITALS
SYSTOLIC BLOOD PRESSURE: 116 MMHG | BODY MASS INDEX: 29.42 KG/M2 | WEIGHT: 155.8 LBS | OXYGEN SATURATION: 99 % | TEMPERATURE: 96.2 F | HEART RATE: 86 BPM | HEIGHT: 61 IN | DIASTOLIC BLOOD PRESSURE: 82 MMHG

## 2024-10-24 DIAGNOSIS — M70.61 TROCHANTERIC BURSITIS OF RIGHT HIP: ICD-10-CM

## 2024-10-24 DIAGNOSIS — J01.90 ACUTE NON-RECURRENT SINUSITIS, UNSPECIFIED LOCATION: ICD-10-CM

## 2024-10-24 DIAGNOSIS — J30.1 SEASONAL ALLERGIC RHINITIS DUE TO POLLEN: Primary | ICD-10-CM

## 2024-10-24 PROCEDURE — 99213 OFFICE O/P EST LOW 20 MIN: CPT | Performed by: FAMILY MEDICINE

## 2024-10-24 PROCEDURE — 20610 DRAIN/INJ JOINT/BURSA W/O US: CPT | Performed by: FAMILY MEDICINE

## 2024-10-24 RX ORDER — CEFUROXIME AXETIL 500 MG/1
500 TABLET ORAL EVERY 12 HOURS SCHEDULED
Qty: 20 TABLET | Refills: 0 | Status: SHIPPED | OUTPATIENT
Start: 2024-10-24 | End: 2024-11-03

## 2024-10-24 RX ORDER — METHYLPREDNISOLONE ACETATE 40 MG/ML
1 INJECTION, SUSPENSION INTRA-ARTICULAR; INTRALESIONAL; INTRAMUSCULAR; SOFT TISSUE
Status: COMPLETED | OUTPATIENT
Start: 2024-10-24 | End: 2024-10-24

## 2024-10-24 RX ORDER — LIDOCAINE HYDROCHLORIDE 20 MG/ML
8 INJECTION, SOLUTION EPIDURAL; INFILTRATION; INTRACAUDAL; PERINEURAL
Status: COMPLETED | OUTPATIENT
Start: 2024-10-24 | End: 2024-10-24

## 2024-10-24 RX ORDER — AZELASTINE 1 MG/ML
1 SPRAY, METERED NASAL 2 TIMES DAILY
Qty: 30 ML | Refills: 5 | Status: SHIPPED | OUTPATIENT
Start: 2024-10-24

## 2024-10-24 RX ADMIN — METHYLPREDNISOLONE ACETATE 1 ML: 40 INJECTION, SUSPENSION INTRA-ARTICULAR; INTRALESIONAL; INTRAMUSCULAR; SOFT TISSUE at 09:00

## 2024-10-24 RX ADMIN — LIDOCAINE HYDROCHLORIDE 8 ML: 20 INJECTION, SOLUTION EPIDURAL; INFILTRATION; INTRACAUDAL; PERINEURAL at 09:00

## 2024-10-24 NOTE — PROGRESS NOTES
"Large joint arthrocentesis: R greater trochanteric bursa  Universal Protocol:  procedure performed by consultantConsent: Verbal consent obtained. Written consent not obtained.  Risks and benefits: risks, benefits and alternatives were discussed  Consent given by: patient  Time out: Immediately prior to procedure a \"time out\" was called to verify the correct patient, procedure, equipment, support staff and site/side marked as required.  Timeout called at: 10/24/2024 9:21 AM.  Patient understanding: patient states understanding of the procedure being performed  Patient identity confirmed: verbally with patient  Supporting Documentation  Indications: pain   Procedure Details  Location: hip - R greater trochanteric bursa  Needle size: 20 G  Ultrasound guidance: no  Approach: lateral  Medications administered: 1 mL methylPREDNISolone acetate 40 mg/mL; 8 mL lidocaine (PF) 2 %    Patient tolerance: patient tolerated the procedure well with no immediate complications        Ambulatory Visit  Name: Tati Higuera      : 1960      MRN: [id number]  Encounter Provider: Anabella Wade DO  Encounter Date: 10/24/2024   Encounter department: [facility]    Assessment & Plan  Seasonal allergic rhinitis due to pollen  Continue Flonase and antihistamine.  I added Astelin nasal spray, alternate this nasal spray with Flonase every other day.  Consider adding Singulair.  Orders:    azelastine (ASTELIN) 0.1 % nasal spray; 1 spray into each nostril 2 (two) times a day Use in each nostril as directed    Acute non-recurrent sinusitis, unspecified location  If symptoms no better in the next 3 to 5 days, advised to start Ceftin.  Orders:    cefuroxime (CEFTIN) 500 mg tablet; Take 1 tablet (500 mg total) by mouth every 12 (twelve) hours for 10 days    Trochanteric bursitis of right hip            History of Present Illness     Right hip pain-   had similar symptoms previously, x-ray performed: " "Small osteophyte of the superior right acetabular margin.  She had a trochanteric bursal injection performed in July which alleviated symptoms for several months.    Sinus pain/allergies--currently utilizing Flonase in addition to an over-the-counter antihistamine which she rotates depending on which is on sale at a store.  Complains of sinus pressure, headache, postnasal drip.  No fevers, does have headaches daily.  Wondering what else she can add.  Allergies can eventually develop into a sinusitis if symptoms go on long enough.          Review of Systems   Constitutional:  Negative for activity change, appetite change, chills, fatigue and fever.   HENT:  Positive for congestion, postnasal drip, rhinorrhea, sinus pressure and sinus pain. Negative for ear pain and facial swelling.    Respiratory:  Negative for cough, shortness of breath and wheezing.    Cardiovascular:  Negative for chest pain.   Gastrointestinal:  Negative for abdominal pain.   Neurological:  Negative for dizziness and headaches.           Objective     /82 (BP Location: Left arm, Patient Position: Sitting, Cuff Size: Large)   Pulse 86   Temp (!) 96.2 °F (35.7 °C) (Tympanic)   Ht 5' 1\" (1.549 m)   Wt 70.7 kg (155 lb 12.8 oz)   SpO2 99%   BMI 29.44 kg/m²     Physical Exam  Vitals and nursing note reviewed.   Constitutional:       General: She is not in acute distress.     Appearance: Normal appearance. She is normal weight. She is not ill-appearing or toxic-appearing.   HENT:      Head: Normocephalic.      Right Ear: Tympanic membrane, ear canal and external ear normal.      Left Ear: Tympanic membrane, ear canal and external ear normal.      Nose: Nose normal.      Mouth/Throat:      Mouth: Mucous membranes are moist.      Pharynx: Oropharynx is clear.   Eyes:      Pupils: Pupils are equal, round, and reactive to light.   Cardiovascular:      Rate and Rhythm: Normal rate and regular rhythm.      Pulses: Normal pulses.      Heart sounds: " No murmur heard.  Pulmonary:      Effort: Pulmonary effort is normal. No respiratory distress.      Breath sounds: Normal breath sounds.   Musculoskeletal:      Cervical back: Normal range of motion and neck supple. No rigidity or tenderness.      Right hip: Tenderness and bony tenderness present.   Lymphadenopathy:      Cervical: No cervical adenopathy.   Skin:     General: Skin is warm.      Capillary Refill: Capillary refill takes less than 2 seconds.   Neurological:      General: No focal deficit present.      Mental Status: She is alert. Mental status is at baseline.   Psychiatric:         Mood and Affect: Mood normal.         Thought Content: Thought content normal.         Judgment: Judgment normal.

## 2024-10-24 NOTE — ASSESSMENT & PLAN NOTE
Continue Flonase and antihistamine.  I added Astelin nasal spray, alternate this nasal spray with Flonase every other day.  Consider adding Singulair.  Orders:    azelastine (ASTELIN) 0.1 % nasal spray; 1 spray into each nostril 2 (two) times a day Use in each nostril as directed

## 2024-10-30 ENCOUNTER — DOCUMENTATION (OUTPATIENT)
Dept: FAMILY MEDICINE CLINIC | Facility: CLINIC | Age: 64
End: 2024-10-30

## 2024-10-30 DIAGNOSIS — M25.512 ACUTE PAIN OF LEFT SHOULDER: Primary | ICD-10-CM

## 2024-10-31 ENCOUNTER — OFFICE VISIT (OUTPATIENT)
Dept: FAMILY MEDICINE CLINIC | Facility: CLINIC | Age: 64
End: 2024-10-31
Payer: COMMERCIAL

## 2024-10-31 VITALS
HEIGHT: 61 IN | BODY MASS INDEX: 28.32 KG/M2 | WEIGHT: 150 LBS | DIASTOLIC BLOOD PRESSURE: 86 MMHG | HEART RATE: 88 BPM | OXYGEN SATURATION: 98 % | SYSTOLIC BLOOD PRESSURE: 120 MMHG

## 2024-10-31 DIAGNOSIS — M53.3 COCCYDYNIA: Primary | ICD-10-CM

## 2024-10-31 DIAGNOSIS — M25.512 ACUTE PAIN OF LEFT SHOULDER: ICD-10-CM

## 2024-10-31 PROCEDURE — 96372 THER/PROPH/DIAG INJ SC/IM: CPT | Performed by: NURSE PRACTITIONER

## 2024-10-31 PROCEDURE — 99213 OFFICE O/P EST LOW 20 MIN: CPT | Performed by: NURSE PRACTITIONER

## 2024-10-31 RX ORDER — CELECOXIB 200 MG/1
200 CAPSULE ORAL 2 TIMES DAILY
Qty: 30 CAPSULE | Refills: 1 | Status: SHIPPED | OUTPATIENT
Start: 2024-10-31

## 2024-10-31 RX ORDER — KETOROLAC TROMETHAMINE 30 MG/ML
60 INJECTION, SOLUTION INTRAMUSCULAR; INTRAVENOUS ONCE
Status: COMPLETED | OUTPATIENT
Start: 2024-10-31 | End: 2024-10-31

## 2024-10-31 RX ORDER — DEXAMETHASONE SODIUM PHOSPHATE 10 MG/ML
10 INJECTION INTRAMUSCULAR; INTRAVENOUS ONCE
Status: COMPLETED | OUTPATIENT
Start: 2024-10-31 | End: 2024-10-31

## 2024-10-31 RX ADMIN — DEXAMETHASONE SODIUM PHOSPHATE 10 MG: 10 INJECTION INTRAMUSCULAR; INTRAVENOUS at 10:17

## 2024-10-31 RX ADMIN — KETOROLAC TROMETHAMINE 60 MG: 30 INJECTION, SOLUTION INTRAMUSCULAR; INTRAVENOUS at 10:17

## 2024-10-31 NOTE — PROGRESS NOTES
"Ambulatory Visit  Name: Tati Higuera      : 1960      MRN: [id number]  Encounter Provider: HEIDE Smith  Encounter Date: 10/31/2024   Encounter department: [facility]    Assessment & Plan  Coccydynia  X-ray, donut cushion follow-up if unresolved  Orders:    dexamethasone (DECADRON) injection 10 mg    ketorolac (TORADOL) 60 mg/2 mL IM injection 60 mg    celecoxib (CeleBREX) 200 mg capsule; Take 1 capsule (200 mg total) by mouth 2 (two) times a day    Acute pain of left shoulder  Follow through with x-ray ordered by provider last night, follow-up if unresolved  Orders:    dexamethasone (DECADRON) injection 10 mg    ketorolac (TORADOL) 60 mg/2 mL IM injection 60 mg    celecoxib (CeleBREX) 200 mg capsule; Take 1 capsule (200 mg total) by mouth 2 (two) times a day       History of Present Illness     Slipped on kitchen floor last night and landed on shoulder and coccyx , significant pain in shoulder and coccyx last night shoulder slightly better with pain 5 out of 10 in the buttocks pain is 7 out of 10  No significant neck or back pain  Good range of motion of neck    Fall          Review of Systems        Objective     /86 (BP Location: Left arm, Patient Position: Sitting, Cuff Size: Adult)   Pulse 88   Ht 5' 1\" (1.549 m)   Wt 68 kg (150 lb)   SpO2 98%   BMI 28.34 kg/m²     Physical Exam  Musculoskeletal:      Comments: No tenderness with palpation of C-spine she is quite tender with palpation of the anterior and superior aspect of left shoulder minimal along biceps and moderate pain with range of motion but mostly the pain is on the superior aspect of the shoulder  Tender along coccyx and left medial buttocks         "

## 2024-11-01 ENCOUNTER — APPOINTMENT (OUTPATIENT)
Dept: RADIOLOGY | Facility: CLINIC | Age: 64
End: 2024-11-01
Payer: COMMERCIAL

## 2024-11-01 DIAGNOSIS — M53.3 COCCYDYNIA: ICD-10-CM

## 2024-11-01 DIAGNOSIS — M25.512 ACUTE PAIN OF LEFT SHOULDER: ICD-10-CM

## 2024-11-01 PROCEDURE — 73030 X-RAY EXAM OF SHOULDER: CPT

## 2024-11-01 PROCEDURE — 72220 X-RAY EXAM SACRUM TAILBONE: CPT

## 2024-11-01 NOTE — RESULT ENCOUNTER NOTE
Notify patient sacrococcyx  x-ray is normal, the shoulder showed no fracture either but it did show some arthritis in the left shoulder so follow-up if symptoms are not improved

## 2024-11-14 ENCOUNTER — APPOINTMENT (OUTPATIENT)
Age: 64
End: 2024-11-14
Payer: COMMERCIAL

## 2024-11-14 DIAGNOSIS — R73.09 ABNORMAL GLUCOSE: ICD-10-CM

## 2024-11-14 LAB
EST. AVERAGE GLUCOSE BLD GHB EST-MCNC: 111 MG/DL
HBA1C MFR BLD: 5.5 %

## 2024-11-14 PROCEDURE — 36415 COLL VENOUS BLD VENIPUNCTURE: CPT

## 2024-11-14 PROCEDURE — 83036 HEMOGLOBIN GLYCOSYLATED A1C: CPT

## 2024-11-18 ENCOUNTER — RESULTS FOLLOW-UP (OUTPATIENT)
Dept: FAMILY MEDICINE CLINIC | Facility: CLINIC | Age: 64
End: 2024-11-18

## 2024-11-18 DIAGNOSIS — R73.09 ABNORMAL GLUCOSE: Primary | ICD-10-CM

## 2024-12-03 DIAGNOSIS — F31.81 MODERATE BIPOLAR II DISORDER, MAJOR DEPRESSIVE EPISODE, IN FULL REMISSION, WITH MIXED FEATURES (HCC): ICD-10-CM

## 2024-12-03 DIAGNOSIS — M54.2 NECK PAIN: ICD-10-CM

## 2024-12-03 RX ORDER — CYCLOBENZAPRINE HCL 10 MG
10 TABLET ORAL 3 TIMES DAILY PRN
Qty: 90 TABLET | Refills: 5 | Status: SHIPPED | OUTPATIENT
Start: 2024-12-03

## 2024-12-04 DIAGNOSIS — F31.81 MODERATE BIPOLAR II DISORDER, MAJOR DEPRESSIVE EPISODE, IN FULL REMISSION, WITH MIXED FEATURES (HCC): ICD-10-CM

## 2024-12-04 RX ORDER — DESVENLAFAXINE 50 MG/1
50 TABLET, FILM COATED, EXTENDED RELEASE ORAL DAILY
Qty: 90 TABLET | Refills: 3 | Status: SHIPPED | OUTPATIENT
Start: 2024-12-04

## 2024-12-04 RX ORDER — CYCLOBENZAPRINE HCL 10 MG
TABLET ORAL
Qty: 270 TABLET | Refills: 3 | Status: SHIPPED | OUTPATIENT
Start: 2024-12-04

## 2024-12-04 RX ORDER — DIVALPROEX SODIUM 500 MG/1
TABLET, DELAYED RELEASE ORAL
Qty: 180 TABLET | Refills: 4 | Status: SHIPPED | OUTPATIENT
Start: 2024-12-04

## 2024-12-05 ENCOUNTER — OFFICE VISIT (OUTPATIENT)
Dept: FAMILY MEDICINE CLINIC | Facility: CLINIC | Age: 64
End: 2024-12-05
Payer: COMMERCIAL

## 2024-12-05 VITALS
SYSTOLIC BLOOD PRESSURE: 118 MMHG | HEART RATE: 87 BPM | OXYGEN SATURATION: 95 % | WEIGHT: 147.2 LBS | BODY MASS INDEX: 27.79 KG/M2 | DIASTOLIC BLOOD PRESSURE: 64 MMHG | TEMPERATURE: 96 F | HEIGHT: 61 IN

## 2024-12-05 DIAGNOSIS — R11.0 NAUSEA: ICD-10-CM

## 2024-12-05 DIAGNOSIS — R63.0 LACK OF APPETITE: Primary | ICD-10-CM

## 2024-12-05 DIAGNOSIS — E66.09 CLASS 2 OBESITY DUE TO EXCESS CALORIES WITHOUT SERIOUS COMORBIDITY WITH BODY MASS INDEX (BMI) OF 35.0 TO 35.9 IN ADULT: ICD-10-CM

## 2024-12-05 DIAGNOSIS — I10 PRIMARY HYPERTENSION: ICD-10-CM

## 2024-12-05 DIAGNOSIS — E66.812 CLASS 2 OBESITY DUE TO EXCESS CALORIES WITHOUT SERIOUS COMORBIDITY WITH BODY MASS INDEX (BMI) OF 35.0 TO 35.9 IN ADULT: ICD-10-CM

## 2024-12-05 PROCEDURE — 99213 OFFICE O/P EST LOW 20 MIN: CPT | Performed by: FAMILY MEDICINE

## 2024-12-05 RX ORDER — HYDROXYZINE HYDROCHLORIDE 25 MG/1
TABLET, FILM COATED ORAL
Qty: 30 TABLET | Refills: 2 | Status: SHIPPED | OUTPATIENT
Start: 2024-12-05

## 2024-12-05 NOTE — ASSESSMENT & PLAN NOTE
Advised symptoms most likely secondary to GLP-1 agonist at high dose.  We have several options: Decrease Wegovy to 1.7 mg subcu weekly, monitor symptoms versus extending the 2.4 mg dose.  She can take 2.4 mg q. 8-10 instead of weekly and see if symptoms change.  She opted for the latter for now.  Will check a BMP to ensure renal function normal.  Follow-up with me in about a month.

## 2024-12-05 NOTE — PROGRESS NOTES
"Name: Tati Higuera      : 1960      MRN: [id number]  Encounter Provider: Anabella Wade DO  Encounter Date: 2024   Encounter department: [facility] PRIMARY [facility]ARE  :  Assessment & Plan  Lack of appetite  Advised symptoms most likely secondary to GLP-1 agonist at high dose.  We have several options: Decrease Wegovy to 1.7 mg subcu weekly, monitor symptoms versus extending the 2.4 mg dose.  She can take 2.4 mg q. 8-10 instead of weekly and see if symptoms change.  She opted for the latter for now.  Will check a BMP to ensure renal function normal.  Follow-up with me in about a month.         Nausea         Class 2 obesity due to excess calories without serious comorbidity with body mass index (BMI) of 35.0 to 35.9 in adult         Primary hypertension    Orders:    Basic metabolic panel; Future           History of Present Illness     GI issues-    C/o lack of appetite and nausea. Has been on high dose wegovy for 4 months; these symptoms present about 3 weeks. Lost about 50lbs, fairly close to goal.  Not vomiting, c/o nausea intermittently. Was constipated presumably 2/2 wegovy, but altered diet (more fiber) and taking stool softener QOD. Also with chronic nasal congestion and PND, fairly recently on abx. No abdominal pain. No increase in GERD. Concerned mostly not \"eating enough\" and she may be dehydrated.      Review of Systems   Constitutional:  Positive for appetite change. Negative for activity change, fatigue, fever and unexpected weight change.   Respiratory:  Negative for shortness of breath.    Gastrointestinal:  Positive for constipation and nausea. Negative for abdominal distention, abdominal pain, blood in stool, diarrhea and vomiting.          Objective   /64 (BP Location: Left arm, Patient Position: Sitting, Cuff Size: Large)   Pulse 87   Temp (!) 96 °F (35.6 °C) (Tympanic)   Ht 5' 1\" (1.549 m)   Wt 66.8 kg (147 lb 3.2 oz)   SpO2 95%   BMI 27.81 kg/m² "      Physical Exam  Vitals and nursing note reviewed.   Constitutional:       General: She is not in acute distress.     Appearance: Normal appearance. She is normal weight. She is not ill-appearing or toxic-appearing.   HENT:      Head: Normocephalic.   Musculoskeletal:         General: Normal range of motion.   Skin:     General: Skin is warm.   Neurological:      General: No focal deficit present.      Mental Status: She is alert and oriented to person, place, and time. Mental status is at baseline.      Cranial Nerves: No cranial nerve deficit.   Psychiatric:         Attention and Perception: Attention and perception normal.         Mood and Affect: Mood normal.         Speech: Speech normal.         Behavior: Behavior normal. Behavior is cooperative.         Thought Content: Thought content normal.         Cognition and Memory: Cognition and memory normal.         Judgment: Judgment normal.

## 2024-12-06 ENCOUNTER — APPOINTMENT (OUTPATIENT)
Age: 64
End: 2024-12-06
Payer: COMMERCIAL

## 2024-12-06 DIAGNOSIS — I10 PRIMARY HYPERTENSION: ICD-10-CM

## 2024-12-06 LAB
ANION GAP SERPL CALCULATED.3IONS-SCNC: 7 MMOL/L (ref 4–13)
BUN SERPL-MCNC: 10 MG/DL (ref 5–25)
CALCIUM SERPL-MCNC: 9.9 MG/DL (ref 8.4–10.2)
CHLORIDE SERPL-SCNC: 100 MMOL/L (ref 96–108)
CO2 SERPL-SCNC: 32 MMOL/L (ref 21–32)
CREAT SERPL-MCNC: 0.66 MG/DL (ref 0.6–1.3)
GFR SERPL CREATININE-BSD FRML MDRD: 94 ML/MIN/1.73SQ M
GLUCOSE P FAST SERPL-MCNC: 89 MG/DL (ref 65–99)
POTASSIUM SERPL-SCNC: 4.5 MMOL/L (ref 3.5–5.3)
SODIUM SERPL-SCNC: 139 MMOL/L (ref 135–147)

## 2024-12-06 PROCEDURE — 36415 COLL VENOUS BLD VENIPUNCTURE: CPT

## 2024-12-06 PROCEDURE — 80048 BASIC METABOLIC PNL TOTAL CA: CPT

## 2024-12-09 ENCOUNTER — RESULTS FOLLOW-UP (OUTPATIENT)
Dept: FAMILY MEDICINE CLINIC | Facility: CLINIC | Age: 64
End: 2024-12-09

## 2024-12-13 ENCOUNTER — CLINICAL SUPPORT (OUTPATIENT)
Dept: FAMILY MEDICINE CLINIC | Facility: CLINIC | Age: 64
End: 2024-12-13
Payer: COMMERCIAL

## 2024-12-13 DIAGNOSIS — M25.551 RIGHT HIP PAIN: Primary | ICD-10-CM

## 2024-12-13 DIAGNOSIS — M70.61 TROCHANTERIC BURSITIS OF RIGHT HIP: Primary | ICD-10-CM

## 2024-12-13 PROCEDURE — 96372 THER/PROPH/DIAG INJ SC/IM: CPT | Performed by: FAMILY MEDICINE

## 2024-12-13 RX ORDER — KETOROLAC TROMETHAMINE 30 MG/ML
60 INJECTION, SOLUTION INTRAMUSCULAR; INTRAVENOUS ONCE
Status: CANCELLED | OUTPATIENT
Start: 2024-12-13 | End: 2024-12-13

## 2024-12-13 RX ORDER — KETOROLAC TROMETHAMINE 30 MG/ML
60 INJECTION, SOLUTION INTRAMUSCULAR; INTRAVENOUS ONCE
Status: COMPLETED | OUTPATIENT
Start: 2024-12-13 | End: 2024-12-13

## 2024-12-13 RX ADMIN — KETOROLAC TROMETHAMINE 60 MG: 30 INJECTION, SOLUTION INTRAMUSCULAR; INTRAVENOUS at 11:46

## 2025-01-15 ENCOUNTER — CLINICAL SUPPORT (OUTPATIENT)
Dept: FAMILY MEDICINE CLINIC | Facility: CLINIC | Age: 65
End: 2025-01-15
Payer: COMMERCIAL

## 2025-01-15 DIAGNOSIS — M54.2 NECK PAIN: Primary | ICD-10-CM

## 2025-01-15 DIAGNOSIS — M50.30 DDD (DEGENERATIVE DISC DISEASE), CERVICAL: Primary | ICD-10-CM

## 2025-01-15 DIAGNOSIS — M47.12 CERVICAL SPONDYLOSIS WITH MYELOPATHY: ICD-10-CM

## 2025-01-15 PROCEDURE — 96372 THER/PROPH/DIAG INJ SC/IM: CPT | Performed by: FAMILY MEDICINE

## 2025-01-15 RX ORDER — KETOROLAC TROMETHAMINE 30 MG/ML
60 INJECTION, SOLUTION INTRAMUSCULAR; INTRAVENOUS ONCE
Status: COMPLETED | OUTPATIENT
Start: 2025-01-15 | End: 2025-01-15

## 2025-01-15 RX ORDER — DEXAMETHASONE SODIUM PHOSPHATE 10 MG/ML
10 INJECTION INTRAMUSCULAR; INTRAVENOUS ONCE
Status: COMPLETED | OUTPATIENT
Start: 2025-01-15 | End: 2025-01-15

## 2025-01-15 RX ADMIN — KETOROLAC TROMETHAMINE 60 MG: 30 INJECTION, SOLUTION INTRAMUSCULAR; INTRAVENOUS at 13:38

## 2025-01-15 RX ADMIN — DEXAMETHASONE SODIUM PHOSPHATE 10 MG: 10 INJECTION INTRAMUSCULAR; INTRAVENOUS at 13:38

## 2025-01-17 ENCOUNTER — RA CDI HCC (OUTPATIENT)
Dept: OTHER | Facility: HOSPITAL | Age: 65
End: 2025-01-17

## 2025-01-17 PROBLEM — E66.09 CLASS 2 OBESITY DUE TO EXCESS CALORIES WITHOUT SERIOUS COMORBIDITY WITH BODY MASS INDEX (BMI) OF 35.0 TO 35.9 IN ADULT: Status: RESOLVED | Noted: 2023-08-02 | Resolved: 2025-01-17

## 2025-01-17 PROBLEM — E66.812 CLASS 2 OBESITY DUE TO EXCESS CALORIES WITHOUT SERIOUS COMORBIDITY WITH BODY MASS INDEX (BMI) OF 35.0 TO 35.9 IN ADULT: Status: RESOLVED | Noted: 2023-08-02 | Resolved: 2025-01-17

## 2025-01-17 NOTE — PROGRESS NOTES
HCC coding opportunities       Chart reviewed, no opportunity found: CHART REVIEWED, NO OPPORTUNITY FOUND      This is a reminder to address (resolve/update/assess) ALL HCC (risk adjustment) codes as found on active problem list for 2025 as patient scores reset to zero AMBREEN.  Patients Insurance        Commercial Insurance: [employer]

## 2025-01-20 DIAGNOSIS — E66.09 CLASS 2 OBESITY DUE TO EXCESS CALORIES WITHOUT SERIOUS COMORBIDITY WITH BODY MASS INDEX (BMI) OF 35.0 TO 35.9 IN ADULT: ICD-10-CM

## 2025-01-20 DIAGNOSIS — E66.812 CLASS 2 OBESITY DUE TO EXCESS CALORIES WITHOUT SERIOUS COMORBIDITY WITH BODY MASS INDEX (BMI) OF 35.0 TO 35.9 IN ADULT: ICD-10-CM

## 2025-01-20 RX ORDER — SEMAGLUTIDE 2.4 MG/.75ML
INJECTION, SOLUTION SUBCUTANEOUS
Qty: 9 ML | Refills: 1 | Status: SHIPPED | OUTPATIENT
Start: 2025-01-20 | End: 2025-07-23

## 2025-01-22 DIAGNOSIS — I10 PRIMARY HYPERTENSION: ICD-10-CM

## 2025-01-22 RX ORDER — LOSARTAN POTASSIUM 25 MG/1
25 TABLET ORAL DAILY
Qty: 90 TABLET | Refills: 0 | Status: SHIPPED | OUTPATIENT
Start: 2025-01-22

## 2025-01-27 ENCOUNTER — OFFICE VISIT (OUTPATIENT)
Dept: FAMILY MEDICINE CLINIC | Facility: CLINIC | Age: 65
End: 2025-01-27
Payer: COMMERCIAL

## 2025-01-27 VITALS
OXYGEN SATURATION: 97 % | HEIGHT: 61 IN | HEART RATE: 81 BPM | SYSTOLIC BLOOD PRESSURE: 128 MMHG | BODY MASS INDEX: 26.66 KG/M2 | DIASTOLIC BLOOD PRESSURE: 72 MMHG | WEIGHT: 141.2 LBS

## 2025-01-27 DIAGNOSIS — I10 PRIMARY HYPERTENSION: ICD-10-CM

## 2025-01-27 DIAGNOSIS — M47.12 CERVICAL SPONDYLOSIS WITH MYELOPATHY: ICD-10-CM

## 2025-01-27 DIAGNOSIS — F31.81 MODERATE BIPOLAR II DISORDER, MAJOR DEPRESSIVE EPISODE, IN FULL REMISSION, WITH MIXED FEATURES (HCC): ICD-10-CM

## 2025-01-27 DIAGNOSIS — M50.30 DDD (DEGENERATIVE DISC DISEASE), CERVICAL: Primary | ICD-10-CM

## 2025-01-27 PROCEDURE — 99213 OFFICE O/P EST LOW 20 MIN: CPT | Performed by: FAMILY MEDICINE

## 2025-01-27 RX ORDER — TRAMADOL HYDROCHLORIDE 50 MG/1
50 TABLET ORAL EVERY 6 HOURS PRN
Qty: 30 TABLET | Refills: 2 | Status: SHIPPED | OUTPATIENT
Start: 2025-01-27

## 2025-01-27 NOTE — ASSESSMENT & PLAN NOTE
No referral to physical therapy.  May need imaging in the future.  Will try tramadol for pain.  Discussed mechanism of action, potential side effects, interactions.  Recheck in 1 month.  Discussed possibility of FMLA related to her neck pain.  Orders:  •  Ambulatory Referral to Physical Therapy; Future  •  traMADol (Ultram) 50 mg tablet; Take 1 tablet (50 mg total) by mouth every 6 (six) hours as needed for moderate pain

## 2025-01-27 NOTE — PROGRESS NOTES
Name: Tati Higuera      : 1960      MRN: [id number]  Encounter Provider: Anabella Wade DO  Encounter Date: 2025   Encounter department: [facility]  :  Assessment & Plan  DDD (degenerative disc disease), cervical    Orders:  •  Ambulatory Referral to Physical Therapy; Future  •  traMADol (Ultram) 50 mg tablet; Take 1 tablet (50 mg total) by mouth every 6 (six) hours as needed for moderate pain    Cervical spondylosis with myelopathy  No referral to physical therapy.  May need imaging in the future.  Will try tramadol for pain.  Discussed mechanism of action, potential side effects, interactions.  Recheck in 1 month.  Discussed possibility of FMLA related to her neck pain.  Orders:  •  Ambulatory Referral to Physical Therapy; Future  •  traMADol (Ultram) 50 mg tablet; Take 1 tablet (50 mg total) by mouth every 6 (six) hours as needed for moderate pain    Moderate bipolar II disorder, major depressive episode, in full remission, with mixed features (HCC)  Wants to keep meds/dosages the same for now.       Primary hypertension  Will try holding losartan for now.  Trend BPs at home with home cuff.  Recheck in 1 month.              History of Present Illness   Neck pain--    requesting PT. Thinks she may need MRI, knows she will need PT 1st. Neck is cracking, painful, limited ROM. H/o fusion- C4-6 about 10 years ago. Did see neurosurg, they also recommended PT and repeated imaging. Has tried lyrica, gabapentin.  Nothing is really controlling pain at this point.  She has tried different formulations of THC, has her medical marijuana card.  This includes topical agents.    Hip pain-- doing well right now. Recently got a new mattress and thinks this is helping as well.     Anxiety--increase stressors at home and at work.  Overall, her depression and anxiety are fairly well-controlled on her medical regimen.     Hypertension--like to try holding her antihypertensive  "medication.  On losartan 25 mg.  We have decreased the dosage as her need for BP lowering agent has decreased as she is lost weight.      Review of Systems   Constitutional:  Negative for activity change, appetite change, fatigue and unexpected weight change.   Musculoskeletal:  Positive for arthralgias, neck pain and neck stiffness.   Neurological:  Negative for dizziness, light-headedness, numbness and headaches.   Psychiatric/Behavioral:  Positive for dysphoric mood. The patient is nervous/anxious.        Objective   /72 (BP Location: Left arm, Patient Position: Sitting, Cuff Size: Adult)   Pulse 81   Ht 5' 1\" (1.549 m)   Wt 64 kg (141 lb 3.2 oz)   SpO2 97%   BMI 26.68 kg/m²      Physical Exam  Vitals and nursing note reviewed.   Constitutional:       General: She is not in acute distress.     Appearance: Normal appearance. She is normal weight. She is not ill-appearing or toxic-appearing.   HENT:      Head: Normocephalic.   Musculoskeletal:         General: Normal range of motion.   Skin:     General: Skin is warm.   Neurological:      General: No focal deficit present.      Mental Status: She is alert and oriented to person, place, and time. Mental status is at baseline.      Cranial Nerves: No cranial nerve deficit.   Psychiatric:         Attention and Perception: Attention and perception normal.         Mood and Affect: Mood normal.         Speech: Speech normal.         Behavior: Behavior normal. Behavior is cooperative.         Thought Content: Thought content normal.         Cognition and Memory: Cognition and memory normal.         Judgment: Judgment normal.         "

## 2025-01-27 NOTE — ASSESSMENT & PLAN NOTE
Orders:  •  Ambulatory Referral to Physical Therapy; Future  •  traMADol (Ultram) 50 mg tablet; Take 1 tablet (50 mg total) by mouth every 6 (six) hours as needed for moderate pain

## 2025-01-30 ENCOUNTER — EVALUATION (OUTPATIENT)
Dept: PHYSICAL THERAPY | Facility: CLINIC | Age: 65
End: 2025-01-30
Payer: COMMERCIAL

## 2025-01-30 VITALS — HEART RATE: 84 BPM | SYSTOLIC BLOOD PRESSURE: 122 MMHG | DIASTOLIC BLOOD PRESSURE: 66 MMHG

## 2025-01-30 DIAGNOSIS — M50.30 DDD (DEGENERATIVE DISC DISEASE), CERVICAL: ICD-10-CM

## 2025-01-30 DIAGNOSIS — M47.12 CERVICAL SPONDYLOSIS WITH MYELOPATHY: ICD-10-CM

## 2025-01-30 PROCEDURE — 97112 NEUROMUSCULAR REEDUCATION: CPT | Performed by: PHYSICAL THERAPIST

## 2025-01-30 PROCEDURE — 97162 PT EVAL MOD COMPLEX 30 MIN: CPT | Performed by: PHYSICAL THERAPIST

## 2025-01-30 NOTE — PROGRESS NOTES
"PT Evaluation     Today's date: 2025  Patient name: Tati iHguera  : 1960  MRN: [id number]  Referring provider: Anabella Wade  Dx:   Encounter Diagnosis     ICD-10-CM    1. DDD (degenerative disc disease), cervical  M50.30 Ambulatory Referral to Physical Therapy      2. Cervical spondylosis with myelopathy  M47.12 Ambulatory Referral to Physical Therapy                     Assessment  Impairments: abnormal or restricted ROM, activity intolerance, impaired physical strength, lacks appropriate home exercise program, pain with function, poor posture , participation limitations and activity limitations  Functional limitations: sleep interruption, painful and limited head turns, pain worsens during work hours and limits tolerance to work activity    Assessment details: Tati Higuera is a 64 y.o. female who presents with complaints of chronic neck pain and L scapular pain. No further referral appears necessary at this time based upon examination results.  Patient presents with poor posture, poor  DNF endurance, poor scapular strength, and limited cervical ROM. Etiologic factors include repetitive poor body mechanics, prolonged sitting at desk for work activity. Prognosis is good given HEP compliance and PT 2x/wk.  Please contact me if you have any questions or recommendations.  Thank you for the opportunity to share in  Tati 's care.     Understanding of Dx/Px/POC: good     Prognosis: good    Goals  STGs  1)  In  4 weeks patient will report 3 points reduced pain \"at worst\"  2) In 4 weeks patient will demonstrate 1/2 grade improvement in scapular strength (MT and LT prone MMT)  3) IN 4 weeks patient will demonstrate improvement in DNF endurance, able to maintain supine chin tuck with head lift for 5 seconds or greater    LTGs  1) In 6-12 weeks patient will report 50% improvement in symptoms during her work day  2) In 6-12 weeks patient will report 50% improvement in sleep without requiring " "medication  3) IN 6-12 weeks patient will be independent with extensive HEP    Plan  Patient would benefit from: skilled physical therapy  Referral necessary: No  Planned modality interventions: thermotherapy: hydrocollator packs and cryotherapy    Planned therapy interventions: IASTM, manual therapy, massage, Mcdaniel taping, home exercise program, stretching, self care, strengthening, therapeutic activities, therapeutic exercise, flexibility, functional ROM exercises, nerve gliding and neuromuscular re-education    Frequency: 2x week  Duration in weeks: 12  Plan of Care beginning date: 1/30/2025  Plan of Care expiration date: 4/24/2025  Treatment plan discussed with: patient        Subjective Evaluation    History of Present Illness  Mechanism of injury: -c/o neck pain  -h/o cervical fusion C4-C5, C5-C6 about 10 yrs ago  -even before that fusion, was recommended to see a chiropractor and at that time had severe pain in her L shoulder blade that was brought on with very light pressure of chiropractor  -that pain never changed, though the N/T down her L UE did stop and she is no longer dropping things from her L hand  -most of her pain is now along her L side of her neck toward her L shoulder blade (\"sometimes feels like it's on fire\")  -certain ways she turns her head, the L side of her neck \"cracks\" and feels out of place, then \"cracks back into place\"   -no symptoms on R side  -sometimes gets a headache along L side of back of head  -most recent MRI was about a year ago  -has not been to pain management since before her surgery 10 yrs ago  -she is now taking Tramadol at night to help her sleep, started this on Monday and it has been helping (previously tried Flexural and gabapentin without relief)  -desk set up was modified and set up appropriately to avoid improper posture while working  Patient Goals  Patient goals for therapy: decreased pain, increased motion and increased strength  Patient goal: #1 goal is " to reduce pain  Pain  Current pain ratin  At best pain ratin  At worst pain ratin    Social Support    Employment status: working (full time  at [employer])  Hand dominance: right  Exercise history: none      Diagnostic Tests  No diagnostic tests performed  Treatments  Current treatment: medication      Objective     Concurrent Complaints  Positive for headaches. Negative for dizziness, faints, nausea/motion sickness, tinnitus, trouble swallowing, difficulty breathing, shortness of breath, respiratory pain and visual change    Tenderness     Additional Tenderness Details  (+) TTP upper and middle thoracic spine, central and L of SPs    Neurological Testing     Sensation   Cervical/Thoracic   Left   Intact: light touch    Right   Intact: light touch    Active Range of Motion   Cervical/Thoracic Spine       Cervical  Subcranial protraction:  WFL   Subcranial retraction:   Restriction level: minimal  Flexion: 32 degrees   Extension: 38 degrees     with pain  Left lateral flexion: 25 degrees      Right lateral flexion: 25 degrees      Left rotation: 50 degrees with pain  Right rotation: 60 degrees     Mechanical Assessment    Cervical    Seated retraction: repeated movements   Pain location: no change  Pain level: produced    Thoracic    Seated extension: repeated movements  Pain location: no change  Pain level: produced    Lumbar      Strength/Myotome Testing   Cervical Spine     Left   Interossei strength (t1): 5    Right   Interossei strength (t1): 5    Left Shoulder     Planes of Motion   Flexion: 4-   Extension: 3+   Abduction: 4-   External rotation at 0°: 4   Internal rotation at 0°: 4     Isolated Muscles   Lower trapezius: 3+   Middle trapezius: 3+     Right Shoulder     Planes of Motion   Flexion: 4   Extension: 4-   Abduction: 4   External rotation at 0°: 4   Internal rotation at 0°: 4     Isolated Muscles   Lower trapezius: 4-   Middle trapezius: 4-     Left Elbow   Flexion:  "4  Extension: 4    Right Elbow   Flexion: 4  Extension: 4    Left Wrist/Hand   Wrist extension: 4+  Wrist flexion: 4+  Thumb extension: 4+    Right Wrist/Hand   Wrist extension: 4+  Wrist flexion: 4+  Thumb extension: 4+    Tests   Cervical   Positive neck flexor muscle endurance test.    Additional Tests Details  DNF endurance: 1 second to failure  Neuro Exam:     Headaches   Patient reports headaches: Yes.             Precautions: h/o cervical fusion (C4-C6)  CO-MORBIDITIES: headaches, bipolar, depression, anxiety, high cholesterol, GERD  HEP ACCESS CODE: [id number] - updated 1/30/25  FOTO Completed On: 1/30/25    POC Expires Reeval for Medicare to be completed Re-eval last completed on Unit Limit Auth Start Date Auth Expiration Date PT/OT/ST  Visit Limit   4/24/25 By visit  n/a  N/a N/a N/a N/a                         TREATMENT DIARY  Auth Status DATE 1/30            NA Visit # 1             Remaining             MANUAL THERAPY             Low grade PA mobs to mid-thoracic spine                                                                              THERAPEUTIC EXERCISE             Doorway pec stretch 20\"x4                                                                                                                                                                        NEUROMUSCULAR REEDUCATION              UBE retro for posture             Chin tucks Seated 5\" 2x10            DNF endurance supine             Scap retraction 5\" 2x10            TB rows             TB pull downs             TB horizontal abduction             TB b/l ER                                                                                                                                                                                      THERAPEUTIC ACTIVITY                                                                                           MODALITIES                                              Access Code: [email or web address]  URL: " [email or web address]  Date: 01/30/2025  Prepared by: Denisse Rosa    Exercises  - Doorway Pec Stretch at 90 Degrees Abduction  - 3 x daily - 7 x weekly - 4 reps - 20 sec hold  - Seated Cervical Retraction  - 3 x daily - 7 x weekly - 2 sets - 10 reps - 5 sec hold  - Seated Scapular Retraction  - 3 x daily - 7 x weekly - 2 sets - 10 reps - 5 sec hold

## 2025-02-03 DIAGNOSIS — K21.9 GASTROESOPHAGEAL REFLUX DISEASE WITHOUT ESOPHAGITIS: ICD-10-CM

## 2025-02-03 RX ORDER — DEXLANSOPRAZOLE 60 MG/1
60 CAPSULE, DELAYED RELEASE ORAL DAILY
Qty: 90 CAPSULE | Refills: 1 | Status: SHIPPED | OUTPATIENT
Start: 2025-02-03

## 2025-02-04 ENCOUNTER — OFFICE VISIT (OUTPATIENT)
Dept: PHYSICAL THERAPY | Facility: CLINIC | Age: 65
End: 2025-02-04
Payer: COMMERCIAL

## 2025-02-04 DIAGNOSIS — M50.30 DDD (DEGENERATIVE DISC DISEASE), CERVICAL: Primary | ICD-10-CM

## 2025-02-04 DIAGNOSIS — M47.12 CERVICAL SPONDYLOSIS WITH MYELOPATHY: ICD-10-CM

## 2025-02-04 PROCEDURE — 97112 NEUROMUSCULAR REEDUCATION: CPT | Performed by: PHYSICAL THERAPIST

## 2025-02-04 PROCEDURE — 97140 MANUAL THERAPY 1/> REGIONS: CPT | Performed by: PHYSICAL THERAPIST

## 2025-02-04 NOTE — PROGRESS NOTES
"Daily Note     Today's date: 2025  Patient name: Tati Higuera  : 1960  MRN: [id number]  Referring provider: Anabella Wade  Dx:   Encounter Diagnosis     ICD-10-CM    1. DDD (degenerative disc disease), cervical  M50.30       2. Cervical spondylosis with myelopathy  M47.12                      Subjective: Pt reports she has been compliant with outlined HEP and denies any questions . As the day goes on, she experiences increased pain.      Objective: See treatment diary below      Assessment: Initiated exercises this date to address impairments noted during initial evaluation. Pt required minimal verbal and tactile cues to perform exercises with correct form and technique this date to avoid over-utilization of upper trapezius muscles. Tolerated treatment well. Patient demonstrated fatigue post treatment, exhibited good technique with therapeutic exercises, and would benefit from continued PT      Plan: Progress treatment as tolerated.       Precautions: h/o cervical fusion (C4-C6)  CO-MORBIDITIES: headaches, bipolar, depression, anxiety, high cholesterol, GERD  HEP ACCESS CODE: [id number] - updated 25  FOTO Completed On: 25    POC Expires Reeval for Medicare to be completed Re-eval last completed on Unit Limit Auth Start Date Auth Expiration Date PT/OT/ST  Visit Limit   25 By visit  n/a  N/a N/a N/a N/a                         TREATMENT DIARY  Auth Status DATE  2-4           NA Visit # 1 2            Remaining             MANUAL THERAPY             Low grade PA mobs to mid-thoracic spine  JG, grade III                                                                            THERAPEUTIC EXERCISE             Doorway pec stretch 20\"x4 20\" x 3                                                                                                                                                                       NEUROMUSCULAR REEDUCATION              UBE retro for posture  8 mins, " "120 rpm           Chin tucks Seated 5\" 2x10 Seated 5\" 2x10           DNF endurance supine  10\" x 3           Scap retraction 5\" 2x10 5\" 2 x 10           TB rows  10 x 5\" RTB           TB pull downs  10 x 5\" RTB           TB horizontal abduction  10 x 5\" YTB           TB b/l ER  10 x 5\" YTB                                                                                                                                                                                    THERAPEUTIC ACTIVITY                                                                                           MODALITIES                                                "

## 2025-02-06 ENCOUNTER — APPOINTMENT (OUTPATIENT)
Dept: PHYSICAL THERAPY | Facility: CLINIC | Age: 65
End: 2025-02-06
Payer: COMMERCIAL

## 2025-02-07 ENCOUNTER — CLINICAL SUPPORT (OUTPATIENT)
Dept: FAMILY MEDICINE CLINIC | Facility: CLINIC | Age: 65
End: 2025-02-07
Payer: COMMERCIAL

## 2025-02-07 ENCOUNTER — TELEPHONE (OUTPATIENT)
Dept: FAMILY MEDICINE CLINIC | Facility: CLINIC | Age: 65
End: 2025-02-07

## 2025-02-07 DIAGNOSIS — J02.9 SORE THROAT: Primary | ICD-10-CM

## 2025-02-07 DIAGNOSIS — R52 BODY ACHES: ICD-10-CM

## 2025-02-07 LAB
SARS-COV-2 AG UPPER RESP QL IA: NEGATIVE
SL AMB POCT RAPID FLU A: NEGATIVE
SL AMB POCT RAPID FLU B: NEGATIVE
VALID CONTROL: NORMAL

## 2025-02-07 PROCEDURE — 87804 INFLUENZA ASSAY W/OPTIC: CPT | Performed by: FAMILY MEDICINE

## 2025-02-07 PROCEDURE — 87811 SARS-COV-2 COVID19 W/OPTIC: CPT | Performed by: FAMILY MEDICINE

## 2025-02-07 NOTE — TELEPHONE ENCOUNTER
Patient has sore throat, aches, congestion and fatigue for about 2 days, would like to be tested for flu/covid

## 2025-02-11 ENCOUNTER — OFFICE VISIT (OUTPATIENT)
Dept: PHYSICAL THERAPY | Facility: CLINIC | Age: 65
End: 2025-02-11
Payer: COMMERCIAL

## 2025-02-11 DIAGNOSIS — M50.30 DDD (DEGENERATIVE DISC DISEASE), CERVICAL: Primary | ICD-10-CM

## 2025-02-11 DIAGNOSIS — M47.12 CERVICAL SPONDYLOSIS WITH MYELOPATHY: ICD-10-CM

## 2025-02-11 PROCEDURE — 97140 MANUAL THERAPY 1/> REGIONS: CPT | Performed by: PHYSICAL THERAPIST

## 2025-02-11 PROCEDURE — 97112 NEUROMUSCULAR REEDUCATION: CPT | Performed by: PHYSICAL THERAPIST

## 2025-02-11 NOTE — PROGRESS NOTES
"Daily Note     Today's date: 2025  Patient name: Tati Higuera  : 1960  MRN: [id number]  Referring provider: Anabella Wade*  Dx:   Encounter Diagnosis     ICD-10-CM    1. DDD (degenerative disc disease), cervical  M50.30       2. Cervical spondylosis with myelopathy  M47.12                      Subjective: Patient reports she was sore for a day or two following last session.      Objective: See treatment diary below      Assessment: Progressed exercises this session as charted to enhance upper extremity strength and endurance. Pt continues to require minimal verbal cues for proper form and technique. Improvement in thoracic mobility noted following manual therapy techniques this date. Tolerated treatment well. Patient demonstrated fatigue post treatment, exhibited good technique with therapeutic exercises, and would benefit from continued PT      Plan: Progress treatment as tolerated.       Precautions: h/o cervical fusion (C4-C6)  CO-MORBIDITIES: headaches, bipolar, depression, anxiety, high cholesterol, GERD  HEP ACCESS CODE: [id number] - updated 25  FOTO Completed On: 25    POC Expires Reeval for Medicare to be completed Re-eval last completed on Unit Limit Auth Start Date Auth Expiration Date PT/OT/ST  Visit Limit   25 By visit  n/a  N/a N/a N/a N/a                         TREATMENT DIARY  Auth Status DATE  2-4 2-11          NA Visit # 1 2 3           Remaining             MANUAL THERAPY             Low grade PA mobs to mid-thoracic spine  JG, grade III JG, grade III                                                                           THERAPEUTIC EXERCISE             Doorway pec stretch 20\"x4 20\" x 3 20\" x 3                       Standing:             - shoulder flex, scap, abd to 90 deg   2 x 10 ea                                                                                                                               NEUROMUSCULAR REEDUCATION              UBE " "retro for posture  8 mins, 120 rpm 8 mins, 120 rpm          Chin tucks Seated 5\" 2x10 Seated 5\" 2x10 Seated 5\" 2x10          DNF endurance supine  10\" x 3 10\" x 3          Scap retraction 5\" 2x10 5\" 2 x 10 5\" 2 x 10          TB rows  10 x 5\" RTB 10 x 5\" GTB          TB pull downs  10 x 5\" RTB 10 x 5\" GTB          TB horizontal abduction  10 x 5\" YTB 10 x 5\" YTB          TB b/l ER  10 x 5\" YTB 10 x 5\" YTB                                                                                                                                                                                   THERAPEUTIC ACTIVITY                                                                                           MODALITIES                                                  "

## 2025-02-13 ENCOUNTER — OFFICE VISIT (OUTPATIENT)
Dept: PHYSICAL THERAPY | Facility: CLINIC | Age: 65
End: 2025-02-13
Payer: COMMERCIAL

## 2025-02-13 DIAGNOSIS — M47.12 CERVICAL SPONDYLOSIS WITH MYELOPATHY: Primary | ICD-10-CM

## 2025-02-13 DIAGNOSIS — M50.30 DDD (DEGENERATIVE DISC DISEASE), CERVICAL: ICD-10-CM

## 2025-02-13 PROCEDURE — 97110 THERAPEUTIC EXERCISES: CPT | Performed by: PHYSICAL THERAPIST

## 2025-02-13 PROCEDURE — 97140 MANUAL THERAPY 1/> REGIONS: CPT | Performed by: PHYSICAL THERAPIST

## 2025-02-13 PROCEDURE — 97112 NEUROMUSCULAR REEDUCATION: CPT | Performed by: PHYSICAL THERAPIST

## 2025-02-13 NOTE — PROGRESS NOTES
"Daily Note     Today's date: 2025  Patient name: Tati Higuera  : 1960  MRN: [id number]  Referring provider: Anabella Wade*  Dx:   Encounter Diagnosis     ICD-10-CM    1. Cervical spondylosis with myelopathy  M47.12       2. DDD (degenerative disc disease), cervical  M50.30                      Subjective: Pt reports she felt really good following last session. Today she reports some soreness because it was a stressful day.      Objective: See treatment diary below      Assessment: Progressed exercises this session as charted to enhance functional strength, endurance, and postural awareness. Pt was able to complete all exercises this date with minimal increase in pain/discomfort. Tolerated treatment well. Patient demonstrated fatigue post treatment, exhibited good technique with therapeutic exercises, and would benefit from continued PT      Plan: Progress treatment as tolerated.       Precautions: h/o cervical fusion (C4-C6)  CO-MORBIDITIES: headaches, bipolar, depression, anxiety, high cholesterol, GERD  HEP ACCESS CODE: [id number] - updated 25  FOTO Completed On: 25    POC Expires Reeval for Medicare to be completed Re-eval last completed on Unit Limit Auth Start Date Auth Expiration Date PT/OT/ST  Visit Limit   25 By visit  n/a  N/a N/a N/a N/a                         TREATMENT DIARY  Auth Status DATE  2-4 2-11 2-13         NA Visit # 1 2 3 4          Remaining             MANUAL THERAPY             Low grade PA mobs to mid-thoracic spine  JG, grade III JG, grade III JG, grade III                                                                          THERAPEUTIC EXERCISE             Doorway pec stretch 20\"x4 20\" x 3 20\" x 3 20\" x 3         Seated thoracic extension over towel roll   20 x 5\" 20 x 5\"                      Standing:             - shoulder flex, scap, abd to 90 deg   2 x 10 ea  2  x 10 ea                                                                      " "                                                        NEUROMUSCULAR REEDUCATION              UBE retro for posture  8 mins, 120 rpm 8 mins, 120 rpm 10 mins, 120 rpm         Chin tucks Seated 5\" 2x10 Seated 5\" 2x10 Seated 5\" 2x10 Seated 5\" 2x10         DNF endurance supine  10\" x 3 10\" x 3 10\" x 3         Scap retraction 5\" 2x10 5\" 2 x 10 5\" 2 x 10 5\" 2 x 10         TB rows  10 x 5\" RTB 10 x 5\" GTB 10 x 5\" GTB         TB pull downs  10 x 5\" RTB 10 x 5\" GTB 10 x 5\" GTB         TB horizontal abduction  10 x 5\" YTB 10 x 5\" YTB 10 x 5' YTB         TB b/l ER  10 x 5\" YTB 10 x 5\" YTB 10 x 5\" YTB                                                                                                                                                                                  THERAPEUTIC ACTIVITY                                                                                           MODALITIES                                                    "

## 2025-02-18 ENCOUNTER — APPOINTMENT (OUTPATIENT)
Dept: PHYSICAL THERAPY | Facility: CLINIC | Age: 65
End: 2025-02-18
Payer: COMMERCIAL

## 2025-02-20 ENCOUNTER — APPOINTMENT (OUTPATIENT)
Dept: PHYSICAL THERAPY | Facility: CLINIC | Age: 65
End: 2025-02-20
Payer: COMMERCIAL

## 2025-02-21 ENCOUNTER — RA CDI HCC (OUTPATIENT)
Dept: OTHER | Facility: HOSPITAL | Age: 65
End: 2025-02-21

## 2025-02-25 ENCOUNTER — APPOINTMENT (OUTPATIENT)
Dept: PHYSICAL THERAPY | Facility: CLINIC | Age: 65
End: 2025-02-25
Payer: COMMERCIAL

## 2025-02-25 ENCOUNTER — OFFICE VISIT (OUTPATIENT)
Dept: FAMILY MEDICINE CLINIC | Facility: CLINIC | Age: 65
End: 2025-02-25
Payer: COMMERCIAL

## 2025-02-25 VITALS
DIASTOLIC BLOOD PRESSURE: 88 MMHG | SYSTOLIC BLOOD PRESSURE: 124 MMHG | OXYGEN SATURATION: 99 % | HEART RATE: 85 BPM | HEIGHT: 61 IN | WEIGHT: 133.2 LBS | TEMPERATURE: 97.1 F | BODY MASS INDEX: 25.15 KG/M2

## 2025-02-25 DIAGNOSIS — E66.812 CLASS 2 OBESITY DUE TO EXCESS CALORIES WITHOUT SERIOUS COMORBIDITY WITH BODY MASS INDEX (BMI) OF 35.0 TO 35.9 IN ADULT: ICD-10-CM

## 2025-02-25 DIAGNOSIS — F31.81 MODERATE BIPOLAR II DISORDER, MAJOR DEPRESSIVE EPISODE, IN FULL REMISSION, WITH MIXED FEATURES (HCC): ICD-10-CM

## 2025-02-25 DIAGNOSIS — R73.09 ABNORMAL GLUCOSE: ICD-10-CM

## 2025-02-25 DIAGNOSIS — I10 PRIMARY HYPERTENSION: Primary | ICD-10-CM

## 2025-02-25 DIAGNOSIS — M70.61 TROCHANTERIC BURSITIS OF RIGHT HIP: ICD-10-CM

## 2025-02-25 DIAGNOSIS — M47.12 CERVICAL SPONDYLOSIS WITH MYELOPATHY: ICD-10-CM

## 2025-02-25 DIAGNOSIS — E66.09 CLASS 2 OBESITY DUE TO EXCESS CALORIES WITHOUT SERIOUS COMORBIDITY WITH BODY MASS INDEX (BMI) OF 35.0 TO 35.9 IN ADULT: ICD-10-CM

## 2025-02-25 PROCEDURE — 99213 OFFICE O/P EST LOW 20 MIN: CPT | Performed by: FAMILY MEDICINE

## 2025-02-25 NOTE — PROGRESS NOTES
"Name: Tati Higuera      : 1960      MRN: [id number]  Encounter Provider: Anabella Wade DO  Encounter Date: 2025   Encounter department: [facility] PRIMARY CARE  :  Assessment & Plan  Primary hypertension  Off all meds now. Stable Bps.        Cervical spondylosis with myelopathy  PT helping. To continue.        Trochanteric bursitis of right hip  No pain currently.        Moderate bipolar II disorder, major depressive episode, in full remission, with mixed features (HCC)  Keep meds/dosing same.        Abnormal glucose         Class 2 obesity due to excess calories without serious comorbidity with body mass index (BMI) of 35.0 to 35.9 in adult  Maintenance dosing of Wegovy.               History of Present Illness   Neck pain--    continues to see PT which is helping. Wants to wait on MRI. Rare use of ultram. Overall improved symptoms.     Hip pain-- doing well right now. Recently got a new mattress and thinks this is helping as well.     Anxiety--increased stressors at home and at work.  Overall, her depression and anxiety are fairly well-controlled on her medical regimen.     Hypertension-- holding losartan since , only one dizzy spell since. BP stable off med.     Obesity-- at max dose GLP1 over 6 months, at goal wt. Spacing injections out now. About a week an a 1/2 between.       Review of Systems   Constitutional:  Negative for activity change, appetite change, fatigue and unexpected weight change.   Musculoskeletal:  Positive for arthralgias and neck pain. Negative for neck stiffness.   Neurological:  Negative for dizziness, light-headedness, numbness and headaches.   Psychiatric/Behavioral:  Negative for dysphoric mood. The patient is not nervous/anxious.        Objective   /88 (BP Location: Left arm, Patient Position: Sitting, Cuff Size: Adult)   Pulse 85   Temp (!) 97.1 °F (36.2 °C) (Tympanic)   Ht 5' 1\" (1.549 m)   Wt 60.4 kg (133 lb 3.2 oz)   SpO2 " 99%   BMI 25.17 kg/m²      Physical Exam  Vitals and nursing note reviewed.   Constitutional:       General: She is not in acute distress.     Appearance: Normal appearance. She is normal weight. She is not ill-appearing or toxic-appearing.   HENT:      Head: Normocephalic.   Musculoskeletal:         General: Normal range of motion.   Skin:     General: Skin is warm.   Neurological:      General: No focal deficit present.      Mental Status: She is alert and oriented to person, place, and time. Mental status is at baseline.      Cranial Nerves: No cranial nerve deficit.   Psychiatric:         Attention and Perception: Attention and perception normal.         Mood and Affect: Mood normal.         Speech: Speech normal.         Behavior: Behavior normal. Behavior is cooperative.         Thought Content: Thought content normal.         Cognition and Memory: Cognition and memory normal.         Judgment: Judgment normal.

## 2025-02-27 ENCOUNTER — APPOINTMENT (OUTPATIENT)
Dept: PHYSICAL THERAPY | Facility: CLINIC | Age: 65
End: 2025-02-27
Payer: COMMERCIAL

## 2025-03-05 ENCOUNTER — OFFICE VISIT (OUTPATIENT)
Dept: FAMILY MEDICINE CLINIC | Facility: CLINIC | Age: 65
End: 2025-03-05
Payer: COMMERCIAL

## 2025-03-05 VITALS
BODY MASS INDEX: 25.45 KG/M2 | SYSTOLIC BLOOD PRESSURE: 132 MMHG | WEIGHT: 134.8 LBS | HEART RATE: 72 BPM | TEMPERATURE: 97 F | OXYGEN SATURATION: 99 % | HEIGHT: 61 IN | DIASTOLIC BLOOD PRESSURE: 80 MMHG

## 2025-03-05 DIAGNOSIS — R05.9 COUGH, UNSPECIFIED TYPE: ICD-10-CM

## 2025-03-05 DIAGNOSIS — R09.81 NASAL CONGESTION: ICD-10-CM

## 2025-03-05 DIAGNOSIS — J01.00 ACUTE NON-RECURRENT MAXILLARY SINUSITIS: Primary | ICD-10-CM

## 2025-03-05 LAB
SARS-COV-2 AG UPPER RESP QL IA: NEGATIVE
VALID CONTROL: NORMAL

## 2025-03-05 PROCEDURE — 99213 OFFICE O/P EST LOW 20 MIN: CPT | Performed by: FAMILY MEDICINE

## 2025-03-05 PROCEDURE — 87811 SARS-COV-2 COVID19 W/OPTIC: CPT | Performed by: FAMILY MEDICINE

## 2025-03-05 PROCEDURE — 96372 THER/PROPH/DIAG INJ SC/IM: CPT | Performed by: FAMILY MEDICINE

## 2025-03-05 RX ORDER — AZITHROMYCIN 250 MG/1
TABLET, FILM COATED ORAL
Qty: 6 TABLET | Refills: 0 | Status: SHIPPED | OUTPATIENT
Start: 2025-03-05 | End: 2025-03-10

## 2025-03-05 RX ORDER — DEXAMETHASONE SODIUM PHOSPHATE 10 MG/ML
10 INJECTION, SOLUTION INTRA-ARTICULAR; INTRALESIONAL; INTRAMUSCULAR; INTRAVENOUS; SOFT TISSUE ONCE
Status: COMPLETED | OUTPATIENT
Start: 2025-03-05 | End: 2025-03-05

## 2025-03-05 RX ADMIN — DEXAMETHASONE SODIUM PHOSPHATE 10 MG: 10 INJECTION, SOLUTION INTRA-ARTICULAR; INTRALESIONAL; INTRAMUSCULAR; INTRAVENOUS; SOFT TISSUE at 14:22

## 2025-03-05 NOTE — ASSESSMENT & PLAN NOTE
Orders:  •  azithromycin (Zithromax) 250 mg tablet; Take 2 tablets (500 mg total) by mouth daily for 1 day, THEN 1 tablet (250 mg total) daily for 4 days.  •  dexamethasone (DECADRON) injection 10 mg

## 2025-03-05 NOTE — PROGRESS NOTES
Name: Tati Higuera      : 1960      MRN: [id number]  Encounter Provider: Anabella Wade DO  Encounter Date: 3/5/2025   Encounter department: [facility]  :  Assessment & Plan  Acute non-recurrent maxillary sinusitis    Orders:  •  azithromycin (Zithromax) 250 mg tablet; Take 2 tablets (500 mg total) by mouth daily for 1 day, THEN 1 tablet (250 mg total) daily for 4 days.  •  dexamethasone (DECADRON) injection 10 mg    Cough, unspecified type    Orders:  •  POCT Rapid Covid Ag    Nasal congestion    Orders:  •  POCT Rapid Covid Ag           History of Present Illness   Sore Throat   This is a new problem. The current episode started in the past 7 days. The problem has been rapidly worsening. There has been no fever. Associated symptoms include congestion, coughing, headaches and a hoarse voice. Pertinent negatives include no abdominal pain, ear discharge, ear pain, shortness of breath or trouble swallowing. She has had no exposure to strep or mono.   Cough  The cough is Non-productive. Associated symptoms include headaches, postnasal drip, rhinorrhea and a sore throat. Pertinent negatives include no chest pain, ear pain, fever or shortness of breath.   Fatigue  Associated symptoms include congestion, coughing, fatigue, headaches and a sore throat. Pertinent negatives include no abdominal pain, chest pain or fever.     Review of Systems   Constitutional:  Positive for appetite change and fatigue. Negative for activity change and fever.   HENT:  Positive for congestion, dental problem, hoarse voice, postnasal drip, rhinorrhea, sinus pressure, sinus pain and sore throat. Negative for ear discharge, ear pain, nosebleeds and trouble swallowing.    Respiratory:  Positive for cough. Negative for shortness of breath.    Cardiovascular:  Negative for chest pain.   Gastrointestinal:  Negative for abdominal pain.   Neurological:  Positive for headaches.       Objective   BP  "132/80 (BP Location: Left arm, Patient Position: Sitting, Cuff Size: Adult)   Pulse 72   Temp (!) 97 °F (36.1 °C) (Tympanic)   Ht 5' 1\" (1.549 m)   Wt 61.1 kg (134 lb 12.8 oz)   SpO2 99%   BMI 25.47 kg/m²      Physical Exam  Vitals and nursing note reviewed.   Constitutional:       General: She is not in acute distress.     Appearance: Normal appearance. She is normal weight. She is not ill-appearing or toxic-appearing.   HENT:      Head: Normocephalic.      Right Ear: Tympanic membrane, ear canal and external ear normal.      Left Ear: Tympanic membrane, ear canal and external ear normal.      Nose: Nose normal.      Mouth/Throat:      Mouth: Mucous membranes are moist.      Pharynx: Oropharynx is clear. Postnasal drip present.   Eyes:      Pupils: Pupils are equal, round, and reactive to light.   Cardiovascular:      Rate and Rhythm: Normal rate and regular rhythm.      Pulses: Normal pulses.      Heart sounds: No murmur heard.  Pulmonary:      Effort: Pulmonary effort is normal. No respiratory distress.      Breath sounds: Normal breath sounds.   Musculoskeletal:      Cervical back: Normal range of motion and neck supple. No rigidity or tenderness.   Lymphadenopathy:      Cervical: Cervical adenopathy present.   Skin:     General: Skin is warm.      Capillary Refill: Capillary refill takes less than 2 seconds.   Neurological:      General: No focal deficit present.      Mental Status: She is alert. Mental status is at baseline.   Psychiatric:         Mood and Affect: Mood normal.         Thought Content: Thought content normal.         Judgment: Judgment normal.         "

## 2025-03-08 DIAGNOSIS — F31.81 MODERATE BIPOLAR II DISORDER, MAJOR DEPRESSIVE EPISODE, IN FULL REMISSION, WITH MIXED FEATURES (HCC): ICD-10-CM

## 2025-03-09 RX ORDER — DESVENLAFAXINE 50 MG/1
50 TABLET, FILM COATED, EXTENDED RELEASE ORAL DAILY
Qty: 90 TABLET | Refills: 0 | Status: SHIPPED | OUTPATIENT
Start: 2025-03-09

## 2025-04-04 PROBLEM — J01.00 ACUTE NON-RECURRENT MAXILLARY SINUSITIS: Status: RESOLVED | Noted: 2025-03-05 | Resolved: 2025-04-04

## 2025-04-12 ENCOUNTER — HOSPITAL ENCOUNTER (EMERGENCY)
Facility: HOSPITAL | Age: 65
Discharge: HOME | End: 2025-04-13
Attending: EMERGENCY MEDICINE | Admitting: EMERGENCY MEDICINE
Payer: COMMERCIAL

## 2025-04-12 ENCOUNTER — APPOINTMENT (EMERGENCY)
Dept: RADIOLOGY | Facility: HOSPITAL | Age: 65
End: 2025-04-12
Payer: COMMERCIAL

## 2025-04-12 DIAGNOSIS — M54.2 NECK PAIN: ICD-10-CM

## 2025-04-12 DIAGNOSIS — R94.31 NONSPECIFIC ABNORMAL ELECTROCARDIOGRAM (ECG) (EKG): ICD-10-CM

## 2025-04-12 DIAGNOSIS — S01.81XA LACERATION OF FOREHEAD, INITIAL ENCOUNTER: ICD-10-CM

## 2025-04-12 DIAGNOSIS — R55 SYNCOPE: Primary | ICD-10-CM

## 2025-04-12 DIAGNOSIS — E83.42 HYPOMAGNESEMIA: ICD-10-CM

## 2025-04-12 DIAGNOSIS — I25.10 CORONARY ARTERY CALCIFICATION: ICD-10-CM

## 2025-04-12 DIAGNOSIS — S12.9XXA CERVICAL TRANSVERSE PROCESS FRACTURE (HCC): ICD-10-CM

## 2025-04-12 DIAGNOSIS — E87.6 HYPOKALEMIA: ICD-10-CM

## 2025-04-12 DIAGNOSIS — F41.9 ANXIETY: ICD-10-CM

## 2025-04-12 PROCEDURE — 83735 ASSAY OF MAGNESIUM: CPT | Performed by: EMERGENCY MEDICINE

## 2025-04-12 PROCEDURE — 71045 X-RAY EXAM CHEST 1 VIEW: CPT

## 2025-04-12 PROCEDURE — 82077 ASSAY SPEC XCP UR&BREATH IA: CPT | Performed by: EMERGENCY MEDICINE

## 2025-04-12 PROCEDURE — 85025 COMPLETE CBC W/AUTO DIFF WBC: CPT | Performed by: EMERGENCY MEDICINE

## 2025-04-12 PROCEDURE — 36415 COLL VENOUS BLD VENIPUNCTURE: CPT | Performed by: EMERGENCY MEDICINE

## 2025-04-12 PROCEDURE — 80053 COMPREHEN METABOLIC PANEL: CPT | Performed by: EMERGENCY MEDICINE

## 2025-04-12 PROCEDURE — 99285 EMERGENCY DEPT VISIT HI MDM: CPT

## 2025-04-12 PROCEDURE — 84484 ASSAY OF TROPONIN QUANT: CPT | Performed by: EMERGENCY MEDICINE

## 2025-04-12 RX ORDER — LORAZEPAM 2 MG/ML
0.5 INJECTION INTRAMUSCULAR ONCE
Status: COMPLETED | OUTPATIENT
Start: 2025-04-13 | End: 2025-04-13

## 2025-04-12 RX ORDER — LIDOCAINE HYDROCHLORIDE AND EPINEPHRINE 10; 10 MG/ML; UG/ML
20 INJECTION, SOLUTION INFILTRATION; PERINEURAL ONCE
Status: COMPLETED | OUTPATIENT
Start: 2025-04-13 | End: 2025-04-13

## 2025-04-12 NOTE — Clinical Note
Tati Higuera was seen and treated in our emergency department on 4/12/2025.        No work until cleared by Family Doctor/Orthopedics        Diagnosis:     Tati  .    She may return on this date:          If you have any questions or concerns, please don't hesitate to call.      Hunter Eden, DO    ______________________________           _______________          _______________  Hospital Representative                              Date                                Time

## 2025-04-13 ENCOUNTER — APPOINTMENT (EMERGENCY)
Dept: CT IMAGING | Facility: HOSPITAL | Age: 65
End: 2025-04-13
Payer: COMMERCIAL

## 2025-04-13 ENCOUNTER — APPOINTMENT (OUTPATIENT)
Dept: CT IMAGING | Facility: HOSPITAL | Age: 65
End: 2025-04-13
Payer: COMMERCIAL

## 2025-04-13 VITALS
OXYGEN SATURATION: 99 % | DIASTOLIC BLOOD PRESSURE: 62 MMHG | BODY MASS INDEX: 25.32 KG/M2 | RESPIRATION RATE: 19 BRPM | WEIGHT: 134 LBS | HEART RATE: 74 BPM | SYSTOLIC BLOOD PRESSURE: 139 MMHG | TEMPERATURE: 98.2 F

## 2025-04-13 LAB
2HR DELTA HS TROPONIN: 0 NG/L
ALBUMIN SERPL BCG-MCNC: 4 G/DL (ref 3.5–5)
ALP SERPL-CCNC: 46 U/L (ref 34–104)
ALT SERPL W P-5'-P-CCNC: 7 U/L (ref 7–52)
ANION GAP SERPL CALCULATED.3IONS-SCNC: 12 MMOL/L (ref 4–13)
AST SERPL W P-5'-P-CCNC: 14 U/L (ref 13–39)
ATRIAL RATE: 86 BPM
ATRIAL RATE: 87 BPM
BASOPHILS # BLD AUTO: 0.05 THOUSANDS/ÂΜL (ref 0–0.1)
BASOPHILS NFR BLD AUTO: 1 % (ref 0–1)
BILIRUB SERPL-MCNC: 0.34 MG/DL (ref 0.2–1)
BUN SERPL-MCNC: 8 MG/DL (ref 5–25)
CALCIUM SERPL-MCNC: 9.7 MG/DL (ref 8.4–10.2)
CARDIAC TROPONIN I PNL SERPL HS: 3 NG/L (ref ?–50)
CARDIAC TROPONIN I PNL SERPL HS: 3 NG/L (ref ?–50)
CHLORIDE SERPL-SCNC: 99 MMOL/L (ref 96–108)
CO2 SERPL-SCNC: 27 MMOL/L (ref 21–32)
CREAT SERPL-MCNC: 0.69 MG/DL (ref 0.6–1.3)
EOSINOPHIL # BLD AUTO: 0.28 THOUSAND/ÂΜL (ref 0–0.61)
EOSINOPHIL NFR BLD AUTO: 3 % (ref 0–6)
ERYTHROCYTE [DISTWIDTH] IN BLOOD BY AUTOMATED COUNT: 14.5 % (ref 11.6–15.1)
ETHANOL SERPL-MCNC: 61 MG/DL
GFR SERPL CREATININE-BSD FRML MDRD: 92 ML/MIN/1.73SQ M
GLUCOSE SERPL-MCNC: 98 MG/DL (ref 65–140)
HCT VFR BLD AUTO: 40.5 % (ref 34.8–46.1)
HGB BLD-MCNC: 13.6 G/DL (ref 11.5–15.4)
IMM GRANULOCYTES # BLD AUTO: 0.04 THOUSAND/UL (ref 0–0.2)
IMM GRANULOCYTES NFR BLD AUTO: 0 % (ref 0–2)
LYMPHOCYTES # BLD AUTO: 5.24 THOUSANDS/ÂΜL (ref 0.6–4.47)
LYMPHOCYTES NFR BLD AUTO: 50 % (ref 14–44)
MAGNESIUM SERPL-MCNC: 1.7 MG/DL (ref 1.9–2.7)
MCH RBC QN AUTO: 32.3 PG (ref 26.8–34.3)
MCHC RBC AUTO-ENTMCNC: 33.6 G/DL (ref 31.4–37.4)
MCV RBC AUTO: 96 FL (ref 82–98)
MONOCYTES # BLD AUTO: 0.91 THOUSAND/ÂΜL (ref 0.17–1.22)
MONOCYTES NFR BLD AUTO: 9 % (ref 4–12)
NEUTROPHILS # BLD AUTO: 3.77 THOUSANDS/ÂΜL (ref 1.85–7.62)
NEUTS SEG NFR BLD AUTO: 37 % (ref 43–75)
NRBC BLD AUTO-RTO: 0 /100 WBCS
P AXIS: 72 DEGREES
P AXIS: 74 DEGREES
PLATELET # BLD AUTO: 162 THOUSANDS/UL (ref 149–390)
PMV BLD AUTO: 10.9 FL (ref 8.9–12.7)
POTASSIUM SERPL-SCNC: 3.1 MMOL/L (ref 3.5–5.3)
PR INTERVAL: 138 MS
PR INTERVAL: 142 MS
PROT SERPL-MCNC: 6.6 G/DL (ref 6.4–8.4)
QRS AXIS: 60 DEGREES
QRS AXIS: 64 DEGREES
QRSD INTERVAL: 80 MS
QRSD INTERVAL: 82 MS
QT INTERVAL: 298 MS
QT INTERVAL: 340 MS
QTC INTERVAL: 358 MS
QTC INTERVAL: 406 MS
RBC # BLD AUTO: 4.21 MILLION/UL (ref 3.81–5.12)
SODIUM SERPL-SCNC: 138 MMOL/L (ref 135–147)
T WAVE AXIS: 245 DEGREES
T WAVE AXIS: 250 DEGREES
VENTRICULAR RATE: 86 BPM
VENTRICULAR RATE: 87 BPM
WBC # BLD AUTO: 10.29 THOUSAND/UL (ref 4.31–10.16)

## 2025-04-13 PROCEDURE — 99285 EMERGENCY DEPT VISIT HI MDM: CPT | Performed by: EMERGENCY MEDICINE

## 2025-04-13 PROCEDURE — 96366 THER/PROPH/DIAG IV INF ADDON: CPT

## 2025-04-13 PROCEDURE — 93005 ELECTROCARDIOGRAM TRACING: CPT

## 2025-04-13 PROCEDURE — 70496 CT ANGIOGRAPHY HEAD: CPT

## 2025-04-13 PROCEDURE — 70450 CT HEAD/BRAIN W/O DYE: CPT

## 2025-04-13 PROCEDURE — 71260 CT THORAX DX C+: CPT

## 2025-04-13 PROCEDURE — 96375 TX/PRO/DX INJ NEW DRUG ADDON: CPT

## 2025-04-13 PROCEDURE — 96376 TX/PRO/DX INJ SAME DRUG ADON: CPT

## 2025-04-13 PROCEDURE — 84484 ASSAY OF TROPONIN QUANT: CPT | Performed by: EMERGENCY MEDICINE

## 2025-04-13 PROCEDURE — 12001 RPR S/N/AX/GEN/TRNK 2.5CM/<: CPT | Performed by: EMERGENCY MEDICINE

## 2025-04-13 PROCEDURE — 70498 CT ANGIOGRAPHY NECK: CPT

## 2025-04-13 PROCEDURE — 72125 CT NECK SPINE W/O DYE: CPT

## 2025-04-13 PROCEDURE — 93010 ELECTROCARDIOGRAM REPORT: CPT | Performed by: INTERNAL MEDICINE

## 2025-04-13 PROCEDURE — 36415 COLL VENOUS BLD VENIPUNCTURE: CPT | Performed by: EMERGENCY MEDICINE

## 2025-04-13 PROCEDURE — 96365 THER/PROPH/DIAG IV INF INIT: CPT

## 2025-04-13 PROCEDURE — 74177 CT ABD & PELVIS W/CONTRAST: CPT

## 2025-04-13 RX ORDER — POTASSIUM CHLORIDE 1500 MG/1
20 TABLET, EXTENDED RELEASE ORAL ONCE
Status: COMPLETED | OUTPATIENT
Start: 2025-04-13 | End: 2025-04-13

## 2025-04-13 RX ORDER — OXYCODONE AND ACETAMINOPHEN 5; 325 MG/1; MG/1
1 TABLET ORAL ONCE
Refills: 0 | Status: COMPLETED | OUTPATIENT
Start: 2025-04-13 | End: 2025-04-13

## 2025-04-13 RX ORDER — FENTANYL CITRATE 50 UG/ML
50 INJECTION, SOLUTION INTRAMUSCULAR; INTRAVENOUS ONCE
Refills: 0 | Status: COMPLETED | OUTPATIENT
Start: 2025-04-13 | End: 2025-04-13

## 2025-04-13 RX ORDER — CEFUROXIME AXETIL 500 MG/1
500 TABLET ORAL EVERY 12 HOURS SCHEDULED
Qty: 10 TABLET | Refills: 0 | Status: SHIPPED | OUTPATIENT
Start: 2025-04-13 | End: 2025-04-18

## 2025-04-13 RX ORDER — BACITRACIN, NEOMYCIN, POLYMYXIN B 400; 3.5; 5 [USP'U]/G; MG/G; [USP'U]/G
1 OINTMENT TOPICAL ONCE
Status: COMPLETED | OUTPATIENT
Start: 2025-04-13 | End: 2025-04-13

## 2025-04-13 RX ORDER — MAGNESIUM SULFATE HEPTAHYDRATE 40 MG/ML
2 INJECTION, SOLUTION INTRAVENOUS ONCE
Status: COMPLETED | OUTPATIENT
Start: 2025-04-13 | End: 2025-04-13

## 2025-04-13 RX ORDER — CEFUROXIME AXETIL 250 MG/1
500 TABLET ORAL ONCE
Status: COMPLETED | OUTPATIENT
Start: 2025-04-13 | End: 2025-04-13

## 2025-04-13 RX ORDER — OXYCODONE AND ACETAMINOPHEN 5; 325 MG/1; MG/1
1 TABLET ORAL EVERY 6 HOURS PRN
Qty: 15 TABLET | Refills: 0 | Status: SHIPPED | OUTPATIENT
Start: 2025-04-13 | End: 2025-04-17 | Stop reason: SDUPTHER

## 2025-04-13 RX ORDER — FENTANYL CITRATE 50 UG/ML
25 INJECTION, SOLUTION INTRAMUSCULAR; INTRAVENOUS ONCE
Refills: 0 | Status: COMPLETED | OUTPATIENT
Start: 2025-04-13 | End: 2025-04-13

## 2025-04-13 RX ORDER — IBUPROFEN 600 MG/1
600 TABLET, FILM COATED ORAL EVERY 6 HOURS PRN
Qty: 30 TABLET | Refills: 0 | Status: SHIPPED | OUTPATIENT
Start: 2025-04-13

## 2025-04-13 RX ADMIN — LIDOCAINE HYDROCHLORIDE,EPINEPHRINE BITARTRATE 20 ML: 10; .01 INJECTION, SOLUTION INFILTRATION; PERINEURAL at 00:30

## 2025-04-13 RX ADMIN — MAGNESIUM SULFATE HEPTAHYDRATE 2 G: 40 INJECTION, SOLUTION INTRAVENOUS at 00:43

## 2025-04-13 RX ADMIN — IOHEXOL 100 ML: 350 INJECTION, SOLUTION INTRAVENOUS at 00:18

## 2025-04-13 RX ADMIN — LORAZEPAM 0.5 MG: 2 INJECTION INTRAMUSCULAR; INTRAVENOUS at 00:25

## 2025-04-13 RX ADMIN — FENTANYL CITRATE 50 MCG: 50 INJECTION INTRAMUSCULAR; INTRAVENOUS at 02:06

## 2025-04-13 RX ADMIN — POTASSIUM CHLORIDE 20 MEQ: 1500 TABLET, EXTENDED RELEASE ORAL at 04:20

## 2025-04-13 RX ADMIN — CEFUROXIME AXETIL 500 MG: 250 TABLET ORAL at 03:01

## 2025-04-13 RX ADMIN — SODIUM CHLORIDE 1000 ML: 0.9 INJECTION, SOLUTION INTRAVENOUS at 00:32

## 2025-04-13 RX ADMIN — OXYCODONE HYDROCHLORIDE AND ACETAMINOPHEN 1 TABLET: 5; 325 TABLET ORAL at 04:20

## 2025-04-13 RX ADMIN — Medication 1 APPLICATION: at 00:27

## 2025-04-13 RX ADMIN — BACITRACIN ZINC, NEOMYCIN, POLYMYXIN B SULFAT 1 SMALL APPLICATION: 5000; 3.5; 4 OINTMENT TOPICAL at 03:04

## 2025-04-13 RX ADMIN — IOHEXOL 75 ML: 350 INJECTION, SOLUTION INTRAVENOUS at 01:40

## 2025-04-13 RX ADMIN — FENTANYL CITRATE 25 MCG: 50 INJECTION INTRAMUSCULAR; INTRAVENOUS at 00:40

## 2025-04-13 NOTE — ED PROVIDER NOTES
Emergency Department Trauma Note  Tati Higuera 65 y.o. female MRN: [id number]  Unit/Bed#: ED 27/ED 27 Encounter: [id number]      Trauma Alert: Trauma Acuity: Trauma Evaluation  Model of Arrival: Mode of Arrival: ALS via    Trauma Team: Current Providers  Attending Provider: Hunter Eden DO  Registered Nurse: Christina Weller RN  Consultants:     None      History of Present Illness     Chief Complaint:   Chief Complaint   Patient presents with    Trauma     HPI:  Tati Higuera is a 65 y.o. female who presents with facial laceration after a fall.  Syncopal episode..  Mechanism:Details of Incident: pt trip and fall hitting left side of forehead on fireplace mantle, pt 5-10 second LOC , no blood thinners , ETOH tonight Injury Date: 04/12/25        65-year-old female presents after syncopal episode.  Patient set up had LOC, fell forward onto a fireplace mantle striking her head.  She did have loss of consciousness and sustained a laceration to the left forehead.  She complains of a headache.  He denies anticoagulation.  Tetanus up-to-date.        Review of Systems   All other systems reviewed and are negative.      Historical Information     Immunizations:   Immunization History   Administered Date(s) Administered    COVID-19 MODERNA VACC 0.5 ML IM 03/17/2021, 04/14/2021, 12/17/2021    Hep B, adult 01/28/2019, 02/28/2019, 07/03/2019    INFLUENZA 10/15/2017, 10/07/2018, 11/06/2024    Influenza Quadrivalent 3 years and older 09/30/2020    Influenza, injectable, quadrivalent, preservative free 0.5 mL 10/06/2023    Pneumococcal Conjugate Vaccine 20-valent (Pcv20), Polysace 12/15/2023    Tdap 04/26/2011, 02/09/2014, 10/10/2024    Zoster Vaccine Recombinant 02/15/2024, 08/16/2024       Past Medical History:   Diagnosis Date    Anxiety     Bipolar disorder (HCC)     Class 2 obesity due to excess calories without serious comorbidity with body mass index (BMI) of 35.0 to 35.9 in adult 08/02/2023    COVID  12/28/2023    Depression     GERD (gastroesophageal reflux disease)     Headache     after anesthesia    Headache(784.0)     History of meningitis     age 5    Hx of fusion of cervical spine 09/21/2023    Hyperlipidemia     Localized edema 12/15/2023    migraines     Nasal congestion 03/11/2024    Obesity     Primary hypertension 10/12/2023    Wears glasses        Family History   Problem Relation Age of Onset    Pseudochol deficiency Mother     COPD Mother         Pseudomonas of the lungs    Depression Mother     Alcohol abuse Father     Cancer Father     No Known Problems Sister     No Known Problems Daughter     No Known Problems Maternal Grandmother     No Known Problems Paternal Grandmother     No Known Problems Paternal Aunt     No Known Problems Paternal Aunt     No Known Problems Paternal Aunt      Past Surgical History:   Procedure Laterality Date    BREAST BIOPSY Right     benign    CYST REMOVAL Right     hand    CYSTOSCOPY N/A 02/08/2017    Procedure: CYSTOSCOPY;  Surgeon: Sarath Suresh MD;  [facility];  Service:     EXAMINATION UNDER ANESTHESIA N/A 02/08/2017    Procedure: EXAM UNDER ANESTHESIA, RESECTION OF VAGINAL GRAFT;  Surgeon: Sarath Suresh MD;  Location[facility];  Service:     HYSTERECTOMY      AZ CYSTOURETHROSCOPY N/A 09/27/2017    Procedure: CYSTOSCOPY;  Surgeon: Sarath Suresh MD;  [facility];  Service: UroGynecology           AZ REVJ/RMVL PROSTHETIC VAGINAL GRAFT VAGINAL JANI N/A 09/27/2017    Procedure: RESECTION OF EXPOSED MESH VAGINAL AND PARAVAGINAL TISSUE;  Surgeon: Sarath Suresh MD;  Location[facility];  Service: UroGynecology           SPINAL FUSION  2015    C4-C5 and C5-C6    TONSILLECTOMY      TOTAL VAGINAL HYSTERECTOMY      TUBAL LIGATION       Social History     Tobacco Use    Smoking status: Every Day     Current packs/day: 0.25     Average packs/day: 0.3 packs/day for 45.2 years (11.3 ttl pk-yrs)     Types: Cigarettes     Passive exposure: Current     Smokeless tobacco: Never   Vaping Use    Vaping status: Every Day    Substances: THC   Substance Use Topics    Alcohol use: Yes     Alcohol/week: 5.0 standard drinks of alcohol     Types: 5 Glasses of wine per week     Comment: socially    Drug use: Yes     Frequency: 7.0 times per week     Types: Marijuana     Comment: Medical Marijuana     E-Cigarette/Vaping    E-Cigarette Use Current Every Day User      E-Cigarette/Vaping Substances    Nicotine No     THC Yes     CBD No     Flavoring No     Other No     Unknown No        Family History: non-contributory    Meds/Allergies   Prior to Admission Medications   Prescriptions Last Dose Informant Patient Reported? Taking?   Fluticasone-Salmeterol (Advair Diskus) 250-50 mcg/dose inhaler   No No   Sig: Inhale 1 puff 2 (two) times a day Rinse mouth after use.   Patient not taking: Reported on 3/5/2025   Semaglutide-Weight Management (Wegovy) 2.4 MG/0.75ML   No No   Sig: Inject 2.4 mg under the skin weekly   azelastine (ASTELIN) 0.1 % nasal spray   No No   Si spray into each nostril 2 (two) times a day Use in each nostril as directed   cyclobenzaprine (FLEXERIL) 10 mg tablet   No No   Sig: Take 1 tablet (10 mg total) by mouth 3 (three) times a day as needed for muscle spasms   cyclobenzaprine (FLEXERIL) 10 mg tablet   No No   Sig: Take 1 tablet (10 mg total) by mouth 3 times a day as needed for muscle spasms   desvenlafaxine succinate (PRISTIQ) 50 mg 24 hr tablet   No No   Sig: Take 1 tablet (50 mg total) by mouth daily   dexlansoprazole (DEXILANT) 60 MG capsule   No No   Sig: Take 1 capsule (60 mg total) by mouth daily   divalproex sodium (Depakote) 500 mg DR tablet   No No   Si tablets PO @ HS   fluticasone (FLONASE) 50 mcg/act nasal spray   No No   Si spray into each nostril daily   hydrOXYzine HCL (ATARAX) 25 mg tablet   No No   Sig: TAKE (1) TABLET EVERY SIX HOURS AS NEEDED FORITCHING...   ondansetron (ZOFRAN-ODT) 4 mg disintegrating tablet   No No   Sig:  Take 1 tablet (4 mg total) by mouth every 6 (six) hours as needed for nausea or vomiting   traMADol (Ultram) 50 mg tablet   No No   Sig: Take 1 tablet (50 mg total) by mouth every 6 (six) hours as needed for moderate pain   triamcinolone (KENALOG) 0.1 % cream   No No   Sig: Apply topically 2 (two) times a day      Facility-Administered Medications: None       No Known Allergies    PHYSICAL EXAM    PE limited by: none    Objective   Vitals:   First set: Temperature: 97.8 °F (36.6 °C) (04/12/25 2347)  Pulse: 82 (04/12/25 2347)  Respirations: 20 (04/12/25 2347)  Blood Pressure: 130/62 (04/12/25 2347)  SpO2: 99 % (04/12/25 2347)    Primary Survey:   (A) Airway: intact, collar by EMS  (B) Breathing: intact  (C) Circulation: Pulses:   normal  (D) Disabliity:  GCS Total:  15  (E) Expose:  Completed    Secondary Survey: (Click on Physical Exam tab above)  Physical Exam  Vitals and nursing note reviewed.   Constitutional:       Appearance: Normal appearance. She is well-developed.   HENT:      Head: Normocephalic.      Comments: Approximately 6 cm laceration to the left forehead, hemostatic  Eyes:      Conjunctiva/sclera: Conjunctivae normal.      Pupils: Pupils are equal, round, and reactive to light.   Neck:      Trachea: No tracheal deviation.   Cardiovascular:      Rate and Rhythm: Normal rate and regular rhythm.      Heart sounds: Normal heart sounds. No murmur heard.  Pulmonary:      Effort: Pulmonary effort is normal. No respiratory distress.      Breath sounds: Normal breath sounds. No wheezing or rales.      Comments: tachypneic  Abdominal:      General: Bowel sounds are normal. There is no distension.      Palpations: Abdomen is soft.      Tenderness: There is no abdominal tenderness.   Musculoskeletal:         General: Tenderness present. No deformity.      Cervical back: Normal range of motion and neck supple.      Comments: Diffuse c spine tenderness paraspinal, upper thoracic tenderness  Pelvis stable   Skin:      General: Skin is warm and dry.      Capillary Refill: Capillary refill takes less than 2 seconds.   Neurological:      General: No focal deficit present.      Mental Status: She is alert and oriented to person, place, and time.      Sensory: Sensation is intact. No sensory deficit.      Motor: No abnormal muscle tone.      Coordination: Coordination is intact.   Psychiatric:         Mood and Affect: Mood is anxious.         Judgment: Judgment normal.         Cervical spine cleared by clinical criteria? No (imaging required)      Invasive Devices       Peripheral Intravenous Line  Duration             Peripheral IV 04/12/25 Right Antecubital <1 day                    Lab Results:   Results Reviewed       Procedure Component Value Units Date/Time    HS Troponin I 2hr [[id number]]  (Normal) Collected: 04/13/25 0153    Lab Status: Final result Specimen: Blood from Arm, Right Updated: 04/13/25 0220     hs TnI 2hr 3 ng/L      Delta 2hr hsTnI 0 ng/L     HS Troponin 0hr (reflex protocol) [[id number]]  (Normal) Collected: 04/12/25 2355    Lab Status: Final result Specimen: Blood from Arm, Right Updated: 04/13/25 0057     hs TnI 0hr 3 ng/L     Comprehensive metabolic panel [[id number]]  (Abnormal) Collected: 04/12/25 2355    Lab Status: Final result Specimen: Blood from Arm, Right Updated: 04/13/25 0018     Sodium 138 mmol/L      Potassium 3.1 mmol/L      Chloride 99 mmol/L      CO2 27 mmol/L      ANION GAP 12 mmol/L      BUN 8 mg/dL      Creatinine 0.69 mg/dL      Glucose 98 mg/dL      Calcium 9.7 mg/dL      AST 14 U/L      ALT 7 U/L      Alkaline Phosphatase 46 U/L      Total Protein 6.6 g/dL      Albumin 4.0 g/dL      Total Bilirubin 0.34 mg/dL      eGFR 92 ml/min/1.73sq m     Narrative:      [employer] guidelines for Chronic Kidney Disease (CKD):     Stage 1 with normal or high GFR (GFR > 90 mL/min/1.73 square meters)    Stage 2 Mild CKD (GFR = 60-89 mL/min/1.73 square meters)    Stage 3A Moderate  CKD (GFR = 45-59 mL/min/1.73 square meters)    Stage 3B Moderate CKD (GFR = 30-44 mL/min/1.73 square meters)    Stage 4 Severe CKD (GFR = 15-29 mL/min/1.73 square meters)    Stage 5 End Stage CKD (GFR <15 mL/min/1.73 square meters)  Note: GFR calculation is accurate only with a steady state creatinine    Ethanol [[id number]]  (Abnormal) Collected: 04/12/25 2355    Lab Status: Final result Specimen: Blood from Arm, Right Updated: 04/13/25 0018     Ethanol Lvl 61 mg/dL     Magnesium [[id number]]  (Abnormal) Collected: 04/12/25 2355    Lab Status: Final result Specimen: Blood from Arm, Right Updated: 04/13/25 0018     Magnesium 1.7 mg/dL     CBC and differential [[id number]]  (Abnormal) Collected: 04/12/25 2355    Lab Status: Final result Specimen: Blood from Arm, Right Updated: 04/13/25 0000     WBC 10.29 Thousand/uL      RBC 4.21 Million/uL      Hemoglobin 13.6 g/dL      Hematocrit 40.5 %      MCV 96 fL      MCH 32.3 pg      MCHC 33.6 g/dL      RDW 14.5 %      MPV 10.9 fL      Platelets 162 Thousands/uL      nRBC 0 /100 WBCs      Segmented % 37 %      Immature Grans % 0 %      Lymphocytes % 50 %      Monocytes % 9 %      Eosinophils Relative 3 %      Basophils Relative 1 %      Absolute Neutrophils 3.77 Thousands/µL      Absolute Immature Grans 0.04 Thousand/uL      Absolute Lymphocytes 5.24 Thousands/µL      Absolute Monocytes 0.91 Thousand/µL      Eosinophils Absolute 0.28 Thousand/µL      Basophils Absolute 0.05 Thousands/µL                    Imaging Studies:   Direct to CT: No  CTA head and neck with and without contrast   Final Result by Lazarus Winkler DO (04/13 0400)      No evidence of acute vascular injury, occlusion, severe stenosis or aneurysm of the major arteries of the head and neck.      No significant stenosis within either internal carotid artery. Less than 50% stenosis by NASCET criteria.      Patent bilateral vertebral arteries.      Other findings as above.                  Workstation  performed: [id number]         TRAUMA - CT chest abdomen pelvis w contrast   Final Result by Lazarus Winkler DO (04/13 2919)      Moderate to large left frontal scalp hematoma/laceration. No calvarial fracture or acute intracranial abnormality is seen.      Sinus disease as described, correlate to exclude acute sinusitis.      Other findings as above.            CT CERVICAL SPINE - WITHOUT CONTRAST      INDICATION:   TRAUMA. Fall, head trauma      COMPARISON:  None.      TECHNIQUE:  CT examination of the cervical spine was performed without intravenous contrast.  Contiguous axial images were obtained. Multiplanar 2D reformatted images were created from the source data.      Radiation dose length product (DLP) for this visit:  361.95 mGy-cm , 0 mGy-cm , 945.41 mGy-cm , 361.95 mGy-cm.  This examination, like all CT scans performed in the [facility], was performed utilizing techniques to minimize radiation    dose exposure, including the use of iterative reconstruction and automated exposure control.      IMAGE QUALITY:  Diagnostic.      FINDINGS:      ALIGNMENT: Spinal alignment appears maintained.      VERTEBRAE: No compression fracture. Fracture. Anterior plate and screw fusion of C5, C6, and C7. Lucency through the transverse processes of C4 and C6 on the left, suspicious for nondisplaced fracture(s).      DEGENERATIVE CHANGES: Intervertebral disc space narrowing at C3/C4 and C4/C5. Multilevel anterior, marginal, and uncovertebral osteophytosis.      PREVERTEBRAL AND PARASPINAL SOFT TISSUES: Grossly unremarkable      THORACIC INLET:  Please refer to the concurrent chest CT report for thoracic inlet findings.         IMPRESSION:      No compression fracture. Anterior plate and screw fusion of C5, C6, and C7. Lucency through the transverse processes of C4 and C6 on the left, suspicious for nondisplaced fracture(s).      Spinal alignment appears maintained. Recommend CTA of the neck to  evaluate for vascular injury.            CT CHEST, ABDOMEN AND PELVIS WITH IV CONTRAST      CT THORACOLUMBAR SPINE WITHOUT CONTRAST      INDICATION: TRAUMA.      COMPARISON: None.      TECHNIQUE: CT examination of the chest, abdomen and pelvis was performed. Multiplanar 2D reformatted images were created from the source data.      This examination, like all CT scans performed in the [facility], was performed utilizing techniques to minimize radiation dose exposure, including the use of iterative reconstruction and automated exposure control. Radiation dose length    product (DLP) for this visit: 361.95 mGy-cm , 0 mGy-cm , 945.41 mGy-cm , 361.95 mGy-cm      IV Contrast: Was administered   Enteric Contrast: Not administered.      FINDINGS:      CHEST      LUNGS: Mild atelectasis noted dependently, lungs otherwise appear grossly clear.      PLEURA: Unremarkable.      HEART/GREAT VESSELS: Heart appears normal in size. Mild coronary atherosclerosis. Mild aortic atherosclerosis. No thoracic aortic aneurysm.      MEDIASTINUM AND DALI: Unremarkable.      CHEST WALL AND LOWER NECK: Unremarkable.      ABDOMEN      LIVER/BILIARY TREE: Unremarkable.      GALLBLADDER: No calcified gallstones. No pericholecystic inflammatory change.      SPLEEN: Unremarkable.      PANCREAS: Unremarkable.      ADRENAL GLANDS: 1 cm nodule in the right adrenal gland.      KIDNEYS/URETERS: Simple renal cyst(s). Otherwise unremarkable kidneys. No hydronephrosis.      STOMACH AND BOWEL: Mild thickening of the colonic wall near the hepatic flexure and proximal transverse colon, possibly exaggerated by under distention, consider a mild colitis. Otherwise grossly unremarkable. No evidence of bowel obstruction.      APPENDIX: Normal caliber appendix.      ABDOMINOPELVIC CAVITY: No ascites. No pneumoperitoneum. No lymphadenopathy.      VESSELS: Mild atherosclerosis, no aortic aneurysm      PELVIS      REPRODUCTIVE ORGANS: Uterus not well  seen, suspect prior hysterectomy      URINARY BLADDER: Unremarkable.      ABDOMINAL WALL/INGUINAL REGIONS: Unremarkable.      BONES: Multilevel degenerative changes of the spine.      Multifactorial mild spinal canal narrowing at L1/L2. Moderate bilateral neural foraminal narrowing.      Multifactorial mild spinal canal narrowing at L2/L3 with mild bilateral neural foraminal narrowing.      Multifactorial moderate to severe spinal canal narrowing at L3/L4 with moderate bilateral neural foraminal narrowing.      Multifactorial severe spinal canal narrowing at L4/L5 with moderate bilateral neural foraminal narrowing, worse on the left.      No acute fracture or suspicious osseous lesion.         IMPRESSION:      No evidence of acute traumatic injury in the chest, abdomen, or pelvis.      Mild thickening of the colonic wall near the hepatic flexure and proximal transverse colon, possibly exaggerated by under distention, consider a mild colitis. Correlation with the patient's symptoms and laboratory values recommended.      Coronary atherosclerosis, right adrenal nodule, degenerative changes of the spine, and other findings as above.            I personally discussed this study with DELICIA HOLLOWAY on 4/13/2025 1:16 AM.            Workstation performed: [id number]         CT recon only thoracolumbar (No Charge)   Final Result by Lazarus Winkler DO (04/13 0129)      Moderate to large left frontal scalp hematoma/laceration. No calvarial fracture or acute intracranial abnormality is seen.      Sinus disease as described, correlate to exclude acute sinusitis.      Other findings as above.            CT CERVICAL SPINE - WITHOUT CONTRAST      INDICATION:   TRAUMA. Fall, head trauma      COMPARISON:  None.      TECHNIQUE:  CT examination of the cervical spine was performed without intravenous contrast.  Contiguous axial images were obtained. Multiplanar 2D reformatted images were created from the source data.       Radiation dose length product (DLP) for this visit:  361.95 mGy-cm , 0 mGy-cm , 945.41 mGy-cm , 361.95 mGy-cm.  This examination, like all CT scans performed in the [facility], was performed utilizing techniques to minimize radiation    dose exposure, including the use of iterative reconstruction and automated exposure control.      IMAGE QUALITY:  Diagnostic.      FINDINGS:      ALIGNMENT: Spinal alignment appears maintained.      VERTEBRAE: No compression fracture. Fracture. Anterior plate and screw fusion of C5, C6, and C7. Lucency through the transverse processes of C4 and C6 on the left, suspicious for nondisplaced fracture(s).      DEGENERATIVE CHANGES: Intervertebral disc space narrowing at C3/C4 and C4/C5. Multilevel anterior, marginal, and uncovertebral osteophytosis.      PREVERTEBRAL AND PARASPINAL SOFT TISSUES: Grossly unremarkable      THORACIC INLET:  Please refer to the concurrent chest CT report for thoracic inlet findings.         IMPRESSION:      No compression fracture. Anterior plate and screw fusion of C5, C6, and C7. Lucency through the transverse processes of C4 and C6 on the left, suspicious for nondisplaced fracture(s).      Spinal alignment appears maintained. Recommend CTA of the neck to evaluate for vascular injury.            CT CHEST, ABDOMEN AND PELVIS WITH IV CONTRAST      CT THORACOLUMBAR SPINE WITHOUT CONTRAST      INDICATION: TRAUMA.      COMPARISON: None.      TECHNIQUE: CT examination of the chest, abdomen and pelvis was performed. Multiplanar 2D reformatted images were created from the source data.      This examination, like all CT scans performed in the [facility], was performed utilizing techniques to minimize radiation dose exposure, including the use of iterative reconstruction and automated exposure control. Radiation dose length    product (DLP) for this visit: 361.95 mGy-cm , 0 mGy-cm , 945.41 mGy-cm , 361.95 mGy-cm      IV Contrast:  Was administered   Enteric Contrast: Not administered.      FINDINGS:      CHEST      LUNGS: Mild atelectasis noted dependently, lungs otherwise appear grossly clear.      PLEURA: Unremarkable.      HEART/GREAT VESSELS: Heart appears normal in size. Mild coronary atherosclerosis. Mild aortic atherosclerosis. No thoracic aortic aneurysm.      MEDIASTINUM AND DALI: Unremarkable.      CHEST WALL AND LOWER NECK: Unremarkable.      ABDOMEN      LIVER/BILIARY TREE: Unremarkable.      GALLBLADDER: No calcified gallstones. No pericholecystic inflammatory change.      SPLEEN: Unremarkable.      PANCREAS: Unremarkable.      ADRENAL GLANDS: 1 cm nodule in the right adrenal gland.      KIDNEYS/URETERS: Simple renal cyst(s). Otherwise unremarkable kidneys. No hydronephrosis.      STOMACH AND BOWEL: Mild thickening of the colonic wall near the hepatic flexure and proximal transverse colon, possibly exaggerated by under distention, consider a mild colitis. Otherwise grossly unremarkable. No evidence of bowel obstruction.      APPENDIX: Normal caliber appendix.      ABDOMINOPELVIC CAVITY: No ascites. No pneumoperitoneum. No lymphadenopathy.      VESSELS: Mild atherosclerosis, no aortic aneurysm      PELVIS      REPRODUCTIVE ORGANS: Uterus not well seen, suspect prior hysterectomy      URINARY BLADDER: Unremarkable.      ABDOMINAL WALL/INGUINAL REGIONS: Unremarkable.      BONES: Multilevel degenerative changes of the spine.      Multifactorial mild spinal canal narrowing at L1/L2. Moderate bilateral neural foraminal narrowing.      Multifactorial mild spinal canal narrowing at L2/L3 with mild bilateral neural foraminal narrowing.      Multifactorial moderate to severe spinal canal narrowing at L3/L4 with moderate bilateral neural foraminal narrowing.      Multifactorial severe spinal canal narrowing at L4/L5 with moderate bilateral neural foraminal narrowing, worse on the left.      No acute fracture or suspicious osseous lesion.          IMPRESSION:      No evidence of acute traumatic injury in the chest, abdomen, or pelvis.      Mild thickening of the colonic wall near the hepatic flexure and proximal transverse colon, possibly exaggerated by under distention, consider a mild colitis. Correlation with the patient's symptoms and laboratory values recommended.      Coronary atherosclerosis, right adrenal nodule, degenerative changes of the spine, and other findings as above.            I personally discussed this study with DELICIA HOLLOWAY on 4/13/2025 1:16 AM.            Workstation performed: [id number]         TRAUMA - CT spine cervical wo contrast   Final Result by Lazarus Winkler DO (04/13 0129)      Moderate to large left frontal scalp hematoma/laceration. No calvarial fracture or acute intracranial abnormality is seen.      Sinus disease as described, correlate to exclude acute sinusitis.      Other findings as above.            CT CERVICAL SPINE - WITHOUT CONTRAST      INDICATION:   TRAUMA. Fall, head trauma      COMPARISON:  None.      TECHNIQUE:  CT examination of the cervical spine was performed without intravenous contrast.  Contiguous axial images were obtained. Multiplanar 2D reformatted images were created from the source data.      Radiation dose length product (DLP) for this visit:  361.95 mGy-cm , 0 mGy-cm , 945.41 mGy-cm , 361.95 mGy-cm.  This examination, like all CT scans performed in the [facility], was performed utilizing techniques to minimize radiation    dose exposure, including the use of iterative reconstruction and automated exposure control.      IMAGE QUALITY:  Diagnostic.      FINDINGS:      ALIGNMENT: Spinal alignment appears maintained.      VERTEBRAE: No compression fracture. Fracture. Anterior plate and screw fusion of C5, C6, and C7. Lucency through the transverse processes of C4 and C6 on the left, suspicious for nondisplaced fracture(s).      DEGENERATIVE CHANGES:  Intervertebral disc space narrowing at C3/C4 and C4/C5. Multilevel anterior, marginal, and uncovertebral osteophytosis.      PREVERTEBRAL AND PARASPINAL SOFT TISSUES: Grossly unremarkable      THORACIC INLET:  Please refer to the concurrent chest CT report for thoracic inlet findings.         IMPRESSION:      No compression fracture. Anterior plate and screw fusion of C5, C6, and C7. Lucency through the transverse processes of C4 and C6 on the left, suspicious for nondisplaced fracture(s).      Spinal alignment appears maintained. Recommend CTA of the neck to evaluate for vascular injury.            CT CHEST, ABDOMEN AND PELVIS WITH IV CONTRAST      CT THORACOLUMBAR SPINE WITHOUT CONTRAST      INDICATION: TRAUMA.      COMPARISON: None.      TECHNIQUE: CT examination of the chest, abdomen and pelvis was performed. Multiplanar 2D reformatted images were created from the source data.      This examination, like all CT scans performed in the [facility], was performed utilizing techniques to minimize radiation dose exposure, including the use of iterative reconstruction and automated exposure control. Radiation dose length    product (DLP) for this visit: 361.95 mGy-cm , 0 mGy-cm , 945.41 mGy-cm , 361.95 mGy-cm      IV Contrast: Was administered   Enteric Contrast: Not administered.      FINDINGS:      CHEST      LUNGS: Mild atelectasis noted dependently, lungs otherwise appear grossly clear.      PLEURA: Unremarkable.      HEART/GREAT VESSELS: Heart appears normal in size. Mild coronary atherosclerosis. Mild aortic atherosclerosis. No thoracic aortic aneurysm.      MEDIASTINUM AND DALI: Unremarkable.      CHEST WALL AND LOWER NECK: Unremarkable.      ABDOMEN      LIVER/BILIARY TREE: Unremarkable.      GALLBLADDER: No calcified gallstones. No pericholecystic inflammatory change.      SPLEEN: Unremarkable.      PANCREAS: Unremarkable.      ADRENAL GLANDS: 1 cm nodule in the right adrenal gland.       KIDNEYS/URETERS: Simple renal cyst(s). Otherwise unremarkable kidneys. No hydronephrosis.      STOMACH AND BOWEL: Mild thickening of the colonic wall near the hepatic flexure and proximal transverse colon, possibly exaggerated by under distention, consider a mild colitis. Otherwise grossly unremarkable. No evidence of bowel obstruction.      APPENDIX: Normal caliber appendix.      ABDOMINOPELVIC CAVITY: No ascites. No pneumoperitoneum. No lymphadenopathy.      VESSELS: Mild atherosclerosis, no aortic aneurysm      PELVIS      REPRODUCTIVE ORGANS: Uterus not well seen, suspect prior hysterectomy      URINARY BLADDER: Unremarkable.      ABDOMINAL WALL/INGUINAL REGIONS: Unremarkable.      BONES: Multilevel degenerative changes of the spine.      Multifactorial mild spinal canal narrowing at L1/L2. Moderate bilateral neural foraminal narrowing.      Multifactorial mild spinal canal narrowing at L2/L3 with mild bilateral neural foraminal narrowing.      Multifactorial moderate to severe spinal canal narrowing at L3/L4 with moderate bilateral neural foraminal narrowing.      Multifactorial severe spinal canal narrowing at L4/L5 with moderate bilateral neural foraminal narrowing, worse on the left.      No acute fracture or suspicious osseous lesion.         IMPRESSION:      No evidence of acute traumatic injury in the chest, abdomen, or pelvis.      Mild thickening of the colonic wall near the hepatic flexure and proximal transverse colon, possibly exaggerated by under distention, consider a mild colitis. Correlation with the patient's symptoms and laboratory values recommended.      Coronary atherosclerosis, right adrenal nodule, degenerative changes of the spine, and other findings as above.            I personally discussed this study with DELICIA HOLLOWAY on 4/13/2025 1:16 AM.            Workstation performed: [id number]         TRAUMA - CT head wo contrast   Final Result by Lazarus Winkler DO (04/13  0129)      Moderate to large left frontal scalp hematoma/laceration. No calvarial fracture or acute intracranial abnormality is seen.      Sinus disease as described, correlate to exclude acute sinusitis.      Other findings as above.            CT CERVICAL SPINE - WITHOUT CONTRAST      INDICATION:   TRAUMA. Fall, head trauma      COMPARISON:  None.      TECHNIQUE:  CT examination of the cervical spine was performed without intravenous contrast.  Contiguous axial images were obtained. Multiplanar 2D reformatted images were created from the source data.      Radiation dose length product (DLP) for this visit:  361.95 mGy-cm , 0 mGy-cm , 945.41 mGy-cm , 361.95 mGy-cm.  This examination, like all CT scans performed in the [facility], was performed utilizing techniques to minimize radiation    dose exposure, including the use of iterative reconstruction and automated exposure control.      IMAGE QUALITY:  Diagnostic.      FINDINGS:      ALIGNMENT: Spinal alignment appears maintained.      VERTEBRAE: No compression fracture. Fracture. Anterior plate and screw fusion of C5, C6, and C7. Lucency through the transverse processes of C4 and C6 on the left, suspicious for nondisplaced fracture(s).      DEGENERATIVE CHANGES: Intervertebral disc space narrowing at C3/C4 and C4/C5. Multilevel anterior, marginal, and uncovertebral osteophytosis.      PREVERTEBRAL AND PARASPINAL SOFT TISSUES: Grossly unremarkable      THORACIC INLET:  Please refer to the concurrent chest CT report for thoracic inlet findings.         IMPRESSION:      No compression fracture. Anterior plate and screw fusion of C5, C6, and C7. Lucency through the transverse processes of C4 and C6 on the left, suspicious for nondisplaced fracture(s).      Spinal alignment appears maintained. Recommend CTA of the neck to evaluate for vascular injury.            CT CHEST, ABDOMEN AND PELVIS WITH IV CONTRAST      CT THORACOLUMBAR SPINE WITHOUT CONTRAST       INDICATION: TRAUMA.      COMPARISON: None.      TECHNIQUE: CT examination of the chest, abdomen and pelvis was performed. Multiplanar 2D reformatted images were created from the source data.      This examination, like all CT scans performed in the [facility], was performed utilizing techniques to minimize radiation dose exposure, including the use of iterative reconstruction and automated exposure control. Radiation dose length    product (DLP) for this visit: 361.95 mGy-cm , 0 mGy-cm , 945.41 mGy-cm , 361.95 mGy-cm      IV Contrast: Was administered   Enteric Contrast: Not administered.      FINDINGS:      CHEST      LUNGS: Mild atelectasis noted dependently, lungs otherwise appear grossly clear.      PLEURA: Unremarkable.      HEART/GREAT VESSELS: Heart appears normal in size. Mild coronary atherosclerosis. Mild aortic atherosclerosis. No thoracic aortic aneurysm.      MEDIASTINUM AND DALI: Unremarkable.      CHEST WALL AND LOWER NECK: Unremarkable.      ABDOMEN      LIVER/BILIARY TREE: Unremarkable.      GALLBLADDER: No calcified gallstones. No pericholecystic inflammatory change.      SPLEEN: Unremarkable.      PANCREAS: Unremarkable.      ADRENAL GLANDS: 1 cm nodule in the right adrenal gland.      KIDNEYS/URETERS: Simple renal cyst(s). Otherwise unremarkable kidneys. No hydronephrosis.      STOMACH AND BOWEL: Mild thickening of the colonic wall near the hepatic flexure and proximal transverse colon, possibly exaggerated by under distention, consider a mild colitis. Otherwise grossly unremarkable. No evidence of bowel obstruction.      APPENDIX: Normal caliber appendix.      ABDOMINOPELVIC CAVITY: No ascites. No pneumoperitoneum. No lymphadenopathy.      VESSELS: Mild atherosclerosis, no aortic aneurysm      PELVIS      REPRODUCTIVE ORGANS: Uterus not well seen, suspect prior hysterectomy      URINARY BLADDER: Unremarkable.      ABDOMINAL WALL/INGUINAL REGIONS: Unremarkable.      BONES:  Multilevel degenerative changes of the spine.      Multifactorial mild spinal canal narrowing at L1/L2. Moderate bilateral neural foraminal narrowing.      Multifactorial mild spinal canal narrowing at L2/L3 with mild bilateral neural foraminal narrowing.      Multifactorial moderate to severe spinal canal narrowing at L3/L4 with moderate bilateral neural foraminal narrowing.      Multifactorial severe spinal canal narrowing at L4/L5 with moderate bilateral neural foraminal narrowing, worse on the left.      No acute fracture or suspicious osseous lesion.         IMPRESSION:      No evidence of acute traumatic injury in the chest, abdomen, or pelvis.      Mild thickening of the colonic wall near the hepatic flexure and proximal transverse colon, possibly exaggerated by under distention, consider a mild colitis. Correlation with the patient's symptoms and laboratory values recommended.      Coronary atherosclerosis, right adrenal nodule, degenerative changes of the spine, and other findings as above.            I personally discussed this study with HUNTER HOLLOWAY on 4/13/2025 1:16 AM.            Workstation performed: [id number]         XR Trauma chest portable   Final Result by Lazarus Winkler DO (04/13 0028)      No acute cardiopulmonary disease.      Other findings as above.      Correlation with pending trauma CTs recommended.      Workstation performed: [id number]               Procedures  ECG 12 Lead Documentation Only    Date/Time: 4/13/2025 12:47 AM    Performed by: Hunter Holloway DO  Authorized by: Hunter Holloway DO    Indications / Diagnosis:  Syncope  Patient location:  ED  Interpretation:     Interpretation: abnormal    Rate:     ECG rate:  86    ECG rate assessment: normal    Rhythm:     Rhythm: sinus rhythm    ST segments:     ST segments:  Non-specific    Depression:  V6, V5, V4 and V3  Universal Protocol:  Consent: Verbal consent obtained.  Risks and benefits: risks,  "benefits and alternatives were discussed  Consent given by: patient  Time out: Immediately prior to procedure a \"time out\" was called to verify the correct patient, procedure, equipment, support staff and site/side marked as required.  Timeout called at: 4/13/2025 2:45 AM.  Patient understanding: patient states understanding of the procedure being performed  Laceration repair    Date/Time: 4/13/2025 2:45 AM    Performed by: Hunter Eden DO  Authorized by: Hunter Eden DO  Body area: head/neck  Location details: scalp  Laceration length: 2 cm  Anesthesia: local infiltration    Anesthesia:  Local Anesthetic: lidocaine 1% with epinephrine  Anesthetic total: 8 mL    Wound Dehiscence:  Superficial Wound Dehiscence: simple closure      Procedure Details:  Preparation: Patient was prepped and draped in the usual sterile fashion.  Irrigation solution: sterile water.  Irrigation method: jet lavage  Amount of cleaning: standard  Debridement: none  Degree of undermining: none  Skin closure: 5-0 nylon  Fascia closure: 3-0 Vicryl  Number of sutures: 9  Approximation: close  Approximation difficulty: simple  Dressing: antibiotic ointment and 4x4 sterile gauze  Patient tolerance: patient tolerated the procedure well with no immediate complications  Comments: Small galeal defect 1.3cm closed with 2 3-0 vicryl  Skin closer with 7x 5-0 nylon               ED Course  ED Course as of 04/13/25 0430   Sun Apr 13, 2025   0008 Xrays viewed and interpreted independently by me: No acute disease.     0026 EKG shows nonspecific repolarization changes, no complains really consistent with ACS, but will send troponin   0033 Patient given Ativan, reassessed, he is on a slightly controlled   0033 MAGNESIUM(!): 1.7  Will give IV magnesium for now, then work on replenishing potassium.   0416 CT scan reassuring.  Troponin negative.  Patient states she is aware of her conduction delay from her prior EKG not in the system.  Will discuss " with your primary physician although cardiology referral placed.  Patient placed in a cervical spine brace for comfort, will follow-up with primary physician.  Prescription for oxycodone sent to pharmacy.   0428 TP fracture; cta ordered r/o vert artery dissection. No neurologic symptoms. Moderate pain, placed in c spine brace for comfort           Medical Decision Making  65-year-old female presents after a syncopal episode with fall and facial laceration.  Patient has a laceration to left forehead that will require repair. 1 alcoholic drink earlier in the evening, clinically she is not intoxicated.    Exam she is very anxious, tachypneic.  She is complaining of bilateral hand paresthesias.  Does have diffuse cervical spine tenderness and thoracic spine tenderness, will have to reassess paresthesias because if there is no resolution I would be concerned for central cord syndrome, but currently has normal strength and able to sense touch.    Ddx; syncope due to anemia, electrolyte abnormality, orthostatic, etoh likely contributing  Ddx: traumatic ICH, c spine fracture, thoracic fracture    Will give low dose of ativan and reassess.  Bedside CXR r/o pneumothorax  EFAST negative for free fluid    Td UTD        Problems Addressed:  Anxiety: self-limited or minor problem  Cervical transverse process fracture (HCC): acute illness or injury  Coronary artery calcification: undiagnosed new problem with uncertain prognosis  Hypokalemia: acute illness or injury  Hypomagnesemia: acute illness or injury  Laceration of forehead, initial encounter: complicated acute illness or injury  Neck pain: acute illness or injury  Nonspecific abnormal electrocardiogram (ECG) (EKG): undiagnosed new problem with uncertain prognosis  Syncope: acute illness or injury    Amount and/or Complexity of Data Reviewed  Labs: ordered. Decision-making details documented in ED Course.  Radiology: ordered and independent interpretation performed.  Decision-making details documented in ED Course.    Risk  OTC drugs.  Prescription drug management.  Parenteral controlled substances.  Decision regarding hospitalization.                Disposition  [facility] Transfer: No  Final diagnoses:   Syncope   Laceration of forehead, initial encounter   Anxiety   Neck pain   Hypomagnesemia   Hypokalemia   Cervical transverse process fracture (HCC)   Nonspecific abnormal electrocardiogram (ECG) (EKG)   Coronary artery calcification     Time reflects when diagnosis was documented in both MDM as applicable and the Disposition within this note       Time User Action Codes Description Comment    4/13/2025 12:48 AM Hunter Eden [R55] Syncope     4/13/2025 12:48 AM Hunter Eden [S01.81XA] Laceration of forehead, initial encounter     4/13/2025 12:48 AM Hunter Eden [F41.9] Anxiety     4/13/2025 12:48 AM Hunter Eden [M54.2] Neck pain     4/13/2025 12:48 AM Hunter Eden [E83.42] Hypomagnesemia     4/13/2025 12:48 AM Hunter Eden [E87.6] Hypokalemia     4/13/2025  3:44 AM Hunter Eden [S12.9XXA] Cervical transverse process fracture (HCC)     4/13/2025  3:45 AM Hunter Eden [R94.31] Nonspecific abnormal electrocardiogram (ECG) (EKG)     4/13/2025  4:21 AM Hunter Eden [I25.10] Coronary artery calcification           ED Disposition       ED Disposition   Discharge    Condition   Stable    Date/Time   Sun Apr 13, 2025  4:16 AM    Comment   Tati Higuera discharge to home/self care.                   Follow-up Information       Follow up With Specialties Details Why Contact Info Additional Information    Anabella Wade DO Family Medicine Schedule an appointment as soon as possible for a visit   [facility] [place] [address] [phone]       Lazarus Glasgow MD Orthopedic Surgery  As needed [address] [place] [address]  [address]  [facility] [address] [phone]       [facility] Schedule an appointment as soon as possible for a visit   [address] [address] [phone] [facility] [place] [address], [place], [place], [address] [phone]          Patient's Medications   Discharge Prescriptions    CEFUROXIME (CEFTIN) 500 MG TABLET    Take 1 tablet (500 mg total) by mouth every 12 (twelve) hours for 5 days       Start Date: 4/13/2025 End Date: 4/18/2025       Order Dose: 500 mg       Quantity: 10 tablet    Refills: 0    IBUPROFEN (MOTRIN) 600 MG TABLET    Take 1 tablet (600 mg total) by mouth every 6 (six) hours as needed for moderate pain or mild pain       Start Date: 4/13/2025 End Date: --       Order Dose: 600 mg       Quantity: 30 tablet    Refills: 0    OXYCODONE-ACETAMINOPHEN (PERCOCET) 5-325 MG PER TABLET    Take 1 tablet by mouth every 6 (six) hours as needed for moderate pain or severe pain for up to 4 days Max Daily Amount: 4 tablets       Start Date: 4/13/2025 End Date: 4/17/2025       Order Dose: 1 tablet       Quantity: 15 tablet    Refills: 0         PDMP Review         Value Time User    PDMP Reviewed  Yes 4/13/2025  4:11 AM Hunter Eden DO            ED Provider  Electronically Signed by           Hunter Eden DO  04/13/25 0430

## 2025-04-13 NOTE — DISCHARGE INSTRUCTIONS
For symptom for an episode of loss of consciousness also known as syncope.  Your overall evaluation regarding this is reassuring.  I recommend that you stand up slowly, and sit down immediately if you feel lightheaded.  Sit on the edge of the bed for a minute before going from a laying to standing position.  Drink plenty of fluids.    You had a laceration to your face which required sutures. These should be removed in approximately 5 to 7 days.  Apply Neosporin to the wound 2-4 times per day.  Return if you develop any symptoms of infection.  You have 2 sutures in a layer of muscle below your skin which will be absorbed over the next several weeks.  You will likely have bruising and swelling in this location which may even go down your face and cause a black eye.     I recommend that you follow-up with cardiology regarding your visit today.  A referral was placed.    Your CT scan demonstrated transverse process fracture of your cervical spine.  These are points of muscle attachments for your spine, and you will likely have pain when moving and turning your head.    Take 600 mg of ibuprofen if needed for pain as well as 650 mg of Tylenol.  A prescription for Percocet was sent to your pharmacy as well which should be used for more significant pain in place of tylenol.  Each Percocet has 325 mg Tylenol in it, do not exceed 3000 mg of Tylenol in a 24-hour period.  Call to schedule follow-up with your primary physician as soon as possible as the emergency department.

## 2025-04-15 ENCOUNTER — OFFICE VISIT (OUTPATIENT)
Age: 65
End: 2025-04-15
Attending: PHYSICAL THERAPIST
Payer: COMMERCIAL

## 2025-04-15 ENCOUNTER — NURSE TRIAGE (OUTPATIENT)
Dept: PHYSICAL THERAPY | Facility: OTHER | Age: 65
End: 2025-04-15

## 2025-04-15 VITALS — HEIGHT: 61 IN | WEIGHT: 134 LBS | BODY MASS INDEX: 25.3 KG/M2

## 2025-04-15 DIAGNOSIS — M54.2 ACUTE NECK PAIN: ICD-10-CM

## 2025-04-15 DIAGNOSIS — M54.2 ACUTE NECK PAIN: Primary | ICD-10-CM

## 2025-04-15 DIAGNOSIS — M54.2 NECK PAIN: Primary | ICD-10-CM

## 2025-04-15 PROCEDURE — 99204 OFFICE O/P NEW MOD 45 MIN: CPT | Performed by: ORTHOPAEDIC SURGERY
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
## 2025-04-15 NOTE — PROGRESS NOTES
"Name: Tati Higuera      : 1960       MRN: [id number]   Encounter Provider: Mishel Alvarez MD   Encounter Date: 04/15/25  Encounter department: [facility] ORTHOPEDIC SPINE SURGERY      History of Present Illness    Tati Higuera is a 65 y.o. female who presents for initial evaluation of her cervical spine.  She fell and was seen in the ER.  She was diagnosed with a fracture.  And was placed in a collar.  She has had a prior cervical fusion from C5-C7.  She continues to have neck pain and has been wearing an Aspen collar.      Diabetic: No  Nicotine use: Yes   BMI: Estimated body mass index is 25.32 kg/m² as calculated from the following:    Height as of 3/5/25: 5' 1\" (1.549 m).    Weight as of 25: 60.8 kg (134 lb).  Blood thinners: None      ALLERGIES: No Known Allergies    MEDICATIONS:    Current Outpatient Medications:     azelastine (ASTELIN) 0.1 % nasal spray, 1 spray into each nostril 2 (two) times a day Use in each nostril as directed, Disp: 30 mL, Rfl: 5    cefuroxime (CEFTIN) 500 mg tablet, Take 1 tablet (500 mg total) by mouth every 12 (twelve) hours for 5 days, Disp: 10 tablet, Rfl: 0    cyclobenzaprine (FLEXERIL) 10 mg tablet, Take 1 tablet (10 mg total) by mouth 3 (three) times a day as needed for muscle spasms, Disp: 90 tablet, Rfl: 5    cyclobenzaprine (FLEXERIL) 10 mg tablet, Take 1 tablet (10 mg total) by mouth 3 times a day as needed for muscle spasms, Disp: 270 tablet, Rfl: 3    desvenlafaxine succinate (PRISTIQ) 50 mg 24 hr tablet, Take 1 tablet (50 mg total) by mouth daily, Disp: 90 tablet, Rfl: 0    dexlansoprazole (DEXILANT) 60 MG capsule, Take 1 capsule (60 mg total) by mouth daily, Disp: 90 capsule, Rfl: 1    divalproex sodium (Depakote) 500 mg DR tablet, 2 tablets PO @ HS, Disp: 180 tablet, Rfl: 4    fluticasone (FLONASE) 50 mcg/act nasal spray, 1 spray into each nostril daily, Disp: 15.8 mL, Rfl: 11    " Fluticasone-Salmeterol (Advair Diskus) 250-50 mcg/dose inhaler, Inhale 1 puff 2 (two) times a day Rinse mouth after use. (Patient not taking: Reported on 3/5/2025), Disp: 60 blister, Rfl: 1    hydrOXYzine HCL (ATARAX) 25 mg tablet, TAKE (1) TABLET EVERY SIX HOURS AS NEEDED FORITCHING..., Disp: 30 tablet, Rfl: 2    ibuprofen (MOTRIN) 600 mg tablet, Take 1 tablet (600 mg total) by mouth every 6 (six) hours as needed for moderate pain or mild pain, Disp: 30 tablet, Rfl: 0    ondansetron (ZOFRAN-ODT) 4 mg disintegrating tablet, Take 1 tablet (4 mg total) by mouth every 6 (six) hours as needed for nausea or vomiting, Disp: 20 tablet, Rfl: 3    oxyCODONE-acetaminophen (Percocet) 5-325 mg per tablet, Take 1 tablet by mouth every 6 (six) hours as needed for moderate pain or severe pain for up to 4 days Max Daily Amount: 4 tablets, Disp: 15 tablet, Rfl: 0    Semaglutide-Weight Management (Wegovy) 2.4 MG/0.75ML, Inject 2.4 mg under the skin weekly, Disp: 9 mL, Rfl: 1    traMADol (Ultram) 50 mg tablet, Take 1 tablet (50 mg total) by mouth every 6 (six) hours as needed for moderate pain, Disp: 30 tablet, Rfl: 2    triamcinolone (KENALOG) 0.1 % cream, Apply topically 2 (two) times a day, Disp: 30 g, Rfl: 1     PAST MEDICAL HISTORY:   Past Medical History:   Diagnosis Date    Anxiety     Bipolar disorder (HCC)     Class 2 obesity due to excess calories without serious comorbidity with body mass index (BMI) of 35.0 to 35.9 in adult 08/02/2023    COVID 12/28/2023    Depression     GERD (gastroesophageal reflux disease)     Headache     after anesthesia    Headache(784.0)     History of meningitis     age 5    Hx of fusion of cervical spine 09/21/2023    Hyperlipidemia     Localized edema 12/15/2023    migraines     Nasal congestion 03/11/2024    Obesity     Primary hypertension 10/12/2023    Wears glasses        PAST SURGICAL HISTORY:  Past Surgical History:   Procedure Laterality Date    BREAST BIOPSY Right     benign    CYST  REMOVAL Right     hand    CYSTOSCOPY N/A 02/08/2017    Procedure: CYSTOSCOPY;  Surgeon: Sarath Suresh MD;  Location: [facility];  Service:     EXAMINATION UNDER ANESTHESIA N/A 02/08/2017    Procedure: EXAM UNDER ANESTHESIA, RESECTION OF VAGINAL GRAFT;  Surgeon: Sarath Suresh MD;  Location: [facility];  Service:     HYSTERECTOMY      AZ CYSTOURETHROSCOPY N/A 09/27/2017    Procedure: CYSTOSCOPY;  Surgeon: Sarath Suresh MD;  Location: [facility];  Service: UroGynecology           AZ REVJ/RMVL PROSTHETIC VAGINAL GRAFT VAGINAL JANI N/A 09/27/2017    Procedure: RESECTION OF EXPOSED MESH VAGINAL AND PARAVAGINAL TISSUE;  Surgeon: Sarath Suresh MD;  Location: [facility];  Service: UroGynecology           SPINAL FUSION  2015    C4-C5 and C5-C6    TONSILLECTOMY      TOTAL VAGINAL HYSTERECTOMY      TUBAL LIGATION         SOCIAL HISTORY:  Social History     Tobacco Use   Smoking Status Every Day    Current packs/day: 0.25    Average packs/day: 0.3 packs/day for 45.2 years (11.3 ttl pk-yrs)    Types: Cigarettes    Passive exposure: Current   Smokeless Tobacco Never        Physical Exam    65 y.o. female sitting comfortably on exam chair in no apparent distress.   Significant discomfort to soft touch in the cervical spine.  Unable to assess for muscle spasm.  Normal alignment.  Was fitted today.        RADIOGRAPHIC STUDIES:  MRI, cervical spine, 8/15/2023: Prior cervical fusion at C5-6 and C6-7.  The fusion appears to be solid.  There is evidence of severe degenerative changes at C3-4 and C4-5 with posterior disc protrusion, bilateral uncovertebral hypertrophy, mild central stenosis and moderate bilateral neuroforaminal stenosis.  CT, cervical spine, 4/13/2025: Prior cervical fusion at C5-6 and C6-7 solid, notes of hardware complication.  There is evidence of severe degenerative changes with anterior osteophytes at C4-5 and C3-4.  There is indication of possible osteophyte avulsion involving the inferior aspect of the C3 vertebral  "body.  Radiologist also noted \"Lucency through the transverse processes of C4 and C6 on the left, suspicious for nondisplaced fracture(s).\"  CT, thoracolumbar spine, 4/13/2025: No was a fracture.    Assessment & Plan  Acute neck pain    Orders:    Ambulatory referral to Orthopedic Surgery    Neck pain                PLAN:  65 y.o. female status post fall with neck pain.  She was seen in the ER and was placed in a collar.  There was questions regarding a fracture.  I did review the CT scan.  There is appears to be an avulsion fracture involving the anterior osteophyte.  Radiologist also pointed to a lucency involving the transverse process.  She did have significant issues with the collar which was refitted by our personnel.    I did discuss the treatment options.  The potential fracture seen in the cervical spine on the CT scan is stable.  There is no significant findings suggestive instability however ligamentous injury cannot be ruled out via CT scan.  I did attempt to clear her clinically but unfortunate given the magnitude of symptoms with soft touch, I could not fully assess for instability.  The safest way is to keep the collar on for 2 weeks.  I will see her back in 2 weeks.  Flexion-extension x-rays cervical spine will be obtained at that time.  If there is no abnormal motion on the flexion-extension x-rays, then the cervical collar can be discontinued    Patient has had chronic pain issues for a number of years and has had a prior fusion.  There is a number of medical comorbidities that also affect this assessment.    We did discuss the role of MRI.  Unfortunately MRI scan is not ideal for ruling out ligamentous instability and the fracture seen on CT scan would not be seen on MRI study.  At this time I do not see an indication for obtaining an MRI study.  Although unlikely, the most important step is to rule out ligamentous instability.    F/u in 2 weeks  Scribe Attestation      I,:   am acting as a scribe " while in the presence of the attending physician.:       I,:   personally performed the services described in this documentation    as scribed in my presence.:

## 2025-04-17 ENCOUNTER — OFFICE VISIT (OUTPATIENT)
Dept: FAMILY MEDICINE CLINIC | Facility: CLINIC | Age: 65
End: 2025-04-17
Payer: COMMERCIAL

## 2025-04-17 ENCOUNTER — CONSULT (OUTPATIENT)
Dept: PAIN MEDICINE | Facility: CLINIC | Age: 65
End: 2025-04-17
Payer: COMMERCIAL

## 2025-04-17 VITALS — WEIGHT: 127 LBS | BODY MASS INDEX: 23.98 KG/M2 | HEIGHT: 61 IN

## 2025-04-17 VITALS
HEART RATE: 74 BPM | BODY MASS INDEX: 24.35 KG/M2 | TEMPERATURE: 98.4 F | OXYGEN SATURATION: 98 % | DIASTOLIC BLOOD PRESSURE: 80 MMHG | HEIGHT: 61 IN | SYSTOLIC BLOOD PRESSURE: 138 MMHG | WEIGHT: 129 LBS

## 2025-04-17 DIAGNOSIS — G89.4 CHRONIC PAIN SYNDROME: ICD-10-CM

## 2025-04-17 DIAGNOSIS — Z98.1 S/P CERVICAL SPINAL FUSION: ICD-10-CM

## 2025-04-17 DIAGNOSIS — S12.9XXA CERVICAL TRANSVERSE PROCESS FRACTURE (HCC): ICD-10-CM

## 2025-04-17 DIAGNOSIS — M54.2 NECK PAIN: Primary | ICD-10-CM
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
## 2025-04-17 PROCEDURE — 99213 OFFICE O/P EST LOW 20 MIN: CPT | Performed by: NURSE PRACTITIONER

## 2025-04-17 PROCEDURE — 99204 OFFICE O/P NEW MOD 45 MIN: CPT | Performed by: STUDENT IN AN ORGANIZED HEALTH CARE EDUCATION/TRAINING PROGRAM

## 2025-04-17 RX ORDER — OXYCODONE AND ACETAMINOPHEN 5; 325 MG/1; MG/1
1 TABLET ORAL EVERY 6 HOURS PRN
Qty: 15 TABLET | Refills: 0 | Status: SHIPPED | OUTPATIENT
Start: 2025-04-17 | End: 2025-04-21

## 2025-04-17 NOTE — PROGRESS NOTES
"Assessment:  1. Neck pain    2. S/P cervical spinal fusion    3. Chronic pain syndrome    4. Fall, sequela        Portions of the record may have been created with voice recognition software. Occasional wrong word or \"sound a like\" substitutions may have occurred due to the inherent limitations of voice recognition software. Read the chart carefully and recognize, using context, where substitutions have occurred. Contact me with any questions.       Plan:  64-year-old female with past history of cervical spine fusion, here for initial evaluation of acute on chronic neck pain with radiation into left greater than right UE and nondermatomal distribution since a recent fall.  She also notes paresthesias over BUE, particularly left D1.  Notes symptom limited difficulty with upper extremity function, denies progressive weakness, balance or gait issues.  She presented to the ER after the fall and extensive head and neck imaging was performed.  Patient has a left frontal scalp hematoma/laceration.  She was placed in a cervical collar as a precaution and advised to follow-up with orthopedic spine surgery as an outpatient.  Patient was evaluated by Dr. Alvarez on 4/15/2025 and was advised to continue C-collar for 2 weeks.  Patient was provided a short course of Percocet 5 mg as needed by ER provider for symptom management.  Patient notes symptoms are gradually improving at this time.      No role for interventional treatment at this time.    Patient notes dissatisfaction with orthopedic evaluation, requesting referral to neurosurgery for further evaluation.  Referral placed.    Follow-up in 4 to 6 weeks or as needed.        History of Present Illness:    The patient is a 65 y.o. female who presents for consultation in regards to Neck Pain, Shoulder Pain, Hand Pain, Arm Pain (left), and head (numbness).  Symptoms have been present since 4/12/25. Symptoms began after a syncopal episode. Pain is reported to be 7 on the numeric " rating scale.  Symptoms are felt nearly constantly and worst in the no typical pattern.  Symptoms are characterized as burning, shooting, sharp, and numbing.  Symptoms are associated with pain limited difficulty with UE function.  Aggravating factors include lying down, bending, and coughing/sneezing.  Relieving factors include nothing.        Review of Systems:    Review of Systems   Constitutional:  Negative for chills and fever.   HENT:  Negative for ear pain, sinus pressure and sore throat.    Eyes:  Negative for pain and redness.   Respiratory:  Negative for cough and wheezing.    Cardiovascular:  Negative for leg swelling.   Gastrointestinal:  Negative for abdominal pain and nausea.   Endocrine: Negative for polydipsia and polyuria.   Genitourinary:  Negative for difficulty urinating and frequency.   Musculoskeletal:  Positive for arthralgias, neck pain and neck stiffness. Negative for gait problem and myalgias.   Skin:  Negative for pallor and wound.   Neurological:  Positive for numbness. Negative for seizures, weakness and headaches.   Psychiatric/Behavioral:  Negative for decreased concentration and sleep disturbance.            Past Medical History:   Diagnosis Date    Anxiety     Bipolar disorder (HCC)     Class 2 obesity due to excess calories without serious comorbidity with body mass index (BMI) of 35.0 to 35.9 in adult 08/02/2023    COVID 12/28/2023    Depression     GERD (gastroesophageal reflux disease)     Headache     after anesthesia    Headache(784.0)     History of meningitis     age 5    Hx of fusion of cervical spine 09/21/2023    Hyperlipidemia     Localized edema 12/15/2023    migraines     Nasal congestion 03/11/2024    Obesity     Primary hypertension 10/12/2023    Wears glasses        Past Surgical History:   Procedure Laterality Date    BREAST BIOPSY Right     benign    CYST REMOVAL Right     hand    CYSTOSCOPY N/A 02/08/2017    Procedure: CYSTOSCOPY;  Surgeon: Sarath Suresh MD;   Location: [facility];  Service:     EXAMINATION UNDER ANESTHESIA N/A 02/08/2017    Procedure: EXAM UNDER ANESTHESIA, RESECTION OF VAGINAL GRAFT;  Surgeon: Sarath Suresh MD;  Location: [facility];  Service:     HYSTERECTOMY      CT CYSTOURETHROSCOPY N/A 09/27/2017    Procedure: CYSTOSCOPY;  Surgeon: Sarath Suresh MD;  Location: [facility];  Service: UroGynecology           CT REVJ/RMVL PROSTHETIC VAGINAL GRAFT VAGINAL JANI N/A 09/27/2017    Procedure: RESECTION OF EXPOSED MESH VAGINAL AND PARAVAGINAL TISSUE;  Surgeon: Sarath Suresh MD;  Location: [facility];  Service: UroGynecology           SPINAL FUSION  2015    C4-C5 and C5-C6    TONSILLECTOMY      TOTAL VAGINAL HYSTERECTOMY      TUBAL LIGATION         Family History   Problem Relation Age of Onset    Pseudochol deficiency Mother     COPD Mother         Pseudomonas of the lungs    Depression Mother     Alcohol abuse Father     Cancer Father     No Known Problems Sister     No Known Problems Daughter     No Known Problems Maternal Grandmother     No Known Problems Paternal Grandmother     No Known Problems Paternal Aunt     No Known Problems Paternal Aunt     No Known Problems Paternal Aunt        Social History     Occupational History    Not on file   Tobacco Use    Smoking status: Every Day     Current packs/day: 0.25     Average packs/day: 0.3 packs/day for 45.2 years (11.3 ttl pk-yrs)     Types: Cigarettes     Passive exposure: Current    Smokeless tobacco: Never   Vaping Use    Vaping status: Every Day    Substances: THC   Substance and Sexual Activity    Alcohol use: Yes     Alcohol/week: 5.0 standard drinks of alcohol     Types: 5 Glasses of wine per week     Comment: socially    Drug use: Yes     Frequency: 7.0 times per week     Types: Marijuana     Comment: Medical Marijuana    Sexual activity: Yes     Partners: Male     Birth control/protection: Female Sterilization         Current Outpatient Medications:     azelastine (ASTELIN) 0.1 % nasal spray, 1  spray into each nostril 2 (two) times a day Use in each nostril as directed, Disp: 30 mL, Rfl: 5    cefuroxime (CEFTIN) 500 mg tablet, Take 1 tablet (500 mg total) by mouth every 12 (twelve) hours for 5 days, Disp: 10 tablet, Rfl: 0    cyclobenzaprine (FLEXERIL) 10 mg tablet, Take 1 tablet (10 mg total) by mouth 3 times a day as needed for muscle spasms, Disp: 270 tablet, Rfl: 3    desvenlafaxine succinate (PRISTIQ) 50 mg 24 hr tablet, Take 1 tablet (50 mg total) by mouth daily, Disp: 90 tablet, Rfl: 0    dexlansoprazole (DEXILANT) 60 MG capsule, Take 1 capsule (60 mg total) by mouth daily, Disp: 90 capsule, Rfl: 1    divalproex sodium (Depakote) 500 mg DR tablet, 2 tablets PO @ HS, Disp: 180 tablet, Rfl: 4    fluticasone (FLONASE) 50 mcg/act nasal spray, 1 spray into each nostril daily, Disp: 15.8 mL, Rfl: 11    hydrOXYzine HCL (ATARAX) 25 mg tablet, TAKE (1) TABLET EVERY SIX HOURS AS NEEDED FORITCHING..., Disp: 30 tablet, Rfl: 2    ibuprofen (MOTRIN) 600 mg tablet, Take 1 tablet (600 mg total) by mouth every 6 (six) hours as needed for moderate pain or mild pain, Disp: 30 tablet, Rfl: 0    ondansetron (ZOFRAN-ODT) 4 mg disintegrating tablet, Take 1 tablet (4 mg total) by mouth every 6 (six) hours as needed for nausea or vomiting, Disp: 20 tablet, Rfl: 3    oxyCODONE-acetaminophen (Percocet) 5-325 mg per tablet, Take 1 tablet by mouth every 6 (six) hours as needed for moderate pain or severe pain for up to 4 days Max Daily Amount: 4 tablets, Disp: 15 tablet, Rfl: 0    Semaglutide-Weight Management (Wegovy) 2.4 MG/0.75ML, Inject 2.4 mg under the skin weekly, Disp: 9 mL, Rfl: 1    triamcinolone (KENALOG) 0.1 % cream, Apply topically 2 (two) times a day, Disp: 30 g, Rfl: 1    cyclobenzaprine (FLEXERIL) 10 mg tablet, Take 1 tablet (10 mg total) by mouth 3 (three) times a day as needed for muscle spasms (Patient not taking: Reported on 4/17/2025), Disp: 90 tablet, Rfl: 5    Fluticasone-Salmeterol (Advair Diskus) 250-50  "mcg/dose inhaler, Inhale 1 puff 2 (two) times a day Rinse mouth after use. (Patient not taking: Reported on 2/25/2025), Disp: 60 blister, Rfl: 1    traMADol (Ultram) 50 mg tablet, Take 1 tablet (50 mg total) by mouth every 6 (six) hours as needed for moderate pain (Patient not taking: Reported on 4/17/2025), Disp: 30 tablet, Rfl: 2    No Known Allergies    Physical Exam:    Ht 5' 1\" (1.549 m)   Wt 57.6 kg (127 lb)   BMI 24.00 kg/m²     Constitutional: normal, well developed, well nourished, alert, in no distress and non-toxic and no overt pain behavior.  Eyes: anicteric  HEENT: grossly intact  Neck: supple, symmetric, trachea midline and no masses   Pulmonary:even and unlabored  Cardiovascular:No edema or pitting edema present  Skin:Normal without rashes or lesions and well hydrated  Psychiatric:Mood and affect appropriate  Neurologic:Cranial Nerves II-XII grossly intact  Musculoskeletal:normal gait, cervical collar in place, grossly intact strength and sensation to light touch over BUE, negative benson's      Imaging         Study Result    Narrative & Impression   CTA NECK AND BRAIN WITH AND WITHOUT CONTRAST     INDICATION: tp fracture     COMPARISON:   CT cervical spine dated 4/12/2025     TECHNIQUE:  Routine CT imaging of the Brain without contrast.Post contrast imaging was performed after administration of iodinated contrast through the neck and brain. Post contrast axial 0.625 mm images timed to opacify the arterial system.  3D   rendering was performed on an independent workstation.   MIP reconstructions performed. Coronal and sagittal reconstructions were performed of the non contrast portion of the brain.     Radiation dose length product (DLP) for this visit:  393.66 mGy-cm .  This examination, like all CT scans performed in the [facility], was performed utilizing techniques to minimize radiation dose exposure, including the use of iterative   reconstruction and automated exposure " control.     IV Contrast:  75 mL of iohexol     IMAGE QUALITY:   Diagnostic     FINDINGS:     CTA NECK     ARCH AND GREAT VESSELS: Mild atherosclerotic changes with no severe stenosis.  VERTEBRAL ARTERIES: Patent extracranial segments.  RIGHT CAROTID: Less than 50% stenosis.    No dissection.  LEFT CAROTID: Less than 50% stenosis.    No dissection.  NASCET criteria was used to determine the degree of internal carotid artery diameter stenosis.        CTA BRAIN:  INTERNAL CAROTID ARTERIES: Mild atherosclerosis. No stenosis or occlusion.  ANTERIOR CEREBRAL ARTERY CIRCULATION:  No stenosis or occlusion.  MIDDLE CEREBRAL ARTERY CIRCULATION:  No stenosis or occlusion.  DISTAL VERTEBRAL ARTERIES:  No stenosis or occlusion.  BASILAR ARTERY:  No stenosis or occlusion.  POSTERIOR CEREBRAL ARTERIES: No stenosis or occlusion.  VENOUS STRUCTURES: Patent     NON VASCULAR ANATOMY  BONY STRUCTURES: Better evaluated on CT of the cervical spine recently obtained     SOFT TISSUES OF THE NECK: Grossly unremarkable     THORACIC INLET: Grossly unremarkable        IMPRESSION:     No evidence of acute vascular injury, occlusion, severe stenosis or aneurysm of the major arteries of the head and neck.     No significant stenosis within either internal carotid artery. Less than 50% stenosis by NASCET criteria.     Patent bilateral vertebral arteries.     Other findings as above.        Study Result    Narrative & Impression   CT BRAIN - WITHOUT CONTRAST     INDICATION:   TRAUMA. Fall, head trauma     COMPARISON:  None.     TECHNIQUE:  CT examination of the brain was performed.  Multiplanar 2D reformatted images were created from the source data.     Radiation dose length product (DLP) for this visit:  361.95 mGy-cm Spinal alignment appears maintained., 0 mGy-cm , 945.41 mGy-cm , 361.95 mGy-cm.  This examination, like all CT scans performed in the [facility], was performed utilizing   techniques to minimize radiation dose  exposure, including the use of iterative reconstruction and automated exposure control.     IMAGE QUALITY:  Diagnostic.     FINDINGS:     PARENCHYMA: Decreased attenuation is noted in periventricular and subcortical white matter demonstrating an appearance that is statistically most likely to represent mild microangiopathic change. Gray-white differentiation appears maintained.     No CT signs of acute territorial infarction.  No intracranial mass, mass effect or midline shift.  No acute parenchymal hemorrhage.     VENTRICLES AND EXTRA-AXIAL SPACES:  Normal for the patient's age.     VISUALIZED ORBITS:  Normal visualized orbits.     PARANASAL SINUSES:  Small amount of fluid dependently in the right maxillary sinus with some inspissated secretions, correlate to exclude acute sinusitis. Opacification of several bilateral ethmoid air cells. Visualized paranasal sinuses otherwise grossly clear     CALVARIUM AND EXTRACRANIAL SOFT TISSUES:  Moderate to large left frontal scalp hematoma/laceration. Calvarium appears intact.        IMPRESSION:     Moderate to large left frontal scalp hematoma/laceration. No calvarial fracture or acute intracranial abnormality is seen.     Sinus disease as described, correlate to exclude acute sinusitis.     Other findings as above.           CT CERVICAL SPINE - WITHOUT CONTRAST     INDICATION:   TRAUMA. Fall, head trauma     COMPARISON:  None.     TECHNIQUE:  CT examination of the cervical spine was performed without intravenous contrast.  Contiguous axial images were obtained. Multiplanar 2D reformatted images were created from the source data.     Radiation dose length product (DLP) for this visit:  361.95 mGy-cm , 0 mGy-cm , 945.41 mGy-cm , 361.95 mGy-cm.  This examination, like all CT scans performed in the [facility], was performed utilizing techniques to minimize radiation   dose exposure, including the use of iterative reconstruction and automated exposure control.      IMAGE QUALITY:  Diagnostic.     FINDINGS:     ALIGNMENT: Spinal alignment appears maintained.     VERTEBRAE: No compression fracture. Fracture. Anterior plate and screw fusion of C5, C6, and C7. Lucency through the transverse processes of C4 and C6 on the left, suspicious for nondisplaced fracture(s).     DEGENERATIVE CHANGES: Intervertebral disc space narrowing at C3/C4 and C4/C5. Multilevel anterior, marginal, and uncovertebral osteophytosis.     PREVERTEBRAL AND PARASPINAL SOFT TISSUES: Grossly unremarkable     THORACIC INLET:  Please refer to the concurrent chest CT report for thoracic inlet findings.        IMPRESSION:     No compression fracture. Anterior plate and screw fusion of C5, C6, and C7. Lucency through the transverse processes of C4 and C6 on the left, suspicious for nondisplaced fracture(s).     Spinal alignment appears maintained. Recommend CTA of the neck to evaluate for vascular injury.           CT CHEST, ABDOMEN AND PELVIS WITH IV CONTRAST     CT THORACOLUMBAR SPINE WITHOUT CONTRAST     INDICATION: TRAUMA.     COMPARISON: None.     TECHNIQUE: CT examination of the chest, abdomen and pelvis was performed. Multiplanar 2D reformatted images were created from the source data.     This examination, like [facility] CT [facility], was performed utilizing techniques to minimize radiation dose exposure, including the use of iterative reconstruction and automated exposure control. Radiation dose length   product (DLP) for this visit: 361.95 mGy-cm , 0 mGy-cm , 945.41 mGy-cm , 361.95 mGy-cm     IV Contrast: Was administered  Enteric Contrast: Not administered.     FINDINGS:     CHEST     LUNGS: Mild atelectasis noted dependently, lungs otherwise appear grossly clear.     PLEURA: Unremarkable.     HEART/GREAT VESSELS: Heart appears normal in size. Mild coronary atherosclerosis. Mild aortic atherosclerosis. No thoracic aortic aneurysm.     MEDIASTINUM AND DALI:  Unremarkable.     CHEST WALL AND LOWER NECK: Unremarkable.     ABDOMEN     LIVER/BILIARY TREE: Unremarkable.     GALLBLADDER: No calcified gallstones. No pericholecystic inflammatory change.     SPLEEN: Unremarkable.     PANCREAS: Unremarkable.     ADRENAL GLANDS: 1 cm nodule in the right adrenal gland.     KIDNEYS/URETERS: Simple renal cyst(s). Otherwise unremarkable kidneys. No hydronephrosis.     STOMACH AND BOWEL: Mild thickening of the colonic wall near the hepatic flexure and proximal transverse colon, possibly exaggerated by under distention, consider a mild colitis. Otherwise grossly unremarkable. No evidence of bowel obstruction.     APPENDIX: Normal caliber appendix.     ABDOMINOPELVIC CAVITY: No ascites. No pneumoperitoneum. No lymphadenopathy.     VESSELS: Mild atherosclerosis, no aortic aneurysm     PELVIS     REPRODUCTIVE ORGANS: Uterus not well seen, suspect prior hysterectomy     URINARY BLADDER: Unremarkable.     ABDOMINAL WALL/INGUINAL REGIONS: Unremarkable.     BONES: Multilevel degenerative changes of the spine.     Multifactorial mild spinal canal narrowing at L1/L2. Moderate bilateral neural foraminal narrowing.     Multifactorial mild spinal canal narrowing at L2/L3 with mild bilateral neural foraminal narrowing.     Multifactorial moderate to severe spinal canal narrowing at L3/L4 with moderate bilateral neural foraminal narrowing.     Multifactorial severe spinal canal narrowing at L4/L5 with moderate bilateral neural foraminal narrowing, worse on the left.     No acute fracture or suspicious osseous lesion.        IMPRESSION:     No evidence of acute traumatic injury in the chest, abdomen, or pelvis.     Mild thickening of the colonic wall near the hepatic flexure and proximal transverse colon, possibly exaggerated by under distention, consider a mild colitis. Correlation with the patient's symptoms and laboratory values recommended.     Coronary atherosclerosis, right adrenal nodule,  degenerative changes of the spine, and other findings as above.        Study Result    Narrative & Impression   CT BRAIN - WITHOUT CONTRAST     INDICATION:   TRAUMA. Fall, head trauma     COMPARISON:  None.     TECHNIQUE:  CT examination of the brain was performed.  Multiplanar 2D reformatted images were created from the source data.     Radiation dose length product (DLP) for this visit:  361.95 mGy-cm Spinal alignment appears maintained., 0 mGy-cm , 945.41 mGy-cm , 361.95 mGy-cm.  This examination, like all CT scans performed in the [facility], was performed utilizing   techniques to minimize radiation dose exposure, including the use of iterative reconstruction and automated exposure control.     IMAGE QUALITY:  Diagnostic.     FINDINGS:     PARENCHYMA: Decreased attenuation is noted in periventricular and subcortical white matter demonstrating an appearance that is statistically most likely to represent mild microangiopathic change. Gray-white differentiation appears maintained.     No CT signs of acute territorial infarction.  No intracranial mass, mass effect or midline shift.  No acute parenchymal hemorrhage.     VENTRICLES AND EXTRA-AXIAL SPACES:  Normal for the patient's age.     VISUALIZED ORBITS:  Normal visualized orbits.     PARANASAL SINUSES:  Small amount of fluid dependently in the right maxillary sinus with some inspissated secretions, correlate to exclude acute sinusitis. Opacification of several bilateral ethmoid air cells. Visualized paranasal sinuses otherwise grossly clear     CALVARIUM AND EXTRACRANIAL SOFT TISSUES:  Moderate to large left frontal scalp hematoma/laceration. Calvarium appears intact.        IMPRESSION:     Moderate to large left frontal scalp hematoma/laceration. No calvarial fracture or acute intracranial abnormality is seen.     Sinus disease as described, correlate to exclude acute sinusitis.     Other findings as above.           CT CERVICAL SPINE - WITHOUT  CONTRAST     INDICATION:   TRAUMA. Fall, head trauma     COMPARISON:  None.     TECHNIQUE:  CT examination of the cervical spine was performed without intravenous contrast.  Contiguous axial images were obtained. Multiplanar 2D reformatted images were created from the source data.     Radiation dose length product (DLP) for this visit:  361.95 mGy-cm , 0 mGy-cm , 945.41 mGy-cm , 361.95 mGy-cm.  This examination, like all CT scans performed in the [facility], was performed utilizing techniques to minimize radiation   dose exposure, including the use of iterative reconstruction and automated exposure control.     IMAGE QUALITY:  Diagnostic.     FINDINGS:     ALIGNMENT: Spinal alignment appears maintained.     VERTEBRAE: No compression fracture. Fracture. Anterior plate and screw fusion of C5, C6, and C7. Lucency through the transverse processes of C4 and C6 on the left, suspicious for nondisplaced fracture(s).     DEGENERATIVE CHANGES: Intervertebral disc space narrowing at C3/C4 and C4/C5. Multilevel anterior, marginal, and uncovertebral osteophytosis.     PREVERTEBRAL AND PARASPINAL SOFT TISSUES: Grossly unremarkable     THORACIC INLET:  Please refer to the concurrent chest CT report for thoracic inlet findings.        IMPRESSION:     No compression fracture. Anterior plate and screw fusion of C5, C6, and C7. Lucency through the transverse processes of C4 and C6 on the left, suspicious for nondisplaced fracture(s).     Spinal alignment appears maintained. Recommend CTA of the neck to evaluate for vascular injury.           CT CHEST, ABDOMEN AND PELVIS WITH IV CONTRAST     CT THORACOLUMBAR SPINE WITHOUT CONTRAST     INDICATION: TRAUMA.     COMPARISON: None.     TECHNIQUE: CT examination of the chest, abdomen and pelvis was performed. Multiplanar 2D reformatted images were created from the source data.     This examination, like all CT scans performed in the [facility], was performed  utilizing techniques to minimize radiation dose exposure, including the use of iterative reconstruction and automated exposure control. Radiation dose length   product (DLP) for this visit: 361.95 mGy-cm , 0 mGy-cm , 945.41 mGy-cm , 361.95 mGy-cm     IV Contrast: Was administered  Enteric Contrast: Not administered.     FINDINGS:     CHEST     LUNGS: Mild atelectasis noted dependently, lungs otherwise appear grossly clear.     PLEURA: Unremarkable.     HEART/GREAT VESSELS: Heart appears normal in size. Mild coronary atherosclerosis. Mild aortic atherosclerosis. No thoracic aortic aneurysm.     MEDIASTINUM AND DALI: Unremarkable.     CHEST WALL AND LOWER NECK: Unremarkable.     ABDOMEN     LIVER/BILIARY TREE: Unremarkable.     GALLBLADDER: No calcified gallstones. No pericholecystic inflammatory change.     SPLEEN: Unremarkable.     PANCREAS: Unremarkable.     ADRENAL GLANDS: 1 cm nodule in the right adrenal gland.     KIDNEYS/URETERS: Simple renal cyst(s). Otherwise unremarkable kidneys. No hydronephrosis.     STOMACH AND BOWEL: Mild thickening of the colonic wall near the hepatic flexure and proximal transverse colon, possibly exaggerated by under distention, consider a mild colitis. Otherwise grossly unremarkable. No evidence of bowel obstruction.     APPENDIX: Normal caliber appendix.     ABDOMINOPELVIC CAVITY: No ascites. No pneumoperitoneum. No lymphadenopathy.     VESSELS: Mild atherosclerosis, no aortic aneurysm     PELVIS     REPRODUCTIVE ORGANS: Uterus not well seen, suspect prior hysterectomy     URINARY BLADDER: Unremarkable.     ABDOMINAL WALL/INGUINAL REGIONS: Unremarkable.     BONES: Multilevel degenerative changes of the spine.     Multifactorial mild spinal canal narrowing at L1/L2. Moderate bilateral neural foraminal narrowing.     Multifactorial mild spinal canal narrowing at L2/L3 with mild bilateral neural foraminal narrowing.     Multifactorial moderate to severe spinal canal narrowing at L3/L4  with moderate bilateral neural foraminal narrowing.     Multifactorial severe spinal canal narrowing at L4/L5 with moderate bilateral neural foraminal narrowing, worse on the left.     No acute fracture or suspicious osseous lesion.        IMPRESSION:     No evidence of acute traumatic injury in the chest, abdomen, or pelvis.     Mild thickening of the colonic wall near the hepatic flexure and proximal transverse colon, possibly exaggerated by under distention, consider a mild colitis. Correlation with the patient's symptoms and laboratory values recommended.     Coronary atherosclerosis, right adrenal nodule, degenerative changes of the spine, and other findings as above.     LEFT SHOULDER     INDICATION:   Pain in left shoulder.      COMPARISON:  None.     VIEWS:  XR SHOULDER 2+ VW LEFT      FINDINGS:     There is no acute fracture or dislocation.     No significant degenerative changes of the glenohumeral joint. There is an osteophyte at the distal clavicle. Acromioclavicular joint is aligned however.     No lytic or blastic osseous lesion.     Soft tissues are unremarkable.     IMPRESSION:        No acute osseous abnormality of the glenohumeral joint.  Mild acromioclavicular arthrosis with normal joint space but marginal osteophyte formation.

## 2025-04-17 NOTE — PROGRESS NOTES
"Name: Tati Higuera      : 1960      MRN: [id number]  Encounter Provider: HEIDE Smith  Encounter Date: 2025   Encounter department: [facility] PRIMARY C[facility]  :  Assessment & Plan  Cervical transverse process fracture (HCC)  She did ask for a few more Percocet.  We agreed I will give her 15 more but she should definitely not require more than that.  I do recommend that she use her Flexeril which she has for something else 3 times daily for the next 48 hours and see if we can loosen up that muscle spasm in her neck and then carefully add the Percocet if needed.  Orders:  •  oxyCODONE-acetaminophen (Percocet) 5-325 mg per tablet; Take 1 tablet by mouth every 6 (six) hours as needed for moderate pain or severe pain for up to 4 days Max Daily Amount: 4 tablets           History of Present Illness   For suture removal.  She fell 5 days ago lost her balance hit her head was not loss of consciousness.  She had had a couple of alcohol alcoholic drinks prior.  She was evaluated in the ER she did have transverse cervical fracture C4 and C6.  She saw Ortho but she was in such discomfort that she was very uncomfortable with exam.  She was upset with the provider because he told her he could not examine for her and he which she would need to keep the cervical collar on for 2 more weeks and see her back then.  So she is out of work until he clears her and must and cannot get the collar off until then.  He has had sensation of burning on her neck and chest area.  The CT had also showed some degenerative changes but no clear evidence of impingement.      Review of Systems    Objective   /80 (BP Location: Right arm, Patient Position: Sitting, Cuff Size: Standard)   Pulse 74   Temp 98.4 °F (36.9 °C) (Tympanic)   Ht 5' 1\" (1.549 m)   Wt 58.5 kg (129 lb)   SpO2 98%   BMI 24.37 kg/m²      Physical Exam  Musculoskeletal:      Comments: I can reach underneath the collar and feel 1 tender " area in her lower cervical spine.  Was exquisitely tender and I think a lot of it is soft tissue.  Some muscle spasm  Hand grasp are strong and equal   Skin:     Comments: 9 sutures removed without difficulty.   Neurological:      Mental Status: She is alert and oriented to person, place, and time.   Psychiatric:         Behavior: Behavior normal.         Thought Content: Thought content normal.         Judgment: Judgment normal.

## 2025-05-01 ENCOUNTER — OFFICE VISIT (OUTPATIENT)
Dept: FAMILY MEDICINE CLINIC | Facility: CLINIC | Age: 65
End: 2025-05-01
Payer: COMMERCIAL

## 2025-05-01 VITALS
WEIGHT: 123 LBS | HEIGHT: 61 IN | HEART RATE: 85 BPM | OXYGEN SATURATION: 97 % | DIASTOLIC BLOOD PRESSURE: 78 MMHG | SYSTOLIC BLOOD PRESSURE: 110 MMHG | BODY MASS INDEX: 23.22 KG/M2

## 2025-05-01 DIAGNOSIS — L03.316 NAVEL CELLULITIS: Primary | ICD-10-CM

## 2025-05-01 PROCEDURE — 99213 OFFICE O/P EST LOW 20 MIN: CPT | Performed by: NURSE PRACTITIONER

## 2025-05-01 RX ORDER — CEPHALEXIN 500 MG/1
500 CAPSULE ORAL 3 TIMES DAILY
Qty: 21 CAPSULE | Refills: 0 | Status: SHIPPED | OUTPATIENT
Start: 2025-05-01 | End: 2025-05-08

## 2025-05-01 NOTE — PROGRESS NOTES
"Name: Tati Higuera      : 1960      MRN: [id number]  Encounter Provider: HEIDE Smith  Encounter Date: 2025   Encounter department: [facility] PRIMARY CARE  :  Assessment & Plan  Navel cellulitis  There is some underlying irritation I think this may have started as just a little bit of intertrigo.  Please stop all topicals and the umbilicus.  Even though I do not visualize cellulitis outside with the tenderness moving down and into her abdomen I am just going to treat her a few days with Keflex.  I think it should all resolve but if she is left with an irritant dermatitis I want her to try Lotrimin over-the-counter and then follow-up if this does not work              History of Present Illness   Started Sore in navel, felt ulcer inside, applied alcohol ,bacitracin. Now becoming sore below      Review of Systems    Objective   /78 (BP Location: Left arm, Patient Position: Sitting, Cuff Size: Adult)   Pulse 85   Ht 5' 1\" (1.549 m)   Wt 55.8 kg (123 lb)   SpO2 97%   BMI 23.24 kg/m²      Physical Exam  Abdominal:          Comments: There is 1/2 cm ulceration inside the umbilicus about 2 cm below it is tender but it is not hyperemic and there is no palpable mass.   Neurological:      Mental Status: She is alert.         "

## 2025-05-23 ENCOUNTER — OFFICE VISIT (OUTPATIENT)
Dept: PAIN MEDICINE | Facility: CLINIC | Age: 65
End: 2025-05-23

## 2025-05-23 VITALS — WEIGHT: 118 LBS | HEIGHT: 61 IN | BODY MASS INDEX: 22.28 KG/M2

## 2025-05-23 DIAGNOSIS — G89.4 CHRONIC PAIN SYNDROME: Primary | ICD-10-CM

## 2025-05-23 DIAGNOSIS — M54.2 NECK PAIN: ICD-10-CM

## 2025-05-23 DIAGNOSIS — Z98.1 S/P CERVICAL SPINAL FUSION: ICD-10-CM

## 2025-05-23 NOTE — PROGRESS NOTES
"Name: Tati Higuera      : 1960      MRN: [id number]  Encounter Provider: Luz Vieira MD  Encounter Date: 2025   Encounter department: [facility] [place]  :    Portions of the record may have been created with voice recognition software. Occasional wrong word or \"sound a like\" substitutions may have occurred due to the inherent limitations of voice recognition software. Read the chart carefully and recognize, using context, where substitutions have occurred. Contact me with any questions.       Assessment & Plan  Chronic pain syndrome  S/P cervical spinal fusion  Neck pain    65-year-old female here for a follow-up visit with regards to chronic neck pain symptoms with radiation into left upper extremity.  She does report significant improvement in pain symptoms since exacerbation after recent fall.  Since last visit, she was evaluated by orthopedic surgery, Dr. Hernandez, and was cleared from cervical collar.  She was advised to start physical therapy which patient has not yet scheduled.  She notes pain limited difficulty with function but denies new or progressive weakness.    Discussed option of obtaining cervical spine MRI for further evaluation of ongoing neck and LUE pain symptoms and for consideration of interventional treatment.  Patient defers at this time, notes preference for conservative treatment.    Agree with course of physical therapy for neck strengthening exercises, optimizing pain-free ROM.    Follow-up in 4 to 6 weeks or as needed.             My impressions and treatment recommendations were discussed in detail with the patient who verbalized understanding and had no further questions.  Discharge instructions were provided. I personally saw and examined the patient and I agree with the above discussed plan of care.    History of Present Illness     Tati Higuera is a 65 y.o. female who presents for a follow up office visit in regards to Shoulder Pain " "(left).     Review of Systems   Constitutional:  Negative for fever.   HENT:  Negative for sinus pain.    Eyes:  Negative for redness.   Respiratory:  Negative for shortness of breath.    Cardiovascular:  Negative for palpitations.   Gastrointestinal:  Positive for nausea. Negative for abdominal pain.   Endocrine: Negative for polydipsia.   Genitourinary:  Negative for difficulty urinating.   Musculoskeletal:  Negative for gait problem and myalgias.        Decreased ROM   Skin:  Negative for rash.   Neurological:  Positive for dizziness. Negative for seizures and headaches.   Psychiatric/Behavioral:  Negative for decreased concentration. The patient is not nervous/anxious.        Medical History Reviewed by provider this encounter:  Tobacco  Allergies  Meds  Problems  Med Hx  Surg Hx  Fam Hx     .  Medications Ordered Prior to Encounter[1]   Social History[2]     Objective   Ht 5' 1\" (1.549 m)   Wt 53.5 kg (118 lb)   BMI 22.30 kg/m²      Pain Score:   5  Physical Exam      Ht 5' 1\" (1.549 m)   Wt 53.5 kg (118 lb)   BMI 22.30 kg/m²     Constitutional: normal, well developed, well nourished, alert, in no distress and non-toxic and no overt pain behavior.  Eyes: anicteric  HEENT: grossly intact  Neck: supple, symmetric, trachea midline and no masses   Pulmonary:even and unlabored  Cardiovascular:No edema or pitting edema present  Skin:Normal without rashes or lesions and well hydrated  Psychiatric:Mood and affect appropriate  Neurologic:Cranial Nerves II-XII grossly intact  Musculoskeletal:normal gait      No new relevant imaging available for review.       [1]   Current Outpatient Medications on File Prior to Visit   Medication Sig Dispense Refill    azelastine (ASTELIN) 0.1 % nasal spray 1 spray into each nostril 2 (two) times a day Use in each nostril as directed 30 mL 5    cyclobenzaprine (FLEXERIL) 10 mg tablet Take 1 tablet (10 mg total) by mouth 3 times a day as needed for muscle spasms 270 tablet 3 "    desvenlafaxine succinate (PRISTIQ) 50 mg 24 hr tablet Take 1 tablet (50 mg total) by mouth daily 90 tablet 0    dexlansoprazole (DEXILANT) 60 MG capsule Take 1 capsule (60 mg total) by mouth daily 90 capsule 1    divalproex sodium (Depakote) 500 mg DR tablet 2 tablets PO @  tablet 4    fluticasone (FLONASE) 50 mcg/act nasal spray 1 spray into each nostril daily 15.8 mL 11    hydrOXYzine HCL (ATARAX) 25 mg tablet TAKE (1) TABLET EVERY SIX HOURS AS NEEDED FORITCHING... 30 tablet 2    ibuprofen (MOTRIN) 600 mg tablet Take 1 tablet (600 mg total) by mouth every 6 (six) hours as needed for moderate pain or mild pain 30 tablet 0    ondansetron (ZOFRAN-ODT) 4 mg disintegrating tablet Take 1 tablet (4 mg total) by mouth every 6 (six) hours as needed for nausea or vomiting 20 tablet 3    Semaglutide-Weight Management (Wegovy) 2.4 MG/0.75ML Inject 2.4 mg under the skin weekly 9 mL 1    triamcinolone (KENALOG) 0.1 % cream Apply topically 2 (two) times a day 30 g 1    traMADol (Ultram) 50 mg tablet Take 1 tablet (50 mg total) by mouth every 6 (six) hours as needed for moderate pain (Patient not taking: Reported on 5/1/2025) 30 tablet 2     No current facility-administered medications on file prior to visit.   [2]   Social History  Tobacco Use    Smoking status: Every Day     Current packs/day: 0.25     Average packs/day: 0.3 packs/day for 45.2 years (11.3 ttl pk-yrs)     Types: Cigarettes     Passive exposure: Current    Smokeless tobacco: Never   Vaping Use    Vaping status: Every Day    Substances: THC   Substance and Sexual Activity    Alcohol use: Yes     Alcohol/week: 5.0 standard drinks of alcohol     Types: 5 Glasses of wine per week     Comment: socially    Drug use: Yes     Frequency: 7.0 times per week     Types: Marijuana     Comment: Medical Marijuana    Sexual activity: Yes     Partners: Male     Birth control/protection: Female Sterilization

## 2025-05-23 NOTE — ASSESSMENT & PLAN NOTE
65-year-old female here for a follow-up visit with regards to chronic neck pain symptoms with radiation into left upper extremity.  She does report significant improvement in pain symptoms since exacerbation after recent fall.  Since last visit, she was evaluated by orthopedic surgery, Dr. Hernandez, and was cleared from cervical collar.  She was advised to start physical therapy which patient has not yet scheduled.  She notes pain limited difficulty with function but denies new or progressive weakness.    Discussed option of obtaining cervical spine MRI for further evaluation of ongoing neck and LUE pain symptoms and for consideration of interventional treatment.  Patient defers at this time, notes preference for conservative treatment.    Agree with course of physical therapy for neck strengthening exercises, optimizing pain-free ROM.    Follow-up in 4 to 6 weeks or as needed.

## 2025-05-28 ENCOUNTER — APPOINTMENT (OUTPATIENT)
Dept: RADIOLOGY | Facility: CLINIC | Age: 65
End: 2025-05-28
Payer: COMMERCIAL

## 2025-05-28 ENCOUNTER — OFFICE VISIT (OUTPATIENT)
Dept: FAMILY MEDICINE CLINIC | Facility: CLINIC | Age: 65
End: 2025-05-28
Payer: COMMERCIAL

## 2025-05-28 VITALS
TEMPERATURE: 98.5 F | HEIGHT: 61 IN | DIASTOLIC BLOOD PRESSURE: 80 MMHG | SYSTOLIC BLOOD PRESSURE: 128 MMHG | WEIGHT: 118 LBS | BODY MASS INDEX: 22.28 KG/M2

## 2025-05-28 DIAGNOSIS — E66.09 CLASS 2 OBESITY DUE TO EXCESS CALORIES WITHOUT SERIOUS COMORBIDITY WITH BODY MASS INDEX (BMI) OF 35.0 TO 35.9 IN ADULT: ICD-10-CM

## 2025-05-28 DIAGNOSIS — E66.812 CLASS 2 OBESITY DUE TO EXCESS CALORIES WITHOUT SERIOUS COMORBIDITY WITH BODY MASS INDEX (BMI) OF 35.0 TO 35.9 IN ADULT: ICD-10-CM

## 2025-05-28 DIAGNOSIS — M47.12 CERVICAL SPONDYLOSIS WITH MYELOPATHY: ICD-10-CM

## 2025-05-28 DIAGNOSIS — K21.9 GASTROESOPHAGEAL REFLUX DISEASE WITHOUT ESOPHAGITIS: ICD-10-CM

## 2025-05-28 DIAGNOSIS — J20.9 ACUTE BRONCHITIS, UNSPECIFIED ORGANISM: ICD-10-CM

## 2025-05-28 DIAGNOSIS — J20.9 ACUTE BRONCHITIS, UNSPECIFIED ORGANISM: Primary | ICD-10-CM

## 2025-05-28 PROCEDURE — 99213 OFFICE O/P EST LOW 20 MIN: CPT | Performed by: FAMILY MEDICINE

## 2025-05-28 PROCEDURE — 96372 THER/PROPH/DIAG INJ SC/IM: CPT | Performed by: FAMILY MEDICINE

## 2025-05-28 PROCEDURE — 71046 X-RAY EXAM CHEST 2 VIEWS: CPT

## 2025-05-28 RX ORDER — AZITHROMYCIN 250 MG/1
TABLET, FILM COATED ORAL
Qty: 6 TABLET | Refills: 0 | Status: SHIPPED | OUTPATIENT
Start: 2025-05-28 | End: 2025-06-02

## 2025-05-28 RX ORDER — METHYLPREDNISOLONE 4 MG/1
TABLET ORAL
Qty: 21 EACH | Refills: 0 | Status: SHIPPED | OUTPATIENT
Start: 2025-05-28

## 2025-05-28 RX ORDER — DEXAMETHASONE SODIUM PHOSPHATE 10 MG/ML
10 INJECTION, SOLUTION INTRA-ARTICULAR; INTRALESIONAL; INTRAMUSCULAR; INTRAVENOUS; SOFT TISSUE ONCE
Status: COMPLETED | OUTPATIENT
Start: 2025-05-28 | End: 2025-05-28

## 2025-05-28 RX ORDER — ALBUTEROL SULFATE 90 UG/1
2 INHALANT RESPIRATORY (INHALATION) EVERY 6 HOURS PRN
Qty: 6.7 G | Refills: 5 | Status: SHIPPED | OUTPATIENT
Start: 2025-05-28

## 2025-05-28 RX ORDER — SEMAGLUTIDE 1.7 MG/.75ML
INJECTION, SOLUTION SUBCUTANEOUS
Qty: 9 ML | Refills: 1 | Status: SHIPPED | OUTPATIENT
Start: 2025-05-28 | End: 2025-11-28

## 2025-05-28 RX ADMIN — DEXAMETHASONE SODIUM PHOSPHATE 10 MG: 10 INJECTION, SOLUTION INTRA-ARTICULAR; INTRALESIONAL; INTRAMUSCULAR; INTRAVENOUS; SOFT TISSUE at 12:17

## 2025-05-28 NOTE — PROGRESS NOTES
Name: Tati Higuera      : 1960      MRN: [id number]  Encounter Provider: Anabella Wade DO  Encounter Date: 2025   Encounter department: [facility] PRIMARY CARE  :  Assessment & Plan  Acute bronchitis, unspecified organism    Orders:  •  XR chest pa and lateral; Future  •  dexamethasone (DECADRON) injection 10 mg  •  azithromycin (Zithromax) 250 mg tablet; Take 2 tablets (500 mg total) by mouth daily for 1 day, THEN 1 tablet (250 mg total) daily for 4 days.  •  albuterol (Proventil HFA) 90 mcg/act inhaler; Inhale 2 puffs every 6 (six) hours as needed for wheezing  •  methylPREDNISolone 4 MG tablet therapy pack; Use as directed on package    Class 2 obesity due to excess calories without serious comorbidity with body mass index (BMI) of 35.0 to 35.9 in adult    Decreasing Wegovy to 1.7 mg, advised to administer injection q. 10 to 14 days.  Monitor for a month and recheck.  Orders:  •  Semaglutide-Weight Management (Wegovy) 1.7 MG/0.75ML; Inject 1.7 mg under the skin weekly    Gastroesophageal reflux disease without esophagitis  Hopefully decrease in GLP-1 agonist dosage will help with this.       Cervical spondylosis with myelopathy  Will be attending PT in the future.  Ortho and pain management following.              History of Present Illness   Patient presents with several issues today:      GI complaints:    Patient has been on high dose Wegovy for roughly 8 to 9 months.  She has successfully lost over 80 pounds.  She has exceeded her weight loss goals.  But now she feels like she has lost too much weight, has complaints of typical GI side effects related to GLP-1 agonist.  She has extended her injections to roughly every 2 weeks instead of every week.  However, she experiences more pronounced side effects immediately after administering 2.4 mg of Wegovy, symptoms last for about 10 days.  Complains of nausea, vomiting, decrease in appetite.  Really unable to eat a whole  "lot anymore.  Bowel habits are reportedly normal.  Feels orthostatic in the morning when she is getting out of bed.  Reports not drinking adequate water as it also causes GERD/nausea.     Cough:     Present for just about a week.  She thinks she may have actually had COVID infection.  States cough is productive with an abundance of clear mucus.  Now short of breath.  Wheezing.  Feeling weak, tired, nauseated.    Neck injury/cervical spondylosis with radicular symptoms:     Status post fall mid April.  She had some time off of work to recuperate, followed up with Ortho as well as pain management.  She was cleared to return to work.  Today, she reports no pain.  She does however complain of numbness in her left thumb and a decreased range of motion in her left shoulder.  She will be attending physical therapy to improve her ROM left arm.  Pain management offered some C-spine injections in the future should pain return.      Review of Systems   Constitutional:  Positive for appetite change and fatigue. Negative for activity change.   HENT:  Positive for rhinorrhea.    Respiratory:  Positive for cough and shortness of breath.    Cardiovascular:  Negative for chest pain, palpitations and leg swelling.   Gastrointestinal:  Positive for nausea and vomiting. Negative for abdominal pain, constipation and diarrhea.       Objective   /80 (BP Location: Left arm, Patient Position: Sitting, Cuff Size: Standard)   Temp 98.5 °F (36.9 °C) (Tympanic)   Ht 5' 1\" (1.549 m)   Wt 53.5 kg (118 lb)   BMI 22.30 kg/m²      Physical Exam  Vitals and nursing note reviewed.   Constitutional:       General: She is not in acute distress.     Appearance: Normal appearance. She is not ill-appearing.   HENT:      Head: Normocephalic.     Cardiovascular:      Rate and Rhythm: Normal rate and regular rhythm.      Heart sounds: Normal heart sounds. No murmur heard.  Pulmonary:      Effort: Pulmonary effort is normal. No respiratory distress. "      Breath sounds: No stridor. Wheezing and rhonchi present.     Musculoskeletal:      Cervical back: Normal range of motion and neck supple. No rigidity or tenderness.   Lymphadenopathy:      Cervical: No cervical adenopathy.     Skin:     Capillary Refill: Capillary refill takes less than 2 seconds.     Neurological:      General: No focal deficit present.      Mental Status: She is alert and oriented to person, place, and time. Mental status is at baseline.      Cranial Nerves: No cranial nerve deficit.     Psychiatric:         Mood and Affect: Mood normal.         Thought Content: Thought content normal.         Judgment: Judgment normal.          none

## 2025-06-04 DIAGNOSIS — M47.12 CERVICAL SPONDYLOSIS WITH MYELOPATHY: Primary | ICD-10-CM

## 2025-06-04 DIAGNOSIS — M54.2 NECK PAIN: ICD-10-CM

## 2025-06-04 DIAGNOSIS — Z98.1 HX OF FUSION OF CERVICAL SPINE: ICD-10-CM

## 2025-06-05 ENCOUNTER — OFFICE VISIT (OUTPATIENT)
Dept: FAMILY MEDICINE CLINIC | Facility: CLINIC | Age: 65
End: 2025-06-05
Payer: COMMERCIAL

## 2025-06-05 VITALS
HEIGHT: 61 IN | TEMPERATURE: 95.1 F | SYSTOLIC BLOOD PRESSURE: 110 MMHG | WEIGHT: 118 LBS | DIASTOLIC BLOOD PRESSURE: 70 MMHG | BODY MASS INDEX: 22.28 KG/M2

## 2025-06-05 DIAGNOSIS — L98.9 SKIN DISORDER: Primary | ICD-10-CM

## 2025-06-05 PROCEDURE — 99212 OFFICE O/P EST SF 10 MIN: CPT | Performed by: NURSE PRACTITIONER

## 2025-06-05 NOTE — PROGRESS NOTES
"Name: Tati Higuera      : 1960      MRN: [id number]  Encounter Provider: HEIDE Smith  Encounter Date: 2025   Encounter department: [facility] PRIMARY CARE  :  Assessment & Plan  Skin disorder  Suspect she might have popped a blood vessel underneath just take a Tylenol or Advil and she can apply warm compress if she wants but it should be self-limiting follow-up if not              History of Present Illness   Patient is here because she noticed this morning that she had a sore \"welt \"on her right anterior thigh.  It seems to have absorbed but now she has soreness in the anterior medial aspect of her right thigh.  Not on a blood thinner no symptoms of infection feels fine    Rash      Review of Systems   Skin:  Positive for rash.       Objective   /70 (BP Location: Right arm, Patient Position: Sitting, Cuff Size: Standard)   Temp (!) 95.1 °F (35.1 °C) (Tympanic)   Ht 5' 1\" (1.549 m)   Wt 53.5 kg (118 lb)   BMI 22.30 kg/m²      Physical Exam    Skin:     Comments: I really do not see any significant swelling or lesion on the thigh.  There is 1 area that could be a little papule but is very nondescript.  More like just a slight swelling under her skin that is tender.  Very mild tenderness in the surrounding area as well.  There is no hyperemia.         "

## 2025-06-11 DIAGNOSIS — F31.81 MODERATE BIPOLAR II DISORDER, MAJOR DEPRESSIVE EPISODE, IN FULL REMISSION, WITH MIXED FEATURES (HCC): ICD-10-CM

## 2025-06-12 RX ORDER — DESVENLAFAXINE 50 MG/1
50 TABLET, FILM COATED, EXTENDED RELEASE ORAL DAILY
Qty: 90 TABLET | Refills: 1 | Status: SHIPPED | OUTPATIENT
Start: 2025-06-12

## 2025-06-16 ENCOUNTER — TELEPHONE (OUTPATIENT)
Dept: FAMILY MEDICINE CLINIC | Facility: CLINIC | Age: 65
End: 2025-06-16

## 2025-06-16 NOTE — TELEPHONE ENCOUNTER
PA for WEGOVY 1.7MG SUBMITTED            via    [x]SS    [x]PA sent as URGENT    All office notes, labs and other pertaining documents and studies sent. Clinical questions answered. Awaiting determination from insurance company.     Turnaround time for your insurance to make a decision on your Prior Authorization can take 7-21 business days.

## 2025-06-18 NOTE — TELEPHONE ENCOUNTER
PA for wegovy 1.7mg  APPROVED     Date(s) approved         Patient advised by          [x]MyChart Message  []Phone call   []LMOM  []L/M to call office as no active Communication consent on file  []Unable to leave detailed message as VM not approved on Communication consent       Pharmacy advised by    [x]Fax  []Phone call  []Secure Chat      Approval letter scanned into Media Yes

## 2025-06-26 ENCOUNTER — HOSPITAL ENCOUNTER (OUTPATIENT)
Dept: MRI IMAGING | Facility: HOSPITAL | Age: 65
End: 2025-06-26
Attending: FAMILY MEDICINE
Payer: COMMERCIAL

## 2025-06-26 ENCOUNTER — APPOINTMENT (OUTPATIENT)
Age: 65
End: 2025-06-26
Attending: FAMILY MEDICINE
Payer: COMMERCIAL

## 2025-06-26 DIAGNOSIS — M54.2 NECK PAIN: ICD-10-CM

## 2025-06-26 DIAGNOSIS — R73.09 ABNORMAL GLUCOSE: ICD-10-CM

## 2025-06-26 DIAGNOSIS — Z98.1 HX OF FUSION OF CERVICAL SPINE: ICD-10-CM

## 2025-06-26 DIAGNOSIS — M47.12 CERVICAL SPONDYLOSIS WITH MYELOPATHY: ICD-10-CM

## 2025-06-26 LAB
EST. AVERAGE GLUCOSE BLD GHB EST-MCNC: 103 MG/DL
HBA1C MFR BLD: 5.2 %

## 2025-06-26 PROCEDURE — 83036 HEMOGLOBIN GLYCOSYLATED A1C: CPT

## 2025-06-26 PROCEDURE — A9585 GADOBUTROL INJECTION: HCPCS | Performed by: FAMILY MEDICINE

## 2025-06-26 PROCEDURE — 72156 MRI NECK SPINE W/O & W/DYE: CPT

## 2025-06-26 PROCEDURE — 36415 COLL VENOUS BLD VENIPUNCTURE: CPT

## 2025-06-26 RX ORDER — GADOBUTROL 604.72 MG/ML
5 INJECTION INTRAVENOUS
Status: COMPLETED | OUTPATIENT
Start: 2025-06-26 | End: 2025-06-26

## 2025-06-26 RX ADMIN — GADOBUTROL 5 ML: 604.72 INJECTION INTRAVENOUS at 18:17

## 2025-06-27 ENCOUNTER — OFFICE VISIT (OUTPATIENT)
Dept: FAMILY MEDICINE CLINIC | Facility: CLINIC | Age: 65
End: 2025-06-27
Payer: COMMERCIAL

## 2025-06-27 VITALS
HEART RATE: 76 BPM | DIASTOLIC BLOOD PRESSURE: 82 MMHG | WEIGHT: 115 LBS | TEMPERATURE: 97.5 F | OXYGEN SATURATION: 98 % | SYSTOLIC BLOOD PRESSURE: 122 MMHG | BODY MASS INDEX: 21.73 KG/M2

## 2025-06-27 DIAGNOSIS — M47.12 CERVICAL SPONDYLOSIS WITH MYELOPATHY: ICD-10-CM

## 2025-06-27 DIAGNOSIS — R63.0 LACK OF APPETITE: ICD-10-CM

## 2025-06-27 DIAGNOSIS — N95.2 VAGINAL ATROPHY: Primary | ICD-10-CM

## 2025-06-27 DIAGNOSIS — E66.812 CLASS 2 OBESITY DUE TO EXCESS CALORIES WITHOUT SERIOUS COMORBIDITY WITH BODY MASS INDEX (BMI) OF 35.0 TO 35.9 IN ADULT: ICD-10-CM

## 2025-06-27 DIAGNOSIS — E66.09 CLASS 2 OBESITY DUE TO EXCESS CALORIES WITHOUT SERIOUS COMORBIDITY WITH BODY MASS INDEX (BMI) OF 35.0 TO 35.9 IN ADULT: ICD-10-CM

## 2025-06-27 DIAGNOSIS — R73.09 ABNORMAL GLUCOSE: ICD-10-CM

## 2025-06-27 PROCEDURE — 99213 OFFICE O/P EST LOW 20 MIN: CPT | Performed by: FAMILY MEDICINE

## 2025-06-27 RX ORDER — ESTRADIOL 0.1 MG/G
1 CREAM VAGINAL DAILY
Qty: 42.5 G | Refills: 5 | Status: SHIPPED | OUTPATIENT
Start: 2025-06-27

## 2025-06-27 RX ORDER — SEMAGLUTIDE 1 MG/.5ML
INJECTION, SOLUTION SUBCUTANEOUS
Qty: 2 ML | Refills: 0 | Status: SHIPPED | OUTPATIENT
Start: 2025-06-27 | End: 2025-09-27

## 2025-06-27 NOTE — PROGRESS NOTES
Name: Tati Higuera      : 1960      MRN: [id number]  Encounter Provider: Anabella Wade DO  Encounter Date: 2025   Encounter department: [facility] PRIMARY CARE  :  Assessment & Plan  Vaginal atrophy    Orders:  •  estradiol (ESTRACE VAGINAL) 0.1 mg/g vaginal cream; Insert 1 g into the vagina daily    Class 2 obesity due to excess calories without serious comorbidity with body mass index (BMI) of 35.0 to 35.9 in adult      Orders:  •  Semaglutide-Weight Management (Wegovy) 1 MG/0.5ML; Inject 1 mg under the skin weekly    Cervical spondylosis with myelopathy  MRI C spine pending read.        Lack of appetite  Lack of appetite improved. Weight has not yet stabilized. Will decreased wegovy to 1mg weekly.        Abnormal glucose  Recent A1C stable.               History of Present Illness   Recheck weight: at last OV we decreased dose of Wegovy 2/2 pronounced side effects which included nausea, lack of appetite, continued weight loss, weakness. Feeling a bit better. Still a little constipated but will use laxative prn. Has continues to lose weight but states her appetite is much improved. 125-130# is goal wt.     Had MRI neck yesterday. Planning on following up with PM.     C/o dyspareunia, vaginal dryness. Prev on E2 cream after hyster. Ran out and never refilled.       Review of Systems   Constitutional:  Positive for appetite change and unexpected weight change. Negative for fatigue.   Respiratory:  Negative for shortness of breath and wheezing.    Cardiovascular:  Negative for chest pain, palpitations and leg swelling.   Gastrointestinal:  Positive for constipation.   Musculoskeletal:  Positive for neck pain and neck stiffness.       Objective   /82 (BP Location: Left arm, Patient Position: Sitting, Cuff Size: Standard)   Pulse 76   Temp 97.5 °F (36.4 °C) (Tympanic)   Wt 52.2 kg (115 lb)   SpO2 98%   BMI 21.73 kg/m²      Physical Exam  Vitals and nursing note  reviewed.   Constitutional:       General: She is not in acute distress.     Appearance: Normal appearance. She is normal weight. She is not ill-appearing or toxic-appearing.   HENT:      Head: Normocephalic.     Musculoskeletal:         General: Normal range of motion.     Skin:     General: Skin is warm.     Neurological:      General: No focal deficit present.      Mental Status: She is alert and oriented to person, place, and time. Mental status is at baseline.      Cranial Nerves: No cranial nerve deficit.     Psychiatric:         Attention and Perception: Attention and perception normal.         Mood and Affect: Mood normal.         Speech: Speech normal.         Behavior: Behavior normal. Behavior is cooperative.         Thought Content: Thought content normal.         Cognition and Memory: Cognition and memory normal.         Judgment: Judgment normal.

## 2025-06-27 NOTE — ASSESSMENT & PLAN NOTE
Orders:  •  Semaglutide-Weight Management (Wegovy) 1 MG/0.5ML; Inject 1 mg under the skin weekly

## 2025-06-27 NOTE — ASSESSMENT & PLAN NOTE
Lack of appetite improved. Weight has not yet stabilized. Will decreased wegovy to 1mg weekly.

## 2025-07-07 ENCOUNTER — TELEPHONE (OUTPATIENT)
Dept: CARDIOLOGY CLINIC | Facility: CLINIC | Age: 65
End: 2025-07-07

## 2025-07-07 NOTE — TELEPHONE ENCOUNTER
Left a message on patients phone to see if she would like to come in today at 1:00 pm with Dr Tompkins if the spot is still available when she calls back.  She only needs to be 20 mins with Dr Tompkins

## 2025-07-09 ENCOUNTER — TELEPHONE (OUTPATIENT)
Dept: OBGYN CLINIC | Facility: MEDICAL CENTER | Age: 65
End: 2025-07-09

## 2025-07-09 ENCOUNTER — TELEPHONE (OUTPATIENT)
Age: 65
End: 2025-07-09

## 2025-07-09 NOTE — TELEPHONE ENCOUNTER
Pt is new to office. Pt is have postmenopausal bleeding. Bleeding started Friday. Pass hysterectomy in 2000. Pt can only do first thin in the morning and at the [place] location. No answer from office, pt aware of message sent.

## 2025-07-09 NOTE — TELEPHONE ENCOUNTER
Called patient back to schedule an appt, patient only wanted [place] [facility] in the AM, unfortunately there was nothing until Sept for what she was looking for,  I did let her know we do have other offices and I could find something in one of them, patient was being difficult, she said she will have to find another office closer to her and hung up the phone.

## 2025-07-10 NOTE — TELEPHONE ENCOUNTER
Patient called in to schedule an appt as yesterday was unable to. Patient was offererd July 23rd in [place] as there was an opening. Patient said it is a bad week for her. Patient given next appt in [place] being September. Patient placed on waitlist and advised I would send message if patient needed to be seen sooner for the abnormal bleeding.     Patient also wanting to apologize for being nasty yesterday was just upset for the time period to wait and did not want to go to [place] location.     Please advise  Thank You

## 2025-07-21 ENCOUNTER — OFFICE VISIT (OUTPATIENT)
Dept: CARDIOLOGY CLINIC | Facility: CLINIC | Age: 65
End: 2025-07-21
Payer: COMMERCIAL

## 2025-07-21 VITALS
SYSTOLIC BLOOD PRESSURE: 100 MMHG | DIASTOLIC BLOOD PRESSURE: 76 MMHG | HEART RATE: 62 BPM | BODY MASS INDEX: 21.71 KG/M2 | HEIGHT: 61 IN | WEIGHT: 115 LBS

## 2025-07-21 DIAGNOSIS — R94.31 NONSPECIFIC ABNORMAL ELECTROCARDIOGRAM (ECG) (EKG): ICD-10-CM

## 2025-07-21 DIAGNOSIS — R55 SYNCOPE: ICD-10-CM

## 2025-07-21 DIAGNOSIS — R60.0 BILATERAL LEG EDEMA: ICD-10-CM

## 2025-07-21 DIAGNOSIS — R94.31 ABNORMAL ECG: ICD-10-CM

## 2025-07-21 DIAGNOSIS — Z87.898 HISTORY OF SYNCOPE: Primary | ICD-10-CM

## 2025-07-21 DIAGNOSIS — E87.6 HYPOKALEMIA: ICD-10-CM

## 2025-07-21 PROCEDURE — 99204 OFFICE O/P NEW MOD 45 MIN: CPT | Performed by: INTERNAL MEDICINE

## 2025-07-21 NOTE — ASSESSMENT & PLAN NOTE
Likely related to orthostasis from weight loss.  Also K was quite low.  Symptoms are improved.  Nonetheless will recheck BMP and obtain an echo.

## 2025-07-21 NOTE — ASSESSMENT & PLAN NOTE
Mild.  Possibly nutritional.  Complete metabolic profile will be checked as well as an echocardiogram.

## 2025-07-21 NOTE — PROGRESS NOTES
" Patient ID: Tati Higuera is a 65 y.o. female.        Plan:      Assessment & Plan  History of syncope  Likely related to orthostasis from weight loss.  Also K was quite low.  Symptoms are improved.  Nonetheless will recheck BMP and obtain an echo.  Abnormal ECG  Will check an echo.  Hypokalemia  No recheck since April when potassium level was 3.1.  I am going to order that.  Bilateral leg edema  Mild.  Possibly nutritional.  Complete metabolic profile will be checked as well as an echocardiogram.      Follow up Plan/Other summary comments:  Assuming echocardiogram is okay follow-up here will be as needed.  I will communicate with primary care if the lab tests are abnormal.  In the meantime I encouraged her to use salt.    HPI: Patient is seen today for reasons stated above.  Over the past year she has lost significant amount of weight on Wegovy.  In April after standing up from being on the couch she had a syncopal episode hitting her head on the way down.  Episode was attributed to this weight loss and relative dehydration.  EKG was also mildly abnormal.  Since then she has cut down on the Wegovy dose and weight loss has stabilized.  She still feels mildly lightheaded upon arising but not as much as prior.        Most recent or relevant cardiac/vascular testing:    EKG/12/2025: Sinus rhythm.  Nonspecific ST segment changes.      Past Surgical History[1]    Lipid Profile: Reviewed      Review of Systems   10  point ROS  was otherwise non pertinent or negative except as per HPI or as below.   Gait:  Normal.        Objective:     /76   Pulse 62   Ht 5' 1\" (1.549 m)   Wt 52.2 kg (115 lb)   BMI 21.73 kg/m²     PHYSICAL EXAM:    General:  Normal appearance in no distress.  Eyes:  Anicteric.  Oral mucosa:  Moist.  Neck:  No JVD. Carotid upstrokes are brisk without bruits.  No masses.  Chest:  Clear to auscultation.  Cardiac:  No palpable PMI.  Normal S1 and S2.  No murmur gallop or rub.  Abdomen:  Soft and " nontender. No palpable organomegaly or aortic enlargement.  Extremities: 1+ pretibial edema.    Musculoskeletal:  Symmetric.   Vascular:  Femoral pulses are brisk without bruits.  Popliteal pulses are intact bilaterally.   Pedal pulses are intact.  Neuro:  Grossly symmetric.  Psych:  Alert and oriented x3.      Meds reviewed.    Past Medical History[2]        Tobacco Use History[3]               [1]   Past Surgical History:  Procedure Laterality Date    BREAST BIOPSY Right     benign    CYST REMOVAL Right     hand    CYSTOSCOPY N/A 02/08/2017    Procedure: CYSTOSCOPY;  Surgeon: Sarath Suresh MD;  Location: [facility];  Service:     EXAMINATION UNDER ANESTHESIA N/A 02/08/2017    Procedure: EXAM UNDER ANESTHESIA, RESECTION OF VAGINAL GRAFT;  Surgeon: Sarath Suresh MD;  Location: [facility];  Service:     HYSTERECTOMY      MITRAL VALVE REPLACEMENT      NECK SURGERY  [id number]    CA CYSTOURETHROSCOPY N/A 09/27/2017    Procedure: CYSTOSCOPY;  Surgeon: Sarath Suresh MD;  Location: [facility];  Service: UroGynecology           CA REVJ/RMVL PROSTHETIC VAGINAL GRAFT VAGINAL JANI N/A 09/27/2017    Procedure: RESECTION OF EXPOSED MESH VAGINAL AND PARAVAGINAL TISSUE;  Surgeon: Sarath Suresh MD;  Location: [facility];  Service: UroGynecology           SPINAL FUSION  2015    C4-C5 and C5-C6    TONSILLECTOMY      TOTAL VAGINAL HYSTERECTOMY      TUBAL LIGATION     [2]   Past Medical History:  Diagnosis Date    Anxiety     Bipolar disorder (HCC)     Cervical disc disorder     Class 2 obesity due to excess calories without serious comorbidity with body mass index (BMI) of 35.0 to 35.9 in adult 08/02/2023    COVID 12/28/2023    Depression     GERD (gastroesophageal reflux disease)     Headache     after anesthesia    Headache(784.0)     History of meningitis     age 5    Hx of fusion of cervical spine 09/21/2023    Hyperlipidemia     Localized edema 12/15/2023    migraines     Nasal congestion 03/11/2024    Obesity     Primary  hypertension 10/12/2023    Wears glasses    [3]   Social History  Tobacco Use   Smoking Status Every Day    Current packs/day: 0.25    Average packs/day: 0.3 packs/day for 45.2 years (11.3 ttl pk-yrs)    Types: Cigarettes    Passive exposure: Current   Smokeless Tobacco Never

## 2025-07-23 ENCOUNTER — OFFICE VISIT (OUTPATIENT)
Dept: PAIN MEDICINE | Facility: CLINIC | Age: 65
End: 2025-07-23
Payer: COMMERCIAL

## 2025-07-23 VITALS — BODY MASS INDEX: 21.34 KG/M2 | HEIGHT: 61 IN | WEIGHT: 113 LBS

## 2025-07-23 DIAGNOSIS — M50.30 DDD (DEGENERATIVE DISC DISEASE), CERVICAL: ICD-10-CM

## 2025-07-23 DIAGNOSIS — G89.4 CHRONIC PAIN SYNDROME: ICD-10-CM

## 2025-07-23 DIAGNOSIS — M54.12 CERVICAL RADICULOPATHY: Primary | ICD-10-CM

## 2025-07-23 PROCEDURE — 99214 OFFICE O/P EST MOD 30 MIN: CPT | Performed by: STUDENT IN AN ORGANIZED HEALTH CARE EDUCATION/TRAINING PROGRAM

## 2025-07-23 NOTE — PROGRESS NOTES
"Name: Tati Higuera      : 1960      MRN: [id number]  Encounter Provider: Luz Vieira MD  Encounter Date: 2025   Encounter department: [facility] [place]  :    Portions of the record may have been created with voice recognition software. Occasional wrong word or \"sound a like\" substitutions may have occurred due to the inherent limitations of voice recognition software. Read the chart carefully and recognize, using context, where substitutions have occurred. Contact me with any questions.       Assessment & Plan  Cervical radiculopathy  Chronic pain syndrome  DDD (degenerative disc disease), cervical    65-year-old female here for follow-up visit with regards to chronic neck and LUE pain symptoms.  Since last visit, she notes persistent pain symptoms, denies new or progressive weakness, balance or gait issues.  Reports she has been unable to start physical therapy as previously discussed due to dealing with other medical issues.  Since last visit, she underwent a cervical spine MRI which shows stable ACDF C5-7, multilevel DDD with variable canal and foraminal narrowing.      Discussed option of cervical epidural steroid injection for symptom management.  Patient elected to schedule this today.    Again, recommend starting physical therapy when able.    Follow-up in 2 weeks after injection.      Orders:    [place] spine and pain procedure; Future        Complete risks and benefits including bleeding, infection, tissue reaction, nerve injury and allergic reaction were discussed. The approach was demonstrated using models and literature was provided. Verbal and written consent was obtained.    My impressions and treatment recommendations were discussed in detail with the patient who verbalized understanding and had no further questions.  Discharge instructions were provided. I personally saw and examined the patient and I agree with the above discussed plan of care.    History of " "Present Illness     Tati Higuera is a 65 y.o. female who presents for a follow up office visit in regards to Neck Pain.       Review of Systems   Constitutional:  Negative for fever.   HENT:  Negative for sinus pain.    Eyes:  Negative for redness.   Respiratory:  Negative for shortness of breath.    Cardiovascular:  Negative for palpitations.   Gastrointestinal:  Negative for abdominal pain and nausea.   Endocrine: Negative for polydipsia.   Genitourinary:  Negative for difficulty urinating.   Musculoskeletal:  Positive for myalgias, neck pain and neck stiffness. Negative for gait problem.        Decreased ROM  Pain in Extremity-left arm   Skin:  Negative for rash.   Neurological:  Positive for light-headedness and headaches. Negative for seizures.   Psychiatric/Behavioral:  Negative for decreased concentration. The patient is not nervous/anxious.        Medical History Reviewed by provider this encounter:  Tobacco  Allergies  Meds  Problems  Med Hx  Surg Hx  Fam Hx     .  Medications Ordered Prior to Encounter[1]   Social History[2]     Objective   Ht 5' 1\" (1.549 m)   Wt 51.3 kg (113 lb)   BMI 21.35 kg/m²      Pain Score:   4  Physical Exam  Constitutional: normal, well developed, well nourished, alert, in no distress and non-toxic and no overt pain behavior.  Eyes: anicteric  HEENT: grossly intact  Neck: supple, symmetric, trachea midline and no masses   Pulmonary: even and unlabored  Cardiovascular: No edema or pitting edema present  Skin: Normal without rashes or lesions and well hydrated  Psychiatric: Mood and affect appropriate  Neurologic: Cranial Nerves II-XII grossly intact  Musculoskeletal: normal gait           Study Result    Narrative & Impression   MRI CERVICAL SPINE WITH AND WITHOUT CONTRAST     INDICATION: M47.12: Other spondylosis with myelopathy, cervical region  M54.2: Cervicalgia  Z98.1: Arthrodesis status.     COMPARISON: CT cervical spine 4/12/2025. MRI cervical spine " 8/15/2023     TECHNIQUE:  Multiplanar, multisequence imaging of the cervical spine was performed before and after gadolinium administration. .        IV Contrast:  5 mL of Gadobutrol injection (SINGLE-DOSE)     IMAGE QUALITY:  Diagnostic.     FINDINGS:     POSTSURGICAL FINDINGS: Stable ACDF C5-C7     ALIGNMENT:  No compression fracture.  No subluxation.  No scoliosis.     MARROW SIGNAL:  Normal marrow signal is identified within the visualized bony structures.  No discrete marrow lesion.     CERVICAL AND VISUALIZED UPPER THORACIC CORD:  Normal signal within the visualized cord.     PREVERTEBRAL AND PARASPINAL SOFT TISSUES:  Normal.     VISUALIZED POSTERIOR FOSSA:  The visualized posterior fossa demonstrates no abnormal signal.     CERVICAL DISC SPACES:     C2-3: Stable mild disc bulge without central canal or neural foraminal narrowing.     C3-4: Diffuse disc osteophyte complex with bilateral uncovertebral hypertrophy partially effacing the ventral subarachnoid space. Stable mild to moderate central canal narrowing. Unchanged moderate right and moderate to severe neural foraminal stenosis.     C4-5: Diffuse disc osteophyte complex with bilateral uncovertebral hypertrophy partially effacing the ventral subarachnoid space. Stable mild to moderate central canal narrowing. Unchanged moderate to severe bilateral neural foraminal stenosis.     C5-6: Status post ACDF.  No central canal narrowing.  No neural foraminal stenosis.     C6-7: Status post ACDF.  No central canal narrowing.  No neural foraminal stenosis.     C7-T1: No focal disc herniation, central canal stenosis, or neural foraminal narrowing.     UPPER THORACIC DISC SPACES:  Normal.     POSTCONTRAST IMAGING: There is no pathologic cord enhancement.        IMPRESSION:     Stable ACDF C5-C7.     Multilevel cervical degenerative disc disease similar to 8/15/2023 with no new disc herniation. Persistent diffuse disc osteophyte complexes at the C3-4 and C4-5 levels  with mild to moderate central canal narrowing. Unchanged moderate to severe bilateral   C3-4 and C4-5 neural foraminal narrowing.     The cervical cord appears normal in caliber and signal with no evidence of focal impingement or pathologic enhancement.            [1]   Current Outpatient Medications on File Prior to Visit   Medication Sig Dispense Refill    albuterol (Proventil HFA) 90 mcg/act inhaler Inhale 2 puffs every 6 (six) hours as needed for wheezing 6.7 g 5    azelastine (ASTELIN) 0.1 % nasal spray 1 spray into each nostril 2 (two) times a day Use in each nostril as directed 30 mL 5    cyclobenzaprine (FLEXERIL) 10 mg tablet Take 1 tablet (10 mg total) by mouth 3 times a day as needed for muscle spasms 270 tablet 3    desvenlafaxine succinate (PRISTIQ) 50 mg 24 hr tablet Take 1 tablet (50 mg total) by mouth daily 90 tablet 1    dexlansoprazole (DEXILANT) 60 MG capsule Take 1 capsule (60 mg total) by mouth daily 90 capsule 1    divalproex sodium (Depakote) 500 mg DR tablet 2 tablets PO @  tablet 4    estradiol (ESTRACE VAGINAL) 0.1 mg/g vaginal cream Insert 1 g into the vagina daily 42.5 g 5    fluticasone (FLONASE) 50 mcg/act nasal spray 1 spray into each nostril daily 15.8 mL 11    hydrOXYzine HCL (ATARAX) 25 mg tablet TAKE (1) TABLET EVERY SIX HOURS AS NEEDED FORITCHING... 30 tablet 2    ondansetron (ZOFRAN-ODT) 4 mg disintegrating tablet Take 1 tablet (4 mg total) by mouth every 6 (six) hours as needed for nausea or vomiting 20 tablet 3    Semaglutide-Weight Management (Wegovy) 1 MG/0.5ML Inject 1 mg under the skin weekly 2 mL 0    triamcinolone (KENALOG) 0.1 % cream Apply topically 2 (two) times a day 30 g 1     No current facility-administered medications on file prior to visit.   [2]   Social History  Tobacco Use    Smoking status: Every Day     Current packs/day: 0.25     Average packs/day: 0.3 packs/day for 45.2 years (11.3 ttl pk-yrs)     Types: Cigarettes     Passive exposure: Current     Smokeless tobacco: Never   Vaping Use    Vaping status: Every Day    Substances: THC   Substance and Sexual Activity    Alcohol use: Yes     Alcohol/week: 5.0 standard drinks of alcohol     Types: 5 Glasses of wine per week     Comment: socially    Drug use: Yes     Frequency: 7.0 times per week     Types: Marijuana     Comment: Medical Marijuana    Sexual activity: Yes     Partners: Male     Birth control/protection: Female Sterilization

## 2025-07-23 NOTE — ASSESSMENT & PLAN NOTE
65-year-old female here for follow-up visit with regards to chronic neck and LUE pain symptoms.  Since last visit, she notes persistent pain symptoms, denies new or progressive weakness, balance or gait issues.  Reports she has been unable to start physical therapy as previously discussed due to dealing with other medical issues.  Since last visit, she underwent a cervical spine MRI which shows stable ACDF C5-7, multilevel DDD with variable canal and foraminal narrowing.      Discussed option of cervical epidural steroid injection for symptom management.  Patient elected to schedule this today.    Again, recommend starting physical therapy when able.    Follow-up in 2 weeks after injection.      Orders:    [place] spine and pain procedure; Future

## 2025-07-31 ENCOUNTER — APPOINTMENT (OUTPATIENT)
Age: 65
End: 2025-07-31
Payer: COMMERCIAL

## 2025-07-31 ENCOUNTER — TRANSCRIBE ORDERS (OUTPATIENT)
Age: 65
End: 2025-07-31

## 2025-07-31 DIAGNOSIS — R94.31 ABNORMAL ECG: ICD-10-CM

## 2025-07-31 DIAGNOSIS — E87.6 HYPOKALEMIA: ICD-10-CM

## 2025-07-31 DIAGNOSIS — R60.0 BILATERAL LEG EDEMA: ICD-10-CM

## 2025-07-31 DIAGNOSIS — Z00.8 HEALTH EXAMINATION IN POPULATION SURVEYS: Primary | ICD-10-CM

## 2025-07-31 DIAGNOSIS — Z00.8 HEALTH EXAMINATION IN POPULATION SURVEYS: ICD-10-CM

## 2025-07-31 LAB
ALBUMIN SERPL BCG-MCNC: 4.1 G/DL (ref 3.5–5)
ALP SERPL-CCNC: 51 U/L (ref 34–104)
ALT SERPL W P-5'-P-CCNC: 8 U/L (ref 7–52)
ANION GAP SERPL CALCULATED.3IONS-SCNC: 9 MMOL/L (ref 4–13)
AST SERPL W P-5'-P-CCNC: 20 U/L (ref 13–39)
BILIRUB SERPL-MCNC: 0.56 MG/DL (ref 0.2–1)
BUN SERPL-MCNC: 7 MG/DL (ref 5–25)
CALCIUM SERPL-MCNC: 10.3 MG/DL (ref 8.4–10.2)
CHLORIDE SERPL-SCNC: 94 MMOL/L (ref 96–108)
CHOLEST SERPL-MCNC: 257 MG/DL (ref ?–200)
CO2 SERPL-SCNC: 32 MMOL/L (ref 21–32)
CREAT SERPL-MCNC: 0.69 MG/DL (ref 0.6–1.3)
EST. AVERAGE GLUCOSE BLD GHB EST-MCNC: 94 MG/DL
GFR SERPL CREATININE-BSD FRML MDRD: 92 ML/MIN/1.73SQ M
GLUCOSE P FAST SERPL-MCNC: 81 MG/DL (ref 65–99)
HBA1C MFR BLD: 4.9 %
HDLC SERPL-MCNC: 55 MG/DL
LDLC SERPL CALC-MCNC: 169 MG/DL (ref 0–100)
NONHDLC SERPL-MCNC: 202 MG/DL
POTASSIUM SERPL-SCNC: 4.3 MMOL/L (ref 3.5–5.3)
PROT SERPL-MCNC: 7 G/DL (ref 6.4–8.4)
SODIUM SERPL-SCNC: 135 MMOL/L (ref 135–147)
TRIGL SERPL-MCNC: 166 MG/DL (ref ?–150)

## 2025-07-31 PROCEDURE — 36415 COLL VENOUS BLD VENIPUNCTURE: CPT

## 2025-07-31 PROCEDURE — 80053 COMPREHEN METABOLIC PANEL: CPT

## 2025-07-31 PROCEDURE — 80061 LIPID PANEL: CPT

## 2025-08-06 ENCOUNTER — APPOINTMENT (OUTPATIENT)
Dept: RADIOLOGY | Facility: CLINIC | Age: 65
End: 2025-08-06
Payer: COMMERCIAL

## 2025-08-06 ENCOUNTER — OFFICE VISIT (OUTPATIENT)
Dept: FAMILY MEDICINE CLINIC | Facility: CLINIC | Age: 65
End: 2025-08-06
Payer: COMMERCIAL

## 2025-08-06 ENCOUNTER — TELEPHONE (OUTPATIENT)
Age: 65
End: 2025-08-06

## 2025-08-06 VITALS
SYSTOLIC BLOOD PRESSURE: 128 MMHG | OXYGEN SATURATION: 96 % | WEIGHT: 113 LBS | HEART RATE: 73 BPM | HEIGHT: 61 IN | BODY MASS INDEX: 21.34 KG/M2 | DIASTOLIC BLOOD PRESSURE: 80 MMHG

## 2025-08-06 DIAGNOSIS — M25.531 RIGHT WRIST PAIN: Primary | ICD-10-CM

## 2025-08-06 DIAGNOSIS — M25.561 ACUTE PAIN OF RIGHT KNEE: ICD-10-CM

## 2025-08-06 DIAGNOSIS — M79.641 RIGHT HAND PAIN: ICD-10-CM

## 2025-08-06 DIAGNOSIS — Z91.81 HISTORY OF FALLING: ICD-10-CM

## 2025-08-06 PROCEDURE — 99213 OFFICE O/P EST LOW 20 MIN: CPT | Performed by: NURSE PRACTITIONER

## 2025-08-06 PROCEDURE — 96372 THER/PROPH/DIAG INJ SC/IM: CPT | Performed by: NURSE PRACTITIONER

## 2025-08-06 RX ORDER — KETOROLAC TROMETHAMINE 30 MG/ML
60 INJECTION, SOLUTION INTRAMUSCULAR; INTRAVENOUS ONCE
Status: COMPLETED | OUTPATIENT
Start: 2025-08-06 | End: 2025-08-06

## 2025-08-06 RX ADMIN — KETOROLAC TROMETHAMINE 60 MG: 30 INJECTION, SOLUTION INTRAMUSCULAR; INTRAVENOUS at 10:20

## 2025-08-11 ENCOUNTER — HOSPITAL ENCOUNTER (OUTPATIENT)
Dept: RADIOLOGY | Facility: HOSPITAL | Age: 65
Discharge: HOME | End: 2025-08-11
Attending: STUDENT IN AN ORGANIZED HEALTH CARE EDUCATION/TRAINING PROGRAM
Payer: COMMERCIAL

## 2025-08-11 PROBLEM — M54.12 CERVICAL RADICULOPATHY: Status: ACTIVE | Noted: 2025-08-11

## 2025-08-12 ENCOUNTER — OFFICE VISIT (OUTPATIENT)
Dept: FAMILY MEDICINE CLINIC | Facility: CLINIC | Age: 65
End: 2025-08-12
Payer: COMMERCIAL

## (undated) DEVICE — SUT VICRYL 2-0 SH 27 IN UNDYED J417H

## (undated) DEVICE — MEDI-VAC YANKAUER SUCTION HANDLE W/BULBOUS AND CONTROL VENT: Brand: CARDINAL HEALTH

## (undated) DEVICE — ALLENTOWN DR  LUCENTE S LAP PK: Brand: CARDINAL HEALTH

## (undated) DEVICE — 3000CC GUARDIAN II: Brand: GUARDIAN

## (undated) DEVICE — PACKING VAGINAL 2 IN

## (undated) DEVICE — SPONGE CHERRY 1/2IN

## (undated) DEVICE — ASTOUND STANDARD SURGICAL GOWN, XL: Brand: CONVERTORS

## (undated) DEVICE — CAUTERY TIP POLISHER: Brand: DEVON

## (undated) DEVICE — 10FR FRAZIER SUCTION HANDLE: Brand: CARDINAL HEALTH

## (undated) DEVICE — IV SET VIAL ADAPTOR

## (undated) DEVICE — GLOVE INDICATOR PI UNDERGLOVE SZ 7 BLUE

## (undated) DEVICE — GLOVE SRG BIOGEL 7.5

## (undated) DEVICE — TUBING SUCTION 5MM X 12 FT

## (undated) DEVICE — TELFA NON-ADHERENT ABSORBENT DRESSING: Brand: TELFA

## (undated) DEVICE — VIAL DECANTER

## (undated) DEVICE — BUTTON SWITCH PENCIL HOLSTER: Brand: VALLEYLAB

## (undated) DEVICE — GLOVE INDICATOR PI UNDERGLOVE SZ 7.5 BLUE

## (undated) DEVICE — BULB SYRINGE,IRRIGATION WITH PROTECTIVE CAP: Brand: DOVER

## (undated) DEVICE — 2000CC GUARDIAN II: Brand: GUARDIAN

## (undated) DEVICE — CATH FOLEY 18FR 5ML 2 WAY SILICONE ELASTIMER

## (undated) DEVICE — BAG URINE DRAINAGE 2000ML ANTI RFLX LF

## (undated) DEVICE — GLOVE SRG BIOGEL 7

## (undated) DEVICE — SCD SEQUENTIAL COMPRESSION COMFORT SLEEVE MEDIUM KNEE LENGTH: Brand: KENDALL SCD

## (undated) DEVICE — SUT VICRYL 2-0 CT-2 27 IN J269H

## (undated) DEVICE — 3M™ STERI-DRAPE™ UNDER BUTTOCKS DRAPE WITH POUCH 1084: Brand: STERI-DRAPE™